# Patient Record
Sex: FEMALE | Race: OTHER | HISPANIC OR LATINO | Employment: FULL TIME | URBAN - METROPOLITAN AREA
[De-identification: names, ages, dates, MRNs, and addresses within clinical notes are randomized per-mention and may not be internally consistent; named-entity substitution may affect disease eponyms.]

---

## 2023-06-25 ENCOUNTER — OFFICE VISIT (OUTPATIENT)
Dept: URGENT CARE | Facility: PHYSICIAN GROUP | Age: 28
End: 2023-06-25
Payer: COMMERCIAL

## 2023-06-25 ENCOUNTER — HOSPITAL ENCOUNTER (OUTPATIENT)
Facility: MEDICAL CENTER | Age: 28
End: 2023-06-25
Attending: PHYSICIAN ASSISTANT
Payer: COMMERCIAL

## 2023-06-25 VITALS
SYSTOLIC BLOOD PRESSURE: 138 MMHG | WEIGHT: 293 LBS | TEMPERATURE: 97.3 F | OXYGEN SATURATION: 99 % | HEART RATE: 109 BPM | DIASTOLIC BLOOD PRESSURE: 64 MMHG | HEIGHT: 62 IN | BODY MASS INDEX: 53.92 KG/M2 | RESPIRATION RATE: 20 BRPM

## 2023-06-25 DIAGNOSIS — Z32.01 PREGNANCY CONFIRMED BY POSITIVE URINE TEST: ICD-10-CM

## 2023-06-25 LAB
APPEARANCE UR: CLEAR
BILIRUB UR STRIP-MCNC: NEGATIVE MG/DL
CANDIDA DNA VAG QL PROBE+SIG AMP: POSITIVE
COLOR UR AUTO: YELLOW
G VAGINALIS DNA VAG QL PROBE+SIG AMP: POSITIVE
GLUCOSE UR STRIP.AUTO-MCNC: NEGATIVE MG/DL
KETONES UR STRIP.AUTO-MCNC: NEGATIVE MG/DL
LEUKOCYTE ESTERASE UR QL STRIP.AUTO: NORMAL
NITRITE UR QL STRIP.AUTO: NEGATIVE
PH UR STRIP.AUTO: 7 [PH] (ref 5–8)
POCT INT CON NEG: NEGATIVE
POCT INT CON POS: POSITIVE
POCT URINE PREGNANCY TEST: POSITIVE
PROT UR QL STRIP: NEGATIVE MG/DL
RBC UR QL AUTO: NORMAL
SP GR UR STRIP.AUTO: 1.01
T VAGINALIS DNA VAG QL PROBE+SIG AMP: NEGATIVE
UROBILINOGEN UR STRIP-MCNC: 0.2 MG/DL

## 2023-06-25 PROCEDURE — 3075F SYST BP GE 130 - 139MM HG: CPT | Performed by: PHYSICIAN ASSISTANT

## 2023-06-25 PROCEDURE — 87491 CHLMYD TRACH DNA AMP PROBE: CPT

## 2023-06-25 PROCEDURE — 81002 URINALYSIS NONAUTO W/O SCOPE: CPT | Performed by: PHYSICIAN ASSISTANT

## 2023-06-25 PROCEDURE — 99203 OFFICE O/P NEW LOW 30 MIN: CPT | Performed by: PHYSICIAN ASSISTANT

## 2023-06-25 PROCEDURE — 87510 GARDNER VAG DNA DIR PROBE: CPT

## 2023-06-25 PROCEDURE — 3078F DIAST BP <80 MM HG: CPT | Performed by: PHYSICIAN ASSISTANT

## 2023-06-25 PROCEDURE — 87086 URINE CULTURE/COLONY COUNT: CPT

## 2023-06-25 PROCEDURE — 87480 CANDIDA DNA DIR PROBE: CPT

## 2023-06-25 PROCEDURE — 87591 N.GONORRHOEAE DNA AMP PROB: CPT

## 2023-06-25 PROCEDURE — 87660 TRICHOMONAS VAGIN DIR PROBE: CPT

## 2023-06-25 PROCEDURE — 81025 URINE PREGNANCY TEST: CPT | Performed by: PHYSICIAN ASSISTANT

## 2023-06-25 ASSESSMENT — ENCOUNTER SYMPTOMS
NAUSEA: 0
EYE PAIN: 0
DIARRHEA: 0
FEVER: 0
CONSTIPATION: 0
VOMITING: 0
SHORTNESS OF BREATH: 0
MYALGIAS: 0
COUGH: 0
CHILLS: 0
HEADACHES: 0
ABDOMINAL PAIN: 0
SORE THROAT: 0

## 2023-06-25 NOTE — PROGRESS NOTES
"Subjective:   Shelby Calderon is a 28 y.o. female who presents for UTI (Painful urination,possible vaginal infection,x1 day/Positive pregnancy test yesterday)      FIRST DAY OF LAST MENSTRUAL PERIOD around 3 months ago, has had some spotting last 2 months but not normal cycles.  Took a pregnancy test yesterday and was positive.  She is mostly noted some suprapubic pressure as well as pulling of the ligaments on bilateral hips when standing or coughing.  She has not noted any dysuria, frequency or urgency.  She denies any vaginal discharge.  She has not had any recent spotting or bleeding.        Review of Systems   Constitutional:  Negative for chills and fever.   HENT:  Negative for congestion, ear pain and sore throat.    Eyes:  Negative for pain.   Respiratory:  Negative for cough and shortness of breath.    Cardiovascular:  Negative for chest pain.   Gastrointestinal:  Negative for abdominal pain, constipation, diarrhea, nausea and vomiting.   Genitourinary:  Negative for dysuria.   Musculoskeletal:  Negative for myalgias.   Skin:  Negative for rash.   Neurological:  Negative for headaches.       Medications, Allergies, and current problem list reviewed today in Epic.     Objective:     /64 (BP Location: Right arm, Patient Position: Sitting, BP Cuff Size: Large adult)   Pulse (!) 109   Temp 36.3 °C (97.3 °F) (Temporal)   Resp 20   Ht 1.575 m (5' 2\")   Wt (!) 143 kg (316 lb)   SpO2 99%     Physical Exam  Vitals reviewed.   Constitutional:       Appearance: Normal appearance. She is obese.   HENT:      Head: Normocephalic and atraumatic.      Right Ear: External ear normal.      Left Ear: External ear normal.      Nose: Nose normal.      Mouth/Throat:      Mouth: Mucous membranes are moist.   Eyes:      Conjunctiva/sclera: Conjunctivae normal.   Cardiovascular:      Rate and Rhythm: Normal rate.   Pulmonary:      Effort: Pulmonary effort is normal.   Abdominal:      Tenderness: There is no abdominal " tenderness. There is no right CVA tenderness or left CVA tenderness.   Skin:     General: Skin is warm and dry.      Capillary Refill: Capillary refill takes less than 2 seconds.   Neurological:      Mental Status: She is alert and oriented to person, place, and time.         Assessment/Plan:     Diagnosis and associated orders:     1. Pregnancy confirmed by positive urine test  Chlamydia/GC, PCR (Genital/Anal swab)    POCT PREGNANCY    POCT Urinalysis    URINE CULTURE(NEW)    VAGINAL PATHOGENS DNA PANEL    Referral to OB/Gyn         Comments/MDM:     Urinalysis equivocal, pregnancy test in clinic positive.  Given her vague symptoms this could be a normal pregnancy symptoms versus evidence of a pathology.  Recommend we hold off on treatment until test results are available and clearly identify if there is any problem.  We will place a referral to OB/GYN, encouraged her to follow-up as if her LMP was around 3 months ago she likely requires some prenatal care.  Encouraged abstinence from controlled substances.  Reviewed pregnancy safe medications         Differential diagnosis, natural history, supportive care, and indications for immediate follow-up discussed.    Advised the patient to follow-up with the primary care physician for recheck, reevaluation, and consideration of further management.    Please note that this dictation was created using voice recognition software. I have made a reasonable attempt to correct obvious errors, but I expect that there are errors of grammar and possibly content that I did not discover before finalizing the note.    This note was electronically signed by Crescencio Velasquez PA-C

## 2023-06-26 LAB
C TRACH DNA GENITAL QL NAA+PROBE: NEGATIVE
N GONORRHOEA DNA GENITAL QL NAA+PROBE: NEGATIVE
SPECIMEN SOURCE: NORMAL

## 2023-06-27 DIAGNOSIS — B96.89 BV (BACTERIAL VAGINOSIS): ICD-10-CM

## 2023-06-27 DIAGNOSIS — N76.0 BV (BACTERIAL VAGINOSIS): ICD-10-CM

## 2023-06-27 DIAGNOSIS — B37.31 YEAST INFECTION OF THE VAGINA: ICD-10-CM

## 2023-06-27 LAB
BACTERIA UR CULT: NORMAL
SIGNIFICANT IND 70042: NORMAL
SITE SITE: NORMAL
SOURCE SOURCE: NORMAL

## 2023-06-27 RX ORDER — CLOTRIMAZOLE 1 %
1 CREAM WITH APPLICATOR VAGINAL DAILY
Qty: 7 EACH | Refills: 0 | Status: SHIPPED | OUTPATIENT
Start: 2023-06-27 | End: 2023-07-04

## 2023-06-27 RX ORDER — METRONIDAZOLE 500 MG/1
500 TABLET ORAL 2 TIMES DAILY
Qty: 14 TABLET | Refills: 0 | Status: SHIPPED | OUTPATIENT
Start: 2023-06-27 | End: 2023-07-04

## 2023-07-20 ENCOUNTER — HOSPITAL ENCOUNTER (OUTPATIENT)
Facility: MEDICAL CENTER | Age: 28
End: 2023-07-20
Attending: OBSTETRICS & GYNECOLOGY
Payer: COMMERCIAL

## 2023-07-20 LAB
ABO GROUP BLD: NORMAL
AMORPH CRY #/AREA URNS HPF: PRESENT /HPF
APPEARANCE UR: CLEAR
BACTERIA #/AREA URNS HPF: NEGATIVE /HPF
BASOPHILS # BLD AUTO: 0.3 % (ref 0–1.8)
BASOPHILS # BLD: 0.03 K/UL (ref 0–0.12)
BILIRUB UR QL STRIP.AUTO: NEGATIVE
BLD GP AB SCN SERPL QL: NORMAL
COLOR UR: YELLOW
EOSINOPHIL # BLD AUTO: 0.03 K/UL (ref 0–0.51)
EOSINOPHIL NFR BLD: 0.3 % (ref 0–6.9)
EPI CELLS #/AREA URNS HPF: NORMAL /HPF
ERYTHROCYTE [DISTWIDTH] IN BLOOD BY AUTOMATED COUNT: 47.2 FL (ref 35.9–50)
GLUCOSE UR STRIP.AUTO-MCNC: NEGATIVE MG/DL
HBV SURFACE AG SER QL: NORMAL
HCT VFR BLD AUTO: 39.6 % (ref 37–47)
HCV AB SER QL: NORMAL
HGB BLD-MCNC: 13 G/DL (ref 12–16)
HIV 1+2 AB+HIV1 P24 AG SERPL QL IA: NORMAL
HYALINE CASTS #/AREA URNS LPF: NORMAL /LPF
IMM GRANULOCYTES # BLD AUTO: 0.05 K/UL (ref 0–0.11)
IMM GRANULOCYTES NFR BLD AUTO: 0.4 % (ref 0–0.9)
KETONES UR STRIP.AUTO-MCNC: ABNORMAL MG/DL
LEUKOCYTE ESTERASE UR QL STRIP.AUTO: ABNORMAL
LYMPHOCYTES # BLD AUTO: 2.58 K/UL (ref 1–4.8)
LYMPHOCYTES NFR BLD: 23.1 % (ref 22–41)
MCH RBC QN AUTO: 28.4 PG (ref 27–33)
MCHC RBC AUTO-ENTMCNC: 32.8 G/DL (ref 32.2–35.5)
MCV RBC AUTO: 86.5 FL (ref 81.4–97.8)
MICRO URNS: ABNORMAL
MONOCYTES # BLD AUTO: 0.67 K/UL (ref 0–0.85)
MONOCYTES NFR BLD AUTO: 6 % (ref 0–13.4)
NEUTROPHILS # BLD AUTO: 7.81 K/UL (ref 1.82–7.42)
NEUTROPHILS NFR BLD: 69.9 % (ref 44–72)
NITRITE UR QL STRIP.AUTO: NEGATIVE
NRBC # BLD AUTO: 0 K/UL
NRBC BLD-RTO: 0 /100 WBC (ref 0–0.2)
PH UR STRIP.AUTO: 6.5 [PH] (ref 5–8)
PLATELET # BLD AUTO: 360 K/UL (ref 164–446)
PMV BLD AUTO: 10.5 FL (ref 9–12.9)
PROT UR QL STRIP: NEGATIVE MG/DL
RBC # BLD AUTO: 4.58 M/UL (ref 4.2–5.4)
RBC # URNS HPF: NORMAL /HPF
RBC UR QL AUTO: NEGATIVE
RH BLD: NORMAL
RUBV AB SER QL: 246 IU/ML
SP GR UR STRIP.AUTO: 1.03
T PALLIDUM AB SER QL IA: NORMAL
UROBILINOGEN UR STRIP.AUTO-MCNC: 1 MG/DL
WBC # BLD AUTO: 11.2 K/UL (ref 4.8–10.8)
WBC #/AREA URNS HPF: NORMAL /HPF

## 2023-07-20 PROCEDURE — 86850 RBC ANTIBODY SCREEN: CPT

## 2023-07-20 PROCEDURE — 87491 CHLMYD TRACH DNA AMP PROBE: CPT

## 2023-07-20 PROCEDURE — 87389 HIV-1 AG W/HIV-1&-2 AB AG IA: CPT

## 2023-07-20 PROCEDURE — 87086 URINE CULTURE/COLONY COUNT: CPT

## 2023-07-20 PROCEDURE — 85025 COMPLETE CBC W/AUTO DIFF WBC: CPT

## 2023-07-20 PROCEDURE — 86900 BLOOD TYPING SEROLOGIC ABO: CPT

## 2023-07-20 PROCEDURE — 86803 HEPATITIS C AB TEST: CPT

## 2023-07-20 PROCEDURE — 86901 BLOOD TYPING SEROLOGIC RH(D): CPT

## 2023-07-20 PROCEDURE — 87340 HEPATITIS B SURFACE AG IA: CPT

## 2023-07-20 PROCEDURE — 86762 RUBELLA ANTIBODY: CPT

## 2023-07-20 PROCEDURE — 81001 URINALYSIS AUTO W/SCOPE: CPT

## 2023-07-20 PROCEDURE — 87591 N.GONORRHOEAE DNA AMP PROB: CPT

## 2023-07-20 PROCEDURE — 86780 TREPONEMA PALLIDUM: CPT

## 2023-07-21 LAB
C TRACH DNA SPEC QL NAA+PROBE: NEGATIVE
N GONORRHOEA DNA SPEC QL NAA+PROBE: NEGATIVE
SPECIMEN SOURCE: NORMAL

## 2023-07-22 LAB
BACTERIA UR CULT: NORMAL
SIGNIFICANT IND 70042: NORMAL
SITE SITE: NORMAL
SOURCE SOURCE: NORMAL

## 2023-08-16 ENCOUNTER — OFFICE VISIT (OUTPATIENT)
Dept: OBGYN | Facility: CLINIC | Age: 28
End: 2023-08-16
Payer: COMMERCIAL

## 2023-08-16 VITALS — DIASTOLIC BLOOD PRESSURE: 76 MMHG | WEIGHT: 293 LBS | SYSTOLIC BLOOD PRESSURE: 128 MMHG | BODY MASS INDEX: 57.16 KG/M2

## 2023-08-16 DIAGNOSIS — O35.EXX0 FETAL RENAL ANOMALY, SINGLE GESTATION: ICD-10-CM

## 2023-08-16 DIAGNOSIS — O43.192 MARGINAL INSERTION OF UMBILICAL CORD AFFECTING MANAGEMENT OF MOTHER IN SECOND TRIMESTER: ICD-10-CM

## 2023-08-16 DIAGNOSIS — E66.01 MORBID OBESITY (HCC): ICD-10-CM

## 2023-08-16 DIAGNOSIS — Z3A.19 19 WEEKS GESTATION OF PREGNANCY: ICD-10-CM

## 2023-08-16 PROCEDURE — 3078F DIAST BP <80 MM HG: CPT | Performed by: OBSTETRICS & GYNECOLOGY

## 2023-08-16 PROCEDURE — 99202 OFFICE O/P NEW SF 15 MIN: CPT | Performed by: OBSTETRICS & GYNECOLOGY

## 2023-08-16 PROCEDURE — 3074F SYST BP LT 130 MM HG: CPT | Performed by: OBSTETRICS & GYNECOLOGY

## 2023-08-16 PROCEDURE — 76817 TRANSVAGINAL US OBSTETRIC: CPT | Performed by: OBSTETRICS & GYNECOLOGY

## 2023-08-16 PROCEDURE — 76811 OB US DETAILED SNGL FETUS: CPT | Performed by: OBSTETRICS & GYNECOLOGY

## 2023-08-17 NOTE — PROGRESS NOTES
"This is a 28 yrs year old  Ab0 referred for routine ultrasound but with incidental findings of unilateral pyelectasis and marginal cord insertion.   She had NIPT which was low risk for common aneuploidies, monosomy X and triploidy.   Consistent with a male.  Image quality was poor.      DISCUSSION:   MARGINAL CORD INSERTION  A marginal cord insertion was noted.   This is not uncommon but has an association with increase risk of adverse fetal outcomes with IUGR being the most common.  The role of serial ultrasounds to monitor the fetal growth was discussed.    PYELECTASIS:  She was informed of the finding, pyelectasis which is seen in 3% of ultrasounds and affects male more commonly the females 4:1.  The most common etiology is reflux.  In over 90% of cases, this will resolve by the  period.  We discussed the role of serial ultrasound to monitor this condition.       Pyelectasis is a \"soft sign\" for T21 which means pyelectasis can be seen in fetuses who are normal but also in those with T21. It is not diagnostic for T21.  Since her cfDNA was low risk and the NPV for cfDNA is extremely high, the likelihood of T21 is extremely low.  She was satisfied with this explanation.     New patient with expanded problem focused evaluation with straight forward medical decision making.  "

## 2023-09-13 ENCOUNTER — OFFICE VISIT (OUTPATIENT)
Dept: OBGYN | Facility: CLINIC | Age: 28
End: 2023-09-13
Payer: COMMERCIAL

## 2023-09-13 VITALS — DIASTOLIC BLOOD PRESSURE: 76 MMHG | BODY MASS INDEX: 56.66 KG/M2 | WEIGHT: 293 LBS | SYSTOLIC BLOOD PRESSURE: 119 MMHG

## 2023-09-13 DIAGNOSIS — Z3A.23 23 WEEKS GESTATION OF PREGNANCY: ICD-10-CM

## 2023-09-13 DIAGNOSIS — E66.01 MORBID OBESITY (HCC): ICD-10-CM

## 2023-09-13 PROCEDURE — 3074F SYST BP LT 130 MM HG: CPT | Performed by: OBSTETRICS & GYNECOLOGY

## 2023-09-13 PROCEDURE — 3078F DIAST BP <80 MM HG: CPT | Performed by: OBSTETRICS & GYNECOLOGY

## 2023-09-13 PROCEDURE — 76816 OB US FOLLOW-UP PER FETUS: CPT | Performed by: OBSTETRICS & GYNECOLOGY

## 2023-10-19 ENCOUNTER — ANCILLARY PROCEDURE (OUTPATIENT)
Dept: MATERNAL FETAL MEDICINE | Facility: MEDICAL CENTER | Age: 28
End: 2023-10-19
Payer: COMMERCIAL

## 2023-10-19 VITALS — BODY MASS INDEX: 57.82 KG/M2 | WEIGHT: 293 LBS

## 2023-10-19 DIAGNOSIS — E66.01 MORBID OBESITY (HCC): ICD-10-CM

## 2023-10-19 PROCEDURE — 76816 OB US FOLLOW-UP PER FETUS: CPT | Performed by: OBSTETRICS & GYNECOLOGY

## 2023-12-09 LAB — GP B STREP DNA SPEC QL NAA+PROBE: NORMAL

## 2024-01-03 ENCOUNTER — HOSPITAL ENCOUNTER (INPATIENT)
Facility: MEDICAL CENTER | Age: 29
LOS: 4 days | End: 2024-01-07
Attending: OBSTETRICS & GYNECOLOGY | Admitting: OBSTETRICS & GYNECOLOGY
Payer: COMMERCIAL

## 2024-01-03 ENCOUNTER — APPOINTMENT (OUTPATIENT)
Dept: RADIOLOGY | Facility: MEDICAL CENTER | Age: 29
End: 2024-01-03
Attending: OBSTETRICS & GYNECOLOGY
Payer: COMMERCIAL

## 2024-01-03 DIAGNOSIS — G89.18 POSTOPERATIVE PAIN: ICD-10-CM

## 2024-01-03 LAB
APPEARANCE UR: CLEAR
APPEARANCE UR: CLEAR
BILIRUB UR QL STRIP.AUTO: NEGATIVE
COLOR UR AUTO: ABNORMAL
COLOR UR: YELLOW
GLUCOSE UR QL STRIP.AUTO: NEGATIVE MG/DL
GLUCOSE UR STRIP.AUTO-MCNC: NEGATIVE MG/DL
KETONES UR QL STRIP.AUTO: NEGATIVE MG/DL
KETONES UR STRIP.AUTO-MCNC: NEGATIVE MG/DL
LEUKOCYTE ESTERASE UR QL STRIP.AUTO: NEGATIVE
LEUKOCYTE ESTERASE UR QL STRIP.AUTO: NEGATIVE
MICRO URNS: NORMAL
NITRITE UR QL STRIP.AUTO: NEGATIVE
NITRITE UR QL STRIP.AUTO: NEGATIVE
PH UR STRIP.AUTO: 6 [PH] (ref 5–8)
PH UR STRIP.AUTO: 6 [PH] (ref 5–8)
PROT UR QL STRIP: 30 MG/DL
PROT UR QL STRIP: NEGATIVE MG/DL
RBC UR QL AUTO: NEGATIVE
RBC UR QL AUTO: NEGATIVE
SP GR UR STRIP.AUTO: 1.03
SP GR UR STRIP.AUTO: >=1.03 (ref 1–1.03)
UROBILINOGEN UR STRIP.AUTO-MCNC: 0.2 MG/DL

## 2024-01-03 PROCEDURE — 84156 ASSAY OF PROTEIN URINE: CPT

## 2024-01-03 PROCEDURE — 89060 EXAM SYNOVIAL FLUID CRYSTALS: CPT

## 2024-01-03 PROCEDURE — 770002 HCHG ROOM/CARE - OB PRIVATE (112)

## 2024-01-03 PROCEDURE — 302449 STATCHG TRIAGE ONLY (STATISTIC)

## 2024-01-03 PROCEDURE — 81002 URINALYSIS NONAUTO W/O SCOPE: CPT

## 2024-01-03 PROCEDURE — 81003 URINALYSIS AUTO W/O SCOPE: CPT

## 2024-01-03 PROCEDURE — A9270 NON-COVERED ITEM OR SERVICE: HCPCS | Performed by: OBSTETRICS & GYNECOLOGY

## 2024-01-03 PROCEDURE — 700102 HCHG RX REV CODE 250 W/ 637 OVERRIDE(OP): Performed by: OBSTETRICS & GYNECOLOGY

## 2024-01-03 PROCEDURE — 82570 ASSAY OF URINE CREATININE: CPT

## 2024-01-03 RX ORDER — NIFEDIPINE 10 MG/1
10 CAPSULE ORAL
Status: DISCONTINUED | OUTPATIENT
Start: 2024-01-03 | End: 2024-01-05 | Stop reason: HOSPADM

## 2024-01-03 RX ORDER — LABETALOL HYDROCHLORIDE 5 MG/ML
20-80 INJECTION, SOLUTION INTRAVENOUS
Status: DISCONTINUED | OUTPATIENT
Start: 2024-01-03 | End: 2024-01-05 | Stop reason: HOSPADM

## 2024-01-03 RX ORDER — HYDRALAZINE HYDROCHLORIDE 20 MG/ML
5-10 INJECTION INTRAMUSCULAR; INTRAVENOUS
Status: DISCONTINUED | OUTPATIENT
Start: 2024-01-03 | End: 2024-01-05 | Stop reason: HOSPADM

## 2024-01-03 RX ORDER — NIFEDIPINE 10 MG/1
10 CAPSULE ORAL ONCE
Status: COMPLETED | OUTPATIENT
Start: 2024-01-04 | End: 2024-01-03

## 2024-01-03 RX ADMIN — NIFEDIPINE 10 MG: 10 CAPSULE ORAL at 23:43

## 2024-01-04 ENCOUNTER — ANESTHESIA EVENT (OUTPATIENT)
Dept: OBGYN | Facility: MEDICAL CENTER | Age: 29
End: 2024-01-04
Payer: COMMERCIAL

## 2024-01-04 ENCOUNTER — ANESTHESIA (OUTPATIENT)
Dept: OBGYN | Facility: MEDICAL CENTER | Age: 29
End: 2024-01-04
Payer: COMMERCIAL

## 2024-01-04 LAB
ALBUMIN SERPL BCP-MCNC: 3.1 G/DL (ref 3.2–4.9)
ALBUMIN SERPL BCP-MCNC: 3.4 G/DL (ref 3.2–4.9)
ALBUMIN/GLOB SERPL: 0.9 G/DL
ALBUMIN/GLOB SERPL: 1 G/DL
ALP SERPL-CCNC: 142 U/L (ref 30–99)
ALP SERPL-CCNC: 147 U/L (ref 30–99)
ALT SERPL-CCNC: 26 U/L (ref 2–50)
ALT SERPL-CCNC: 36 U/L (ref 2–50)
ANION GAP SERPL CALC-SCNC: 13 MMOL/L (ref 7–16)
ANION GAP SERPL CALC-SCNC: 13 MMOL/L (ref 7–16)
AST SERPL-CCNC: 23 U/L (ref 12–45)
AST SERPL-CCNC: 35 U/L (ref 12–45)
BASOPHILS # BLD AUTO: 0.1 % (ref 0–1.8)
BASOPHILS # BLD AUTO: 0.3 % (ref 0–1.8)
BASOPHILS # BLD: 0.02 K/UL (ref 0–0.12)
BASOPHILS # BLD: 0.03 K/UL (ref 0–0.12)
BILIRUB SERPL-MCNC: 0.2 MG/DL (ref 0.1–1.5)
BILIRUB SERPL-MCNC: 0.4 MG/DL (ref 0.1–1.5)
BUN SERPL-MCNC: 13 MG/DL (ref 8–22)
BUN SERPL-MCNC: 8 MG/DL (ref 8–22)
CALCIUM ALBUM COR SERPL-MCNC: 9.4 MG/DL (ref 8.5–10.5)
CALCIUM ALBUM COR SERPL-MCNC: 9.7 MG/DL (ref 8.5–10.5)
CALCIUM SERPL-MCNC: 8.7 MG/DL (ref 8.5–10.5)
CALCIUM SERPL-MCNC: 9.2 MG/DL (ref 8.5–10.5)
CHLORIDE SERPL-SCNC: 102 MMOL/L (ref 96–112)
CHLORIDE SERPL-SCNC: 105 MMOL/L (ref 96–112)
CO2 SERPL-SCNC: 20 MMOL/L (ref 20–33)
CO2 SERPL-SCNC: 20 MMOL/L (ref 20–33)
CREAT SERPL-MCNC: 0.4 MG/DL (ref 0.5–1.4)
CREAT SERPL-MCNC: 0.45 MG/DL (ref 0.5–1.4)
CREAT UR-MCNC: 110.17 MG/DL
CREAT UR-MCNC: 163.03 MG/DL
CRYSTALS AMN MICRO: NORMAL
EOSINOPHIL # BLD AUTO: 0.01 K/UL (ref 0–0.51)
EOSINOPHIL # BLD AUTO: 0.03 K/UL (ref 0–0.51)
EOSINOPHIL NFR BLD: 0.1 % (ref 0–6.9)
EOSINOPHIL NFR BLD: 0.3 % (ref 0–6.9)
ERYTHROCYTE [DISTWIDTH] IN BLOOD BY AUTOMATED COUNT: 46 FL (ref 35.9–50)
ERYTHROCYTE [DISTWIDTH] IN BLOOD BY AUTOMATED COUNT: 46.3 FL (ref 35.9–50)
GFR SERPLBLD CREATININE-BSD FMLA CKD-EPI: 132 ML/MIN/1.73 M 2
GFR SERPLBLD CREATININE-BSD FMLA CKD-EPI: 136 ML/MIN/1.73 M 2
GLOBULIN SER CALC-MCNC: 3.4 G/DL (ref 1.9–3.5)
GLOBULIN SER CALC-MCNC: 3.5 G/DL (ref 1.9–3.5)
GLUCOSE SERPL-MCNC: 107 MG/DL (ref 65–99)
GLUCOSE SERPL-MCNC: 93 MG/DL (ref 65–99)
HCT VFR BLD AUTO: 34.3 % (ref 37–47)
HCT VFR BLD AUTO: 35.7 % (ref 37–47)
HGB BLD-MCNC: 11.4 G/DL (ref 12–16)
HGB BLD-MCNC: 11.5 G/DL (ref 12–16)
HOLDING TUBE BB 8507: NORMAL
IMM GRANULOCYTES # BLD AUTO: 0.09 K/UL (ref 0–0.11)
IMM GRANULOCYTES # BLD AUTO: 0.12 K/UL (ref 0–0.11)
IMM GRANULOCYTES NFR BLD AUTO: 0.8 % (ref 0–0.9)
IMM GRANULOCYTES NFR BLD AUTO: 0.8 % (ref 0–0.9)
LYMPHOCYTES # BLD AUTO: 1.26 K/UL (ref 1–4.8)
LYMPHOCYTES # BLD AUTO: 2.6 K/UL (ref 1–4.8)
LYMPHOCYTES NFR BLD: 21.8 % (ref 22–41)
LYMPHOCYTES NFR BLD: 8.8 % (ref 22–41)
MCH RBC QN AUTO: 26.3 PG (ref 27–33)
MCH RBC QN AUTO: 27 PG (ref 27–33)
MCHC RBC AUTO-ENTMCNC: 32.2 G/DL (ref 32.2–35.5)
MCHC RBC AUTO-ENTMCNC: 33.2 G/DL (ref 32.2–35.5)
MCV RBC AUTO: 81.1 FL (ref 81.4–97.8)
MCV RBC AUTO: 81.5 FL (ref 81.4–97.8)
MONOCYTES # BLD AUTO: 0.6 K/UL (ref 0–0.85)
MONOCYTES # BLD AUTO: 0.68 K/UL (ref 0–0.85)
MONOCYTES NFR BLD AUTO: 4.2 % (ref 0–13.4)
MONOCYTES NFR BLD AUTO: 5.7 % (ref 0–13.4)
NEUTROPHILS # BLD AUTO: 12.24 K/UL (ref 1.82–7.42)
NEUTROPHILS # BLD AUTO: 8.49 K/UL (ref 1.82–7.42)
NEUTROPHILS NFR BLD: 71.1 % (ref 44–72)
NEUTROPHILS NFR BLD: 86 % (ref 44–72)
NRBC # BLD AUTO: 0 K/UL
NRBC # BLD AUTO: 0 K/UL
NRBC BLD-RTO: 0 /100 WBC (ref 0–0.2)
NRBC BLD-RTO: 0 /100 WBC (ref 0–0.2)
PLATELET # BLD AUTO: 385 K/UL (ref 164–446)
PLATELET # BLD AUTO: 405 K/UL (ref 164–446)
PMV BLD AUTO: 10 FL (ref 9–12.9)
PMV BLD AUTO: 10.2 FL (ref 9–12.9)
POTASSIUM SERPL-SCNC: 3.5 MMOL/L (ref 3.6–5.5)
POTASSIUM SERPL-SCNC: 3.9 MMOL/L (ref 3.6–5.5)
PROT SERPL-MCNC: 6.6 G/DL (ref 6–8.2)
PROT SERPL-MCNC: 6.8 G/DL (ref 6–8.2)
PROT UR-MCNC: 25 MG/DL (ref 0–15)
PROT UR-MCNC: 58 MG/DL (ref 0–15)
PROT/CREAT UR: 227 MG/G (ref 10–107)
PROT/CREAT UR: 356 MG/G (ref 10–107)
RBC # BLD AUTO: 4.23 M/UL (ref 4.2–5.4)
RBC # BLD AUTO: 4.38 M/UL (ref 4.2–5.4)
SODIUM SERPL-SCNC: 135 MMOL/L (ref 135–145)
SODIUM SERPL-SCNC: 138 MMOL/L (ref 135–145)
T PALLIDUM AB SER QL IA: NORMAL
URATE SERPL-MCNC: 4.1 MG/DL (ref 1.9–8.2)
WBC # BLD AUTO: 11.9 K/UL (ref 4.8–10.8)
WBC # BLD AUTO: 14.3 K/UL (ref 4.8–10.8)

## 2024-01-04 PROCEDURE — 80053 COMPREHEN METABOLIC PANEL: CPT

## 2024-01-04 PROCEDURE — 700105 HCHG RX REV CODE 258: Performed by: ANESTHESIOLOGY

## 2024-01-04 PROCEDURE — 770002 HCHG ROOM/CARE - OB PRIVATE (112)

## 2024-01-04 PROCEDURE — 86780 TREPONEMA PALLIDUM: CPT

## 2024-01-04 PROCEDURE — 36415 COLL VENOUS BLD VENIPUNCTURE: CPT

## 2024-01-04 PROCEDURE — 700111 HCHG RX REV CODE 636 W/ 250 OVERRIDE (IP): Mod: JZ | Performed by: ANESTHESIOLOGY

## 2024-01-04 PROCEDURE — 700105 HCHG RX REV CODE 258: Performed by: OBSTETRICS & GYNECOLOGY

## 2024-01-04 PROCEDURE — 84550 ASSAY OF BLOOD/URIC ACID: CPT

## 2024-01-04 PROCEDURE — A9270 NON-COVERED ITEM OR SERVICE: HCPCS | Performed by: OBSTETRICS & GYNECOLOGY

## 2024-01-04 PROCEDURE — 303615 HCHG EPIDURAL/SPINAL ANESTHESIA FOR LABOR

## 2024-01-04 PROCEDURE — 700111 HCHG RX REV CODE 636 W/ 250 OVERRIDE (IP)

## 2024-01-04 PROCEDURE — 700111 HCHG RX REV CODE 636 W/ 250 OVERRIDE (IP): Mod: JZ | Performed by: OBSTETRICS & GYNECOLOGY

## 2024-01-04 PROCEDURE — 700101 HCHG RX REV CODE 250: Performed by: OBSTETRICS & GYNECOLOGY

## 2024-01-04 PROCEDURE — 700111 HCHG RX REV CODE 636 W/ 250 OVERRIDE (IP): Performed by: ANESTHESIOLOGY

## 2024-01-04 PROCEDURE — 84156 ASSAY OF PROTEIN URINE: CPT

## 2024-01-04 PROCEDURE — 85025 COMPLETE CBC W/AUTO DIFF WBC: CPT

## 2024-01-04 PROCEDURE — 500726 HCHG BAKRI TAPENADE BALLOON

## 2024-01-04 PROCEDURE — 82570 ASSAY OF URINE CREATININE: CPT

## 2024-01-04 PROCEDURE — 76815 OB US LIMITED FETUS(S): CPT

## 2024-01-04 PROCEDURE — 700102 HCHG RX REV CODE 250 W/ 637 OVERRIDE(OP): Performed by: OBSTETRICS & GYNECOLOGY

## 2024-01-04 RX ORDER — ONDANSETRON 2 MG/ML
4 INJECTION INTRAMUSCULAR; INTRAVENOUS EVERY 6 HOURS PRN
Status: DISCONTINUED | OUTPATIENT
Start: 2024-01-04 | End: 2024-01-05 | Stop reason: HOSPADM

## 2024-01-04 RX ORDER — SODIUM CHLORIDE, SODIUM LACTATE, POTASSIUM CHLORIDE, AND CALCIUM CHLORIDE .6; .31; .03; .02 G/100ML; G/100ML; G/100ML; G/100ML
250 INJECTION, SOLUTION INTRAVENOUS PRN
Status: DISCONTINUED | OUTPATIENT
Start: 2024-01-04 | End: 2024-01-05 | Stop reason: HOSPADM

## 2024-01-04 RX ORDER — TERBUTALINE SULFATE 1 MG/ML
0.25 INJECTION, SOLUTION SUBCUTANEOUS
Status: DISCONTINUED | OUTPATIENT
Start: 2024-01-04 | End: 2024-01-05 | Stop reason: HOSPADM

## 2024-01-04 RX ORDER — SODIUM CHLORIDE, SODIUM LACTATE, POTASSIUM CHLORIDE, AND CALCIUM CHLORIDE .6; .31; .03; .02 G/100ML; G/100ML; G/100ML; G/100ML
1000 INJECTION, SOLUTION INTRAVENOUS
Status: COMPLETED | OUTPATIENT
Start: 2024-01-04 | End: 2024-01-04

## 2024-01-04 RX ORDER — METOCLOPRAMIDE HYDROCHLORIDE 5 MG/ML
10 INJECTION INTRAMUSCULAR; INTRAVENOUS ONCE
Status: COMPLETED | OUTPATIENT
Start: 2024-01-05 | End: 2024-01-04

## 2024-01-04 RX ORDER — ROPIVACAINE HYDROCHLORIDE 2 MG/ML
INJECTION, SOLUTION EPIDURAL; INFILTRATION; PERINEURAL CONTINUOUS
Status: DISCONTINUED | OUTPATIENT
Start: 2024-01-04 | End: 2024-01-05 | Stop reason: HOSPADM

## 2024-01-04 RX ORDER — BUPIVACAINE HYDROCHLORIDE 2.5 MG/ML
INJECTION, SOLUTION EPIDURAL; INFILTRATION; INTRACAUDAL PRN
Status: DISCONTINUED | OUTPATIENT
Start: 2024-01-04 | End: 2024-01-05 | Stop reason: SURG

## 2024-01-04 RX ORDER — MAGNESIUM SULFATE HEPTAHYDRATE 40 MG/ML
1 INJECTION, SOLUTION INTRAVENOUS CONTINUOUS
Status: DISCONTINUED | OUTPATIENT
Start: 2024-01-04 | End: 2024-01-05 | Stop reason: ALTCHOICE

## 2024-01-04 RX ORDER — ACETAMINOPHEN 500 MG
1000 TABLET ORAL
Status: COMPLETED | OUTPATIENT
Start: 2024-01-04 | End: 2024-01-05

## 2024-01-04 RX ORDER — OXYTOCIN 10 [USP'U]/ML
10 INJECTION, SOLUTION INTRAMUSCULAR; INTRAVENOUS
Status: DISCONTINUED | OUTPATIENT
Start: 2024-01-04 | End: 2024-01-05 | Stop reason: HOSPADM

## 2024-01-04 RX ORDER — MAGNESIUM SULFATE HEPTAHYDRATE 40 MG/ML
INJECTION, SOLUTION INTRAVENOUS
Status: COMPLETED
Start: 2024-01-04 | End: 2024-01-05

## 2024-01-04 RX ORDER — EPHEDRINE SULFATE 50 MG/ML
5 INJECTION, SOLUTION INTRAVENOUS
Status: DISCONTINUED | OUTPATIENT
Start: 2024-01-04 | End: 2024-01-05 | Stop reason: HOSPADM

## 2024-01-04 RX ORDER — BUPIVACAINE HYDROCHLORIDE 2.5 MG/ML
INJECTION, SOLUTION EPIDURAL; INFILTRATION; INTRACAUDAL
Status: COMPLETED
Start: 2024-01-04 | End: 2024-01-04

## 2024-01-04 RX ORDER — SODIUM CHLORIDE, SODIUM LACTATE, POTASSIUM CHLORIDE, CALCIUM CHLORIDE 600; 310; 30; 20 MG/100ML; MG/100ML; MG/100ML; MG/100ML
INJECTION, SOLUTION INTRAVENOUS CONTINUOUS
Status: DISCONTINUED | OUTPATIENT
Start: 2024-01-04 | End: 2024-01-04

## 2024-01-04 RX ORDER — CITRIC ACID/SODIUM CITRATE 334-500MG
30 SOLUTION, ORAL ORAL ONCE
Status: COMPLETED | OUTPATIENT
Start: 2024-01-05 | End: 2024-01-04

## 2024-01-04 RX ORDER — SODIUM CHLORIDE, SODIUM GLUCONATE, SODIUM ACETATE, POTASSIUM CHLORIDE AND MAGNESIUM CHLORIDE 526; 502; 368; 37; 30 MG/100ML; MG/100ML; MG/100ML; MG/100ML; MG/100ML
1000 INJECTION, SOLUTION INTRAVENOUS ONCE
Status: COMPLETED | OUTPATIENT
Start: 2024-01-05 | End: 2024-01-05

## 2024-01-04 RX ORDER — IBUPROFEN 800 MG/1
800 TABLET ORAL
Status: DISCONTINUED | OUTPATIENT
Start: 2024-01-04 | End: 2024-01-05 | Stop reason: HOSPADM

## 2024-01-04 RX ORDER — LIDOCAINE HYDROCHLORIDE 10 MG/ML
20 INJECTION, SOLUTION INFILTRATION; PERINEURAL
Status: DISCONTINUED | OUTPATIENT
Start: 2024-01-04 | End: 2024-01-05 | Stop reason: HOSPADM

## 2024-01-04 RX ORDER — AZITHROMYCIN 500 MG/5ML
500 INJECTION, POWDER, LYOPHILIZED, FOR SOLUTION INTRAVENOUS ONCE
Status: COMPLETED | OUTPATIENT
Start: 2024-01-05 | End: 2024-01-05

## 2024-01-04 RX ORDER — MAGNESIUM SULFATE HEPTAHYDRATE 40 MG/ML
INJECTION, SOLUTION INTRAVENOUS
Status: COMPLETED
Start: 2024-01-04 | End: 2024-01-04

## 2024-01-04 RX ORDER — DEXTROSE, SODIUM CHLORIDE, SODIUM LACTATE, POTASSIUM CHLORIDE, AND CALCIUM CHLORIDE 5; .6; .31; .03; .02 G/100ML; G/100ML; G/100ML; G/100ML; G/100ML
INJECTION, SOLUTION INTRAVENOUS CONTINUOUS
Status: DISCONTINUED | OUTPATIENT
Start: 2024-01-04 | End: 2024-01-05

## 2024-01-04 RX ORDER — ASPIRIN 81 MG/1
81 TABLET, CHEWABLE ORAL DAILY
Status: ON HOLD | COMMUNITY
End: 2024-01-07

## 2024-01-04 RX ORDER — MAGNESIUM SULFATE HEPTAHYDRATE 40 MG/ML
4 INJECTION, SOLUTION INTRAVENOUS ONCE
Status: COMPLETED | OUTPATIENT
Start: 2024-01-04 | End: 2024-01-04

## 2024-01-04 RX ORDER — CALCIUM GLUCONATE 94 MG/ML
1 INJECTION, SOLUTION INTRAVENOUS
Status: DISCONTINUED | OUTPATIENT
Start: 2024-01-04 | End: 2024-01-05 | Stop reason: HOSPADM

## 2024-01-04 RX ORDER — ONDANSETRON 4 MG/1
4 TABLET, ORALLY DISINTEGRATING ORAL EVERY 6 HOURS PRN
Status: DISCONTINUED | OUTPATIENT
Start: 2024-01-04 | End: 2024-01-05 | Stop reason: HOSPADM

## 2024-01-04 RX ORDER — SODIUM CHLORIDE, SODIUM LACTATE, POTASSIUM CHLORIDE, CALCIUM CHLORIDE 600; 310; 30; 20 MG/100ML; MG/100ML; MG/100ML; MG/100ML
INJECTION, SOLUTION INTRAVENOUS CONTINUOUS
Status: DISCONTINUED | OUTPATIENT
Start: 2024-01-04 | End: 2024-01-05 | Stop reason: HOSPADM

## 2024-01-04 RX ADMIN — LABETALOL HYDROCHLORIDE 20 MG: 5 INJECTION, SOLUTION INTRAVENOUS at 21:10

## 2024-01-04 RX ADMIN — SODIUM CHLORIDE, POTASSIUM CHLORIDE, SODIUM LACTATE AND CALCIUM CHLORIDE: 600; 310; 30; 20 INJECTION, SOLUTION INTRAVENOUS at 18:33

## 2024-01-04 RX ADMIN — SODIUM CHLORIDE, POTASSIUM CHLORIDE, SODIUM LACTATE AND CALCIUM CHLORIDE 1000 ML: 600; 310; 30; 20 INJECTION, SOLUTION INTRAVENOUS at 16:47

## 2024-01-04 RX ADMIN — HYDRALAZINE HYDROCHLORIDE 5 MG: 20 INJECTION, SOLUTION INTRAMUSCULAR; INTRAVENOUS at 00:48

## 2024-01-04 RX ADMIN — OXYTOCIN 2 MILLI-UNITS/MIN: 10 INJECTION, SOLUTION INTRAMUSCULAR; INTRAVENOUS at 02:21

## 2024-01-04 RX ADMIN — SODIUM CHLORIDE, POTASSIUM CHLORIDE, SODIUM LACTATE AND CALCIUM CHLORIDE: 600; 310; 30; 20 INJECTION, SOLUTION INTRAVENOUS at 02:19

## 2024-01-04 RX ADMIN — SODIUM CHLORIDE, SODIUM LACTATE, POTASSIUM CHLORIDE, CALCIUM CHLORIDE AND DEXTROSE MONOHYDRATE: 5; 600; 310; 30; 20 INJECTION, SOLUTION INTRAVENOUS at 21:13

## 2024-01-04 RX ADMIN — ONDANSETRON 4 MG: 2 INJECTION INTRAMUSCULAR; INTRAVENOUS at 19:50

## 2024-01-04 RX ADMIN — OXYTOCIN 2 MILLI-UNITS/MIN: 10 INJECTION, SOLUTION INTRAMUSCULAR; INTRAVENOUS at 18:38

## 2024-01-04 RX ADMIN — ROPIVACAINE HYDROCHLORIDE: 2 INJECTION, SOLUTION EPIDURAL; INFILTRATION at 17:14

## 2024-01-04 RX ADMIN — HYDRALAZINE HYDROCHLORIDE 5 MG: 20 INJECTION, SOLUTION INTRAMUSCULAR; INTRAVENOUS at 18:46

## 2024-01-04 RX ADMIN — METOCLOPRAMIDE 10 MG: 5 INJECTION, SOLUTION INTRAMUSCULAR; INTRAVENOUS at 23:58

## 2024-01-04 RX ADMIN — FENTANYL CITRATE 50 MCG: 50 INJECTION, SOLUTION INTRAMUSCULAR; INTRAVENOUS at 17:08

## 2024-01-04 RX ADMIN — NIFEDIPINE 10 MG: 10 CAPSULE ORAL at 19:56

## 2024-01-04 RX ADMIN — AZITHROMYCIN FOR INJECTION INJECTION, POWDER, LYOPHILIZED, FOR SOLUTION 500 MG: 500 INJECTION INTRAVENOUS at 23:59

## 2024-01-04 RX ADMIN — SODIUM CITRATE AND CITRIC ACID MONOHYDRATE 30 ML: 334; 500 SOLUTION ORAL at 23:58

## 2024-01-04 RX ADMIN — MAGNESIUM SULFATE IN WATER 40 G: 40 INJECTION, SOLUTION INTRAVENOUS at 22:10

## 2024-01-04 RX ADMIN — SODIUM CHLORIDE, POTASSIUM CHLORIDE, SODIUM LACTATE AND CALCIUM CHLORIDE: 600; 310; 30; 20 INJECTION, SOLUTION INTRAVENOUS at 10:25

## 2024-01-04 RX ADMIN — FAMOTIDINE 20 MG: 10 INJECTION, SOLUTION INTRAVENOUS at 23:58

## 2024-01-04 RX ADMIN — FENTANYL CITRATE 50 MCG: 50 INJECTION, SOLUTION INTRAMUSCULAR; INTRAVENOUS at 17:03

## 2024-01-04 RX ADMIN — FENTANYL CITRATE 100 MCG: 50 INJECTION, SOLUTION INTRAMUSCULAR; INTRAVENOUS at 08:25

## 2024-01-04 RX ADMIN — MAGNESIUM SULFATE IN WATER 1 G/HR: 40 INJECTION, SOLUTION INTRAVENOUS at 22:12

## 2024-01-04 RX ADMIN — BUPIVACAINE HYDROCHLORIDE 3 ML: 2.5 INJECTION, SOLUTION EPIDURAL; INFILTRATION; INTRACAUDAL at 17:03

## 2024-01-04 RX ADMIN — HYDRALAZINE HYDROCHLORIDE 5 MG: 20 INJECTION, SOLUTION INTRAMUSCULAR; INTRAVENOUS at 19:16

## 2024-01-04 RX ADMIN — MAGNESIUM SULFATE HEPTAHYDRATE 4 G: 4 INJECTION, SOLUTION INTRAVENOUS at 21:46

## 2024-01-04 RX ADMIN — MAGNESIUM SULFATE HEPTAHYDRATE 4 G: 40 INJECTION, SOLUTION INTRAVENOUS at 21:46

## 2024-01-04 RX ADMIN — SODIUM CHLORIDE, SODIUM GLUCONATE, SODIUM ACETATE, POTASSIUM CHLORIDE AND MAGNESIUM CHLORIDE 1000 ML: 526; 502; 368; 37; 30 INJECTION, SOLUTION INTRAVENOUS at 23:59

## 2024-01-04 RX ADMIN — BUPIVACAINE HYDROCHLORIDE 3 ML: 2.5 INJECTION, SOLUTION EPIDURAL; INFILTRATION; INTRACAUDAL at 17:08

## 2024-01-04 ASSESSMENT — LIFESTYLE VARIABLES
HAVE PEOPLE ANNOYED YOU BY CRITICIZING YOUR DRINKING: NO
ALCOHOL_USE: NO
HOW MANY TIMES IN THE PAST YEAR HAVE YOU HAD 5 OR MORE DRINKS IN A DAY: 0
EVER FELT BAD OR GUILTY ABOUT YOUR DRINKING: NO
EVER_SMOKED: NEVER
CONSUMPTION TOTAL: NEGATIVE
TOTAL SCORE: 0
EVER HAD A DRINK FIRST THING IN THE MORNING TO STEADY YOUR NERVES TO GET RID OF A HANGOVER: NO
TOTAL SCORE: 0
DOES PATIENT WANT TO STOP DRINKING: NO
AVERAGE NUMBER OF DAYS PER WEEK YOU HAVE A DRINK CONTAINING ALCOHOL: 0
HAVE YOU EVER FELT YOU SHOULD CUT DOWN ON YOUR DRINKING: NO
ON A TYPICAL DAY WHEN YOU DRINK ALCOHOL HOW MANY DRINKS DO YOU HAVE: 0
TOTAL SCORE: 0

## 2024-01-04 ASSESSMENT — COPD QUESTIONNAIRES
HAVE YOU SMOKED AT LEAST 100 CIGARETTES IN YOUR ENTIRE LIFE: NO/DON'T KNOW
COPD SCREENING SCORE: 0
DURING THE PAST 4 WEEKS HOW MUCH DID YOU FEEL SHORT OF BREATH: NONE/LITTLE OF THE TIME
IN THE PAST 12 MONTHS DO YOU DO LESS THAN YOU USED TO BECAUSE OF YOUR BREATHING PROBLEMS: DISAGREE/UNSURE
DO YOU EVER COUGH UP ANY MUCUS OR PHLEGM?: NO/ONLY WITH OCCASIONAL COLDS OR INFECTIONS

## 2024-01-04 ASSESSMENT — PAIN DESCRIPTION - PAIN TYPE
TYPE: ACUTE PAIN

## 2024-01-04 NOTE — CARE PLAN
The patient is Watcher - Medium risk of patient condition declining or worsening    Shift Goals  Clinical Goals: Patient safety  Patient Goals: Healthy mom, healthy baby, pain control  Family Goals: Support    Progress made toward(s) clinical / shift goals:    Problem: Risk for Infection and Impaired Wound Healing  Goal: Patient will remain free from infection  Description: Target End Date:  1 to 3 days    1.  Utilize Standard Precautions at all times to reduce the risk of transmission of microorganisms from both recognized and unrecognized sources of infection  2.  Infection prevention handouts provided (general/device/diagnosis specific) and documented in Patient Education  3.  Limit vaginal exams as necessary  4.  Use aseptic technique during vaginal exams  5.  Administer antibiotic therapy per provider order  6.  Assess for removal of lines and drains  7.  Line/drain care per policy and/or provider orders  Outcome: Progressing  Note: Patient afebrile with no s/s of infection       Problem: Risk for Injury  Goal: Patient and fetus will be free of preventable injury/complications  Description: Target End Date:  Prior to discharge or change in level of care    1.  Monitor uterine activity and if applicable progression of labor  2.  Monitor fetal well being  3.  Assure resuscitative equipment is available and within reach  Outcome: Progressing  Note: EFM and TOCO applied per orders. RN at bedside hand holding EFM throughout shift.        Patient is not progressing towards the following goals:

## 2024-01-04 NOTE — PROGRESS NOTES
0700 Report rc'd from Alecia FAIR RN. Plan of care discussed and questions addressed. Pt. Resting comfortably with no complaints of pain at this time. FOB Linel at the bedside and supportive. Call light within reach. Care assumed at this time.     0705 Dr. Meehan at bedside SVE 1/50/-4. POC discussed with patient. Patient consents to balloon placement at this time.     0715 Cooks balloon placed at this time. 80U/60V.     [id number] RN at bedside hand holding EFM. Baseline 145, audible accelerations and audible fetal movement. No audible decelerations.     1035 Patient called RN to bedside. Patient states obstetrical balloon fell out while using restroom. RN verified obstetrical balloon no longer in place.   SVE by RN 4-5/60/-3    [id number] RN at bedside hand holding EFM.     1059 RN updated Dr. Meehan via phone on patient status.     [id number] RN continually at bedside adjusting EFM.     1236 ADY Grullon at bedside to adjust EFM.     1256 RN unable to adequately trace FHR and contractions via TOCO. Pitocin stopped at this time.   1310 Dr. Meehan notified by RN of patient status and pitocin being turned off. Orders received to perform SVE and report back to MD.     1330 SVE by RN. 6/60/-3. MD notified by RN of patient exam.     1655 Dr Yu at bedside for epidural placement. Consents signed, timeout performed. Negative test dose at 1705.     [id number] RN at bedside holding EFM     1825 Dr. Meehan at bedside for AROM    1830 AROM/ No fluid at this time. SVE 7/70/-3. IUPC and FSE placed by provider.     1850 Patient reports feeling loss of fluid. Fluid assessed by MD and RN- clear fluid noted.     1900 Report given to ADY Palacios.

## 2024-01-04 NOTE — H&P
"History and Physical      Shelby Calderon is a 28 y.o. female  at 39w6d who presents for questionable LOF yesterday but she did not have SROM. She however had elevated BP and needed Nifedipine to decrease BP. Admitted for IOL with PIH/preeclampsia.    ROS: A comprehensive review of systems was negative.      Social History     Tobacco Use    Smoking status: Never    Smokeless tobacco: Never   Vaping Use    Vaping Use: Never used   Substance Use Topics    Alcohol use: Not Currently    Drug use: Never     Allergies: Patient has no known allergies.    Prenatal care with Meehan starting at 15 weeks with following problems:  Increased BMI    Objective:      BP (!) 153/72   Pulse 83   Temp 36.8 °C (98.3 °F) (Temporal)   Resp 20   Ht 1.626 m (5' 4\")   Wt (!) 150 kg (330 lb)   SpO2 96%     General:   no acute distress, alert, cooperative, morbidly obese   Skin:   normal   HEENT:  Neck supple with midline trachea   Lungs:   CTA bilateral   Heart:   regular rate and rhythm   Abdomen:   gravid, NT   EFW:  7 lbs 8 oz   Pelvis:  adequate with gynecoid pelvis   FHT:  Reactive NST   Uterine Size:    Presentations: Cephalic   Cervix:     Dilation: 1cm    Effacement: 50%    Station:  -3    Consistency: Soft    Position: Middle     Lab Review  Recent Results (from the past 24 hour(s))   Ferning if suspected rupture of membranes (ROM)    Collection Time: 24 11:00 PM   Result Value Ref Range    Fern Test On Amniotic Fluid see below Not present   PROTEIN/CREAT RATIO URINE    Collection Time: 24 11:00 PM   Result Value Ref Range    Total Protein, Urine 25.0 (H) 0.0 - 15.0 mg/dL    Creatinine, Random Urine 110.17 mg/dL    Protein Creatinine Ratio 227 (H) 10 - 107 mg/g   URINALYSIS    Collection Time: 24 11:15 PM    Specimen: Urine, Clean Catch   Result Value Ref Range    Color Yellow     Character Clear     Specific Gravity 1.027 <1.035    Ph 6.0 5.0 - 8.0    Glucose Negative Negative mg/dL    Ketones Negative " Negative mg/dL    Protein Negative Negative mg/dL    Bilirubin Negative Negative    Urobilinogen, Urine 0.2 Negative    Nitrite Negative Negative    Leukocyte Esterase Negative Negative    Occult Blood Negative Negative    Micro Urine Req see below    POCT urinalysis device results    Collection Time: 01/03/24 11:16 PM   Result Value Ref Range    POC Color Su     POC Appearance Clear     POC Glucose Negative Negative mg/dL    POC Ketones Negative Negative mg/dL    POC Specific Gravity >=1.030 (A) 1.005 - 1.030    POC Blood Negative Negative    POC Urine PH 6.0 5.0 - 8.0    POC Protein 30 (A) Negative mg/dL    POC Nitrites Negative Negative    POC Leukocyte Esterase Negative Negative   CBC WITH DIFFERENTIAL    Collection Time: 01/04/24 12:00 AM   Result Value Ref Range    WBC 11.9 (H) 4.8 - 10.8 K/uL    RBC 4.38 4.20 - 5.40 M/uL    Hemoglobin 11.5 (L) 12.0 - 16.0 g/dL    Hematocrit 35.7 (L) 37.0 - 47.0 %    MCV 81.5 81.4 - 97.8 fL    MCH 26.3 (L) 27.0 - 33.0 pg    MCHC 32.2 32.2 - 35.5 g/dL    RDW 46.3 35.9 - 50.0 fL    Platelet Count 405 164 - 446 K/uL    MPV 10.0 9.0 - 12.9 fL    Neutrophils-Polys 71.10 44.00 - 72.00 %    Lymphocytes 21.80 (L) 22.00 - 41.00 %    Monocytes 5.70 0.00 - 13.40 %    Eosinophils 0.30 0.00 - 6.90 %    Basophils 0.30 0.00 - 1.80 %    Immature Granulocytes 0.80 0.00 - 0.90 %    Nucleated RBC 0.00 0.00 - 0.20 /100 WBC    Neutrophils (Absolute) 8.49 (H) 1.82 - 7.42 K/uL    Lymphs (Absolute) 2.60 1.00 - 4.80 K/uL    Monos (Absolute) 0.68 0.00 - 0.85 K/uL    Eos (Absolute) 0.03 0.00 - 0.51 K/uL    Baso (Absolute) 0.03 0.00 - 0.12 K/uL    Immature Granulocytes (abs) 0.09 0.00 - 0.11 K/uL    NRBC (Absolute) 0.00 K/uL   Comp Metabolic Panel    Collection Time: 01/04/24 12:00 AM   Result Value Ref Range    Sodium 138 135 - 145 mmol/L    Potassium 3.9 3.6 - 5.5 mmol/L    Chloride 105 96 - 112 mmol/L    Co2 20 20 - 33 mmol/L    Anion Gap 13.0 7.0 - 16.0    Glucose 93 65 - 99 mg/dL    Bun 13 8 - 22  mg/dL    Creatinine 0.45 (L) 0.50 - 1.40 mg/dL    Calcium 9.2 8.5 - 10.5 mg/dL    Correct Calcium 9.7 8.5 - 10.5 mg/dL    AST(SGOT) 23 12 - 45 U/L    ALT(SGPT) 26 2 - 50 U/L    Alkaline Phosphatase 147 (H) 30 - 99 U/L    Total Bilirubin 0.2 0.1 - 1.5 mg/dL    Albumin 3.4 3.2 - 4.9 g/dL    Total Protein 6.8 6.0 - 8.2 g/dL    Globulin 3.4 1.9 - 3.5 g/dL    A-G Ratio 1.0 g/dL   URIC ACID    Collection Time: 01/04/24 12:00 AM   Result Value Ref Range    Uric Acid 4.1 1.9 - 8.2 mg/dL   T.PALLIDUM AB CRISTIANE (Syphilis)    Collection Time: 01/04/24 12:00 AM   Result Value Ref Range    Syphilis, Treponemal Qual Non-Reactive Non-Reactive   Hold Blood Bank Specimen (Not Tested)    Collection Time: 01/04/24 12:00 AM   Result Value Ref Range    Holding Tube - Bb DONE    ESTIMATED GFR    Collection Time: 01/04/24 12:00 AM   Result Value Ref Range    GFR (CKD-EPI) 132 >60 mL/min/1.73 m 2         Assessment:   Shelby Calderon at 39w6d  PIH/Preeclampsia  Labor status: Not in labor. and IOL   Plan:   Admit to L&D  GBS negative  Pitocin IOL  Cook's Balloon placed for cervical ripening

## 2024-01-04 NOTE — PROGRESS NOTES
0445- Bedside report received from Faye VIDAL, induction POC discussed, care assumed with pt in stable condition. Pt is resting comfortably in room, SO at side, call light within reach.     0700- Bedside report given to Lis VIDAL, induction POC discussed, care relinquished with pt in stable condition.

## 2024-01-04 NOTE — PROGRESS NOTES
28 y.o.  EDC  (39.6 wks), GBS neg per pt     Pt presents to L&D c/o SROM at 1100. Pt states she only felt a little bit of leaking at first, but then at 1900 she felt a bigger gush. Reports occasional cramping and +FM. Denies VB. SSE performed. No pooling or amniotic fluid noted, just small amount of white liquid discharge. SVE /-4, unable to feel presenting part.    2302: Dr. Meehan notified about pt's arrivalland current BP's, orders received to continue serial BP, get PIH workup and an US to confirm presentation.     2334: DR. Meehan notified of latest BP's, orders received for admission and for Nifedipine IR STAT.    2338: Dr. Meehan called for orders clarification.    0039: Dr Meehan notified of confirmed vertex presentation. Orders received for pitocin 2x2.    0100: Report to Faye GARSIA.

## 2024-01-04 NOTE — PROGRESS NOTES
0100: Report received from Gaby GARSIA. POC discussed. Care assumed.   0115: Pt transferred from LDA2 to 222 via wheelchair in stable condition with RN/FOB at side.   0140: Dr Meehan updated on 2 recent Bps - orders received from MD to cycle Bps q1hour.   0445: report given to Alecia VARGAS RN. POC discussed. Care relinquished.

## 2024-01-04 NOTE — CARE PLAN
The patient is Watcher - Medium risk of patient condition declining or worsening    Shift Goals  Problem: Knowledge Deficit - L&D  Goal: Patient and family/caregivers will demonstrate understanding of plan of care, disease process/condition, diagnostic tests and medications  1/4/2024 0508 by Faye Andujar R.N.  Outcome: Progressing  1/4/2024 0508 by MARV Wright.[email or web address].  Outcome: Progressing     Problem: Psychosocial - L&D  Goal: Patient's level of anxiety will decrease  1/4/2024 0508 by Faye Andujar R.N.  Outcome: Progressing  1/4/2024 0508 by Faye Andujar R.N.  Outcome: Progressing     Clinical Goals: cervical change  Patient Goals: healthy mom healthy baby  Family Goals: support

## 2024-01-05 LAB
ERYTHROCYTE [DISTWIDTH] IN BLOOD BY AUTOMATED COUNT: 47.3 FL (ref 35.9–50)
HCT VFR BLD AUTO: 27.5 % (ref 37–47)
HGB BLD-MCNC: 8.9 G/DL (ref 12–16)
MCH RBC QN AUTO: 26.6 PG (ref 27–33)
MCHC RBC AUTO-ENTMCNC: 32.4 G/DL (ref 32.2–35.5)
MCV RBC AUTO: 82.1 FL (ref 81.4–97.8)
PLATELET # BLD AUTO: 328 K/UL (ref 164–446)
PMV BLD AUTO: 10.3 FL (ref 9–12.9)
RBC # BLD AUTO: 3.35 M/UL (ref 4.2–5.4)
WBC # BLD AUTO: 14.6 K/UL (ref 4.8–10.8)

## 2024-01-05 PROCEDURE — 160041 HCHG SURGERY MINUTES - EA ADDL 1 MIN LEVEL 4: Performed by: OBSTETRICS & GYNECOLOGY

## 2024-01-05 PROCEDURE — 160035 HCHG PACU - 1ST 60 MINS PHASE I: Performed by: OBSTETRICS & GYNECOLOGY

## 2024-01-05 PROCEDURE — 700111 HCHG RX REV CODE 636 W/ 250 OVERRIDE (IP): Performed by: OBSTETRICS & GYNECOLOGY

## 2024-01-05 PROCEDURE — 90715 TDAP VACCINE 7 YRS/> IM: CPT | Performed by: OBSTETRICS & GYNECOLOGY

## 2024-01-05 PROCEDURE — A9270 NON-COVERED ITEM OR SERVICE: HCPCS | Performed by: ANESTHESIOLOGY

## 2024-01-05 PROCEDURE — 160002 HCHG RECOVERY MINUTES (STAT): Performed by: OBSTETRICS & GYNECOLOGY

## 2024-01-05 PROCEDURE — 90471 IMMUNIZATION ADMIN: CPT

## 2024-01-05 PROCEDURE — 700105 HCHG RX REV CODE 258: Performed by: OBSTETRICS & GYNECOLOGY

## 2024-01-05 PROCEDURE — 700102 HCHG RX REV CODE 250 W/ 637 OVERRIDE(OP): Performed by: ANESTHESIOLOGY

## 2024-01-05 PROCEDURE — 36415 COLL VENOUS BLD VENIPUNCTURE: CPT

## 2024-01-05 PROCEDURE — 160048 HCHG OR STATISTICAL LEVEL 1-5: Performed by: OBSTETRICS & GYNECOLOGY

## 2024-01-05 PROCEDURE — 770002 HCHG ROOM/CARE - OB PRIVATE (112)

## 2024-01-05 PROCEDURE — C1755 CATHETER, INTRASPINAL: HCPCS | Performed by: OBSTETRICS & GYNECOLOGY

## 2024-01-05 PROCEDURE — 160029 HCHG SURGERY MINUTES - 1ST 30 MINS LEVEL 4: Performed by: OBSTETRICS & GYNECOLOGY

## 2024-01-05 PROCEDURE — 700111 HCHG RX REV CODE 636 W/ 250 OVERRIDE (IP): Mod: JZ | Performed by: ANESTHESIOLOGY

## 2024-01-05 PROCEDURE — 700105 HCHG RX REV CODE 258: Performed by: ANESTHESIOLOGY

## 2024-01-05 PROCEDURE — 700102 HCHG RX REV CODE 250 W/ 637 OVERRIDE(OP): Performed by: OBSTETRICS & GYNECOLOGY

## 2024-01-05 PROCEDURE — 85027 COMPLETE CBC AUTOMATED: CPT

## 2024-01-05 PROCEDURE — A9270 NON-COVERED ITEM OR SERVICE: HCPCS | Performed by: OBSTETRICS & GYNECOLOGY

## 2024-01-05 PROCEDURE — 700101 HCHG RX REV CODE 250: Performed by: ANESTHESIOLOGY

## 2024-01-05 PROCEDURE — 160009 HCHG ANES TIME/MIN: Performed by: OBSTETRICS & GYNECOLOGY

## 2024-01-05 PROCEDURE — 3E033VJ INTRODUCTION OF OTHER HORMONE INTO PERIPHERAL VEIN, PERCUTANEOUS APPROACH: ICD-10-PCS | Performed by: OBSTETRICS & GYNECOLOGY

## 2024-01-05 RX ORDER — ACETAMINOPHEN 500 MG
1000 TABLET ORAL EVERY 6 HOURS PRN
Status: DISCONTINUED | OUTPATIENT
Start: 2024-01-09 | End: 2024-01-05

## 2024-01-05 RX ORDER — ACETAMINOPHEN 500 MG
1000 TABLET ORAL EVERY 6 HOURS
Status: DISCONTINUED | OUTPATIENT
Start: 2024-01-05 | End: 2024-01-07 | Stop reason: HOSPADM

## 2024-01-05 RX ORDER — KETOROLAC TROMETHAMINE 30 MG/ML
INJECTION, SOLUTION INTRAMUSCULAR; INTRAVENOUS PRN
Status: DISCONTINUED | OUTPATIENT
Start: 2024-01-05 | End: 2024-01-05 | Stop reason: SURG

## 2024-01-05 RX ORDER — OXYCODONE HCL 5 MG/5 ML
5 SOLUTION, ORAL ORAL
Status: COMPLETED | OUTPATIENT
Start: 2024-01-05 | End: 2024-01-05

## 2024-01-05 RX ORDER — IBUPROFEN 800 MG/1
800 TABLET ORAL EVERY 8 HOURS PRN
Status: DISCONTINUED | OUTPATIENT
Start: 2024-01-08 | End: 2024-01-07 | Stop reason: HOSPADM

## 2024-01-05 RX ORDER — IBUPROFEN 800 MG/1
800 TABLET ORAL EVERY 8 HOURS
Status: DISCONTINUED | OUTPATIENT
Start: 2024-01-06 | End: 2024-01-05

## 2024-01-05 RX ORDER — VITAMIN A ACETATE, BETA CAROTENE, ASCORBIC ACID, CHOLECALCIFEROL, .ALPHA.-TOCOPHEROL ACETATE, DL-, THIAMINE MONONITRATE, RIBOFLAVIN, NIACINAMIDE, PYRIDOXINE HYDROCHLORIDE, FOLIC ACID, CYANOCOBALAMIN, CALCIUM CARBONATE, FERROUS FUMARATE, ZINC OXIDE, CUPRIC OXIDE 3080; 12; 120; 400; 1; 1.84; 3; 20; 22; 920; 25; 200; 27; 10; 2 [IU]/1; UG/1; MG/1; [IU]/1; MG/1; MG/1; MG/1; MG/1; MG/1; [IU]/1; MG/1; MG/1; MG/1; MG/1; MG/1
1 TABLET, FILM COATED ORAL
Status: DISCONTINUED | OUTPATIENT
Start: 2024-01-05 | End: 2024-01-07 | Stop reason: HOSPADM

## 2024-01-05 RX ORDER — HALOPERIDOL 5 MG/ML
1 INJECTION INTRAMUSCULAR
Status: DISCONTINUED | OUTPATIENT
Start: 2024-01-05 | End: 2024-01-05 | Stop reason: HOSPADM

## 2024-01-05 RX ORDER — ONDANSETRON 4 MG/1
4 TABLET, ORALLY DISINTEGRATING ORAL EVERY 6 HOURS PRN
Status: DISCONTINUED | OUTPATIENT
Start: 2024-01-05 | End: 2024-01-07 | Stop reason: HOSPADM

## 2024-01-05 RX ORDER — DIPHENHYDRAMINE HYDROCHLORIDE 50 MG/ML
25 INJECTION INTRAMUSCULAR; INTRAVENOUS EVERY 6 HOURS PRN
Status: DISCONTINUED | OUTPATIENT
Start: 2024-01-05 | End: 2024-01-07 | Stop reason: HOSPADM

## 2024-01-05 RX ORDER — ONDANSETRON 2 MG/ML
4 INJECTION INTRAMUSCULAR; INTRAVENOUS
Status: DISCONTINUED | OUTPATIENT
Start: 2024-01-05 | End: 2024-01-05 | Stop reason: HOSPADM

## 2024-01-05 RX ORDER — OXYCODONE HYDROCHLORIDE 10 MG/1
10 TABLET ORAL EVERY 4 HOURS PRN
Status: DISCONTINUED | OUTPATIENT
Start: 2024-01-05 | End: 2024-01-07 | Stop reason: HOSPADM

## 2024-01-05 RX ORDER — HYDRALAZINE HYDROCHLORIDE 20 MG/ML
5 INJECTION INTRAMUSCULAR; INTRAVENOUS
Status: DISCONTINUED | OUTPATIENT
Start: 2024-01-05 | End: 2024-01-05 | Stop reason: HOSPADM

## 2024-01-05 RX ORDER — DIPHENHYDRAMINE HYDROCHLORIDE 50 MG/ML
INJECTION INTRAMUSCULAR; INTRAVENOUS PRN
Status: DISCONTINUED | OUTPATIENT
Start: 2024-01-05 | End: 2024-01-05 | Stop reason: SURG

## 2024-01-05 RX ORDER — OXYCODONE HYDROCHLORIDE 5 MG/1
5 TABLET ORAL EVERY 4 HOURS PRN
Status: DISCONTINUED | OUTPATIENT
Start: 2024-01-06 | End: 2024-01-05

## 2024-01-05 RX ORDER — BISACODYL 10 MG
10 SUPPOSITORY, RECTAL RECTAL PRN
Status: DISCONTINUED | OUTPATIENT
Start: 2024-01-05 | End: 2024-01-07 | Stop reason: HOSPADM

## 2024-01-05 RX ORDER — IBUPROFEN 800 MG/1
800 TABLET ORAL EVERY 8 HOURS PRN
Status: DISCONTINUED | OUTPATIENT
Start: 2024-01-09 | End: 2024-01-05

## 2024-01-05 RX ORDER — DIPHENHYDRAMINE HCL 25 MG
25 TABLET ORAL EVERY 6 HOURS PRN
Status: DISCONTINUED | OUTPATIENT
Start: 2024-01-06 | End: 2024-01-05

## 2024-01-05 RX ORDER — OXYCODONE HCL 5 MG/5 ML
10 SOLUTION, ORAL ORAL
Status: COMPLETED | OUTPATIENT
Start: 2024-01-05 | End: 2024-01-05

## 2024-01-05 RX ORDER — SIMETHICONE 125 MG
125 TABLET,CHEWABLE ORAL 4 TIMES DAILY PRN
Status: DISCONTINUED | OUTPATIENT
Start: 2024-01-05 | End: 2024-01-07 | Stop reason: HOSPADM

## 2024-01-05 RX ORDER — SCOLOPAMINE TRANSDERMAL SYSTEM 1 MG/1
PATCH, EXTENDED RELEASE TRANSDERMAL PRN
Status: DISCONTINUED | OUTPATIENT
Start: 2024-01-05 | End: 2024-01-05 | Stop reason: SURG

## 2024-01-05 RX ORDER — IBUPROFEN 800 MG/1
800 TABLET ORAL EVERY 8 HOURS
Status: DISCONTINUED | OUTPATIENT
Start: 2024-01-05 | End: 2024-01-07 | Stop reason: HOSPADM

## 2024-01-05 RX ORDER — ONDANSETRON 2 MG/ML
INJECTION INTRAMUSCULAR; INTRAVENOUS PRN
Status: DISCONTINUED | OUTPATIENT
Start: 2024-01-05 | End: 2024-01-05 | Stop reason: SURG

## 2024-01-05 RX ORDER — ONDANSETRON 4 MG/1
4 TABLET, ORALLY DISINTEGRATING ORAL EVERY 6 HOURS PRN
Status: DISCONTINUED | OUTPATIENT
Start: 2024-01-06 | End: 2024-01-05

## 2024-01-05 RX ORDER — DEXAMETHASONE SODIUM PHOSPHATE 4 MG/ML
INJECTION, SOLUTION INTRA-ARTICULAR; INTRALESIONAL; INTRAMUSCULAR; INTRAVENOUS; SOFT TISSUE PRN
Status: DISCONTINUED | OUTPATIENT
Start: 2024-01-05 | End: 2024-01-05 | Stop reason: SURG

## 2024-01-05 RX ORDER — CALCIUM GLUCONATE 94 MG/ML
1 INJECTION, SOLUTION INTRAVENOUS
Status: DISCONTINUED | OUTPATIENT
Start: 2024-01-05 | End: 2024-01-05

## 2024-01-05 RX ORDER — LABETALOL 100 MG/1
200 TABLET, FILM COATED ORAL TWICE DAILY
Status: DISCONTINUED | OUTPATIENT
Start: 2024-01-05 | End: 2024-01-07 | Stop reason: HOSPADM

## 2024-01-05 RX ORDER — OXYCODONE HYDROCHLORIDE 10 MG/1
10 TABLET ORAL EVERY 4 HOURS PRN
Status: DISCONTINUED | OUTPATIENT
Start: 2024-01-06 | End: 2024-01-05

## 2024-01-05 RX ORDER — OXYCODONE HYDROCHLORIDE 5 MG/1
5 TABLET ORAL EVERY 4 HOURS PRN
Status: DISCONTINUED | OUTPATIENT
Start: 2024-01-05 | End: 2024-01-07 | Stop reason: HOSPADM

## 2024-01-05 RX ORDER — SODIUM CHLORIDE, SODIUM LACTATE, POTASSIUM CHLORIDE, CALCIUM CHLORIDE 600; 310; 30; 20 MG/100ML; MG/100ML; MG/100ML; MG/100ML
INJECTION, SOLUTION INTRAVENOUS CONTINUOUS
Status: DISCONTINUED | OUTPATIENT
Start: 2024-01-05 | End: 2024-01-05 | Stop reason: HOSPADM

## 2024-01-05 RX ORDER — CALCIUM CARBONATE 500 MG/1
1000 TABLET, CHEWABLE ORAL EVERY 6 HOURS PRN
Status: DISCONTINUED | OUTPATIENT
Start: 2024-01-05 | End: 2024-01-07 | Stop reason: HOSPADM

## 2024-01-05 RX ORDER — ONDANSETRON 2 MG/ML
4 INJECTION INTRAMUSCULAR; INTRAVENOUS EVERY 6 HOURS PRN
Status: DISCONTINUED | OUTPATIENT
Start: 2024-01-05 | End: 2024-01-07 | Stop reason: HOSPADM

## 2024-01-05 RX ORDER — HYDROMORPHONE HYDROCHLORIDE 1 MG/ML
0.5 INJECTION, SOLUTION INTRAMUSCULAR; INTRAVENOUS; SUBCUTANEOUS
Status: DISCONTINUED | OUTPATIENT
Start: 2024-01-05 | End: 2024-01-05 | Stop reason: HOSPADM

## 2024-01-05 RX ORDER — DOCUSATE SODIUM 100 MG/1
100 CAPSULE, LIQUID FILLED ORAL 2 TIMES DAILY PRN
Status: DISCONTINUED | OUTPATIENT
Start: 2024-01-05 | End: 2024-01-07 | Stop reason: HOSPADM

## 2024-01-05 RX ORDER — ACETAMINOPHEN 500 MG
1000 TABLET ORAL EVERY 6 HOURS PRN
Status: DISCONTINUED | OUTPATIENT
Start: 2024-01-08 | End: 2024-01-07 | Stop reason: HOSPADM

## 2024-01-05 RX ORDER — DIPHENHYDRAMINE HCL 25 MG
25 TABLET ORAL EVERY 6 HOURS PRN
Status: DISCONTINUED | OUTPATIENT
Start: 2024-01-05 | End: 2024-01-07 | Stop reason: HOSPADM

## 2024-01-05 RX ORDER — HYDROMORPHONE HYDROCHLORIDE 1 MG/ML
0.2 INJECTION, SOLUTION INTRAMUSCULAR; INTRAVENOUS; SUBCUTANEOUS
Status: DISCONTINUED | OUTPATIENT
Start: 2024-01-05 | End: 2024-01-05 | Stop reason: HOSPADM

## 2024-01-05 RX ORDER — ONDANSETRON 2 MG/ML
4 INJECTION INTRAMUSCULAR; INTRAVENOUS EVERY 6 HOURS PRN
Status: DISCONTINUED | OUTPATIENT
Start: 2024-01-06 | End: 2024-01-05

## 2024-01-05 RX ORDER — ACETAMINOPHEN 500 MG
1000 TABLET ORAL EVERY 6 HOURS
Status: DISCONTINUED | OUTPATIENT
Start: 2024-01-06 | End: 2024-01-05

## 2024-01-05 RX ORDER — DIPHENHYDRAMINE HYDROCHLORIDE 50 MG/ML
12.5 INJECTION INTRAMUSCULAR; INTRAVENOUS
Status: DISCONTINUED | OUTPATIENT
Start: 2024-01-05 | End: 2024-01-05 | Stop reason: HOSPADM

## 2024-01-05 RX ORDER — DIPHENHYDRAMINE HYDROCHLORIDE 50 MG/ML
25 INJECTION INTRAMUSCULAR; INTRAVENOUS EVERY 6 HOURS PRN
Status: DISCONTINUED | OUTPATIENT
Start: 2024-01-06 | End: 2024-01-05

## 2024-01-05 RX ORDER — SODIUM CHLORIDE, SODIUM LACTATE, POTASSIUM CHLORIDE, CALCIUM CHLORIDE 600; 310; 30; 20 MG/100ML; MG/100ML; MG/100ML; MG/100ML
INJECTION, SOLUTION INTRAVENOUS PRN
Status: DISCONTINUED | OUTPATIENT
Start: 2024-01-05 | End: 2024-01-07 | Stop reason: HOSPADM

## 2024-01-05 RX ORDER — LIDOCAINE HCL/EPINEPHRINE/PF 2%-1:200K
VIAL (ML) INJECTION PRN
Status: DISCONTINUED | OUTPATIENT
Start: 2024-01-05 | End: 2024-01-05 | Stop reason: SURG

## 2024-01-05 RX ORDER — OXYTOCIN 10 [USP'U]/ML
INJECTION, SOLUTION INTRAMUSCULAR; INTRAVENOUS PRN
Status: DISCONTINUED | OUTPATIENT
Start: 2024-01-05 | End: 2024-01-05 | Stop reason: SURG

## 2024-01-05 RX ORDER — SODIUM CHLORIDE, SODIUM GLUCONATE, SODIUM ACETATE, POTASSIUM CHLORIDE AND MAGNESIUM CHLORIDE 526; 502; 368; 37; 30 MG/100ML; MG/100ML; MG/100ML; MG/100ML; MG/100ML
INJECTION, SOLUTION INTRAVENOUS
Status: DISCONTINUED | OUTPATIENT
Start: 2024-01-05 | End: 2024-01-05 | Stop reason: HOSPADM

## 2024-01-05 RX ORDER — MEPERIDINE HYDROCHLORIDE 25 MG/ML
12.5 INJECTION INTRAMUSCULAR; INTRAVENOUS; SUBCUTANEOUS
Status: DISCONTINUED | OUTPATIENT
Start: 2024-01-05 | End: 2024-01-05 | Stop reason: HOSPADM

## 2024-01-05 RX ORDER — HYDROMORPHONE HYDROCHLORIDE 1 MG/ML
0.4 INJECTION, SOLUTION INTRAMUSCULAR; INTRAVENOUS; SUBCUTANEOUS
Status: DISCONTINUED | OUTPATIENT
Start: 2024-01-05 | End: 2024-01-05 | Stop reason: HOSPADM

## 2024-01-05 RX ORDER — CEFAZOLIN SODIUM 1 G/3ML
INJECTION, POWDER, FOR SOLUTION INTRAMUSCULAR; INTRAVENOUS PRN
Status: DISCONTINUED | OUTPATIENT
Start: 2024-01-05 | End: 2024-01-05 | Stop reason: SURG

## 2024-01-05 RX ADMIN — SODIUM CHLORIDE, POTASSIUM CHLORIDE, SODIUM LACTATE AND CALCIUM CHLORIDE: 600; 310; 30; 20 INJECTION, SOLUTION INTRAVENOUS at 05:42

## 2024-01-05 RX ADMIN — OXYTOCIN 20 UNITS: 10 INJECTION, SOLUTION INTRAMUSCULAR; INTRAVENOUS at 00:39

## 2024-01-05 RX ADMIN — OXYCODONE HYDROCHLORIDE 10 MG: 10 TABLET ORAL at 22:01

## 2024-01-05 RX ADMIN — SODIUM BICARBONATE 2 ML: 84 INJECTION, SOLUTION INTRAVENOUS at 00:16

## 2024-01-05 RX ADMIN — IBUPROFEN 800 MG: 800 TABLET, FILM COATED ORAL at 07:08

## 2024-01-05 RX ADMIN — DEXAMETHASONE SODIUM PHOSPHATE 8 MG: 4 INJECTION INTRA-ARTICULAR; INTRALESIONAL; INTRAMUSCULAR; INTRAVENOUS; SOFT TISSUE at 00:41

## 2024-01-05 RX ADMIN — LABETALOL HYDROCHLORIDE 200 MG: 100 TABLET, FILM COATED ORAL at 18:02

## 2024-01-05 RX ADMIN — DIPHENHYDRAMINE HYDROCHLORIDE 25 MG: 50 INJECTION, SOLUTION INTRAMUSCULAR; INTRAVENOUS at 00:41

## 2024-01-05 RX ADMIN — OXYCODONE HYDROCHLORIDE 5 MG: 5 TABLET ORAL at 18:04

## 2024-01-05 RX ADMIN — KETOROLAC TROMETHAMINE 30 MG: 30 INJECTION, SOLUTION INTRAMUSCULAR; INTRAVENOUS at 00:50

## 2024-01-05 RX ADMIN — FENTANYL CITRATE 100 MCG: 50 INJECTION, SOLUTION INTRAMUSCULAR; INTRAVENOUS at 00:16

## 2024-01-05 RX ADMIN — ACETAMINOPHEN 1000 MG: 500 TABLET ORAL at 01:57

## 2024-01-05 RX ADMIN — SCOPOLAMINE 1 PATCH: 1.5 PATCH, EXTENDED RELEASE TRANSDERMAL at 00:41

## 2024-01-05 RX ADMIN — CEFAZOLIN 3 G: 1 INJECTION, POWDER, FOR SOLUTION INTRAMUSCULAR; INTRAVENOUS at 00:16

## 2024-01-05 RX ADMIN — IBUPROFEN 800 MG: 800 TABLET, FILM COATED ORAL at 21:56

## 2024-01-05 RX ADMIN — ACETAMINOPHEN 1000 MG: 500 TABLET ORAL at 07:08

## 2024-01-05 RX ADMIN — IBUPROFEN 800 MG: 800 TABLET, FILM COATED ORAL at 13:04

## 2024-01-05 RX ADMIN — OXYTOCIN 500 ML: 10 INJECTION, SOLUTION INTRAMUSCULAR; INTRAVENOUS at 01:03

## 2024-01-05 RX ADMIN — TETANUS TOXOID, REDUCED DIPHTHERIA TOXOID AND ACELLULAR PERTUSSIS VACCINE, ADSORBED 0.5 ML: 5; 2.5; 8; 8; 2.5 SUSPENSION INTRAMUSCULAR at 18:01

## 2024-01-05 RX ADMIN — ACETAMINOPHEN 1000 MG: 500 TABLET ORAL at 13:04

## 2024-01-05 RX ADMIN — PHENYLEPHRINE HYDROCHLORIDE 30 MCG/MIN: 10 INJECTION INTRAVENOUS at 00:19

## 2024-01-05 RX ADMIN — PRENATAL WITH FERROUS FUM AND FOLIC ACID 1 TABLET: 3080; 920; 120; 400; 22; 1.84; 3; 20; 10; 1; 12; 200; 27; 25; 2 TABLET ORAL at 07:50

## 2024-01-05 RX ADMIN — LIDOCAINE HYDROCHLORIDE,EPINEPHRINE BITARTRATE 17 ML: 20; .005 INJECTION, SOLUTION EPIDURAL; INFILTRATION; INTRACAUDAL; PERINEURAL at 00:16

## 2024-01-05 RX ADMIN — LABETALOL HYDROCHLORIDE 200 MG: 100 TABLET, FILM COATED ORAL at 02:56

## 2024-01-05 RX ADMIN — ONDANSETRON 4 MG: 2 INJECTION INTRAMUSCULAR; INTRAVENOUS at 00:41

## 2024-01-05 RX ADMIN — ACETAMINOPHEN 1000 MG: 500 TABLET ORAL at 18:02

## 2024-01-05 RX ADMIN — SODIUM CHLORIDE, SODIUM GLUCONATE, SODIUM ACETATE, POTASSIUM CHLORIDE AND MAGNESIUM CHLORIDE: 526; 502; 368; 37; 30 INJECTION, SOLUTION INTRAVENOUS at 00:16

## 2024-01-05 RX ADMIN — OXYCODONE HYDROCHLORIDE 10 MG: 5 SOLUTION ORAL at 01:57

## 2024-01-05 ASSESSMENT — PAIN DESCRIPTION - PAIN TYPE
TYPE: ACUTE PAIN;SURGICAL PAIN

## 2024-01-05 ASSESSMENT — EDINBURGH POSTNATAL DEPRESSION SCALE (EPDS)
I HAVE FELT SAD OR MISERABLE: NO, NOT AT ALL
I HAVE BLAMED MYSELF UNNECESSARILY WHEN THINGS WENT WRONG: NOT VERY OFTEN
I HAVE BEEN SO UNHAPPY THAT I HAVE HAD DIFFICULTY SLEEPING: YES, SOMETIMES
I HAVE FELT SCARED OR PANICKY FOR NO GOOD REASON: NO, NOT AT ALL
I HAVE BEEN ABLE TO LAUGH AND SEE THE FUNNY SIDE OF THINGS: AS MUCH AS I ALWAYS COULD
THE THOUGHT OF HARMING MYSELF HAS OCCURRED TO ME: NEVER
I HAVE BEEN SO UNHAPPY THAT I HAVE BEEN CRYING: NO, NEVER
I HAVE BEEN ANXIOUS OR WORRIED FOR NO GOOD REASON: NO, NOT AT ALL
THINGS HAVE BEEN GETTING ON TOP OF ME: NO, MOST OF THE TIME I HAVE COPED QUITE WELL
I HAVE LOOKED FORWARD WITH ENJOYMENT TO THINGS: AS MUCH AS I EVER DID

## 2024-01-05 ASSESSMENT — PAIN SCALES - GENERAL: PAIN_LEVEL: 2

## 2024-01-05 NOTE — PROGRESS NOTES
Pt with current issues:  No cervical change in 4 hours however inadequate contractions pattern for most of last 3 hours. MVU currently 130.  Elevated BP up to 170s systolic. Now on 3rd antihypertensive protocol to treat BP. Started with Hydralazine followed by Nifedipine and now with labetalol  Will start MgSo4 for seizure prevention secondary to very elevated systolic BP- Labs drawn again for preeclampsia    Discussed above issues with patient and  of concerns and they would like to continue with continuation of pitocin induction and start of MgSO4 for seizure prophylaxis. Will continue with antihypertensives as well.

## 2024-01-05 NOTE — ANESTHESIA PROCEDURE NOTES
Epidural Block    Date/Time: 1/4/2024 4:57 PM    Performed by: William Yu M.D.  Authorized by: William Yu M.D.    Patient Location:  OB  Start Time:  1/4/2024 4:57 PM  End Time:  1/4/2024 5:03 PM  Reason for Block: labor analgesia    patient identified, IV checked, site marked, risks and benefits discussed, surgical consent, monitors and equipment checked and pre-op evaluation    Patient Position:  Sitting  Prep: ChloraPrep, patient draped and sterile technique    Monitoring:  Blood pressure, continuous pulse oximetry and heart rate  Approach:  Midline  Location:  L2-L3  Injection Technique:  RALPH saline  Skin infiltration:  Lidocaine  Strength:  1%  Dose:  3ml  Needle Type:  Tuohy  Needle Gauge:  17 G  Needle Length:  3.5 in  Loss of resistance::  9  Catheter Size:  19 G  Catheter at Skin Depth:  18  Test Dose Result:  Negative   Success on 2nd pass at same level  No heme/CSF  Catheter threaded easily  Negative aspiration  No evidence of complications  Patient comfortable after loading dose

## 2024-01-05 NOTE — ANESTHESIA PREPROCEDURE EVALUATION
Date: 24  Procedure: Labor Epidural        maddox pregnancy at 39 weeks gestation.  BMI 56    Relevant Problems   No relevant active problems       Physical Exam    Airway   Mallampati: II  TM distance: >3 FB  Neck ROM: full       Cardiovascular - normal exam  Rhythm: regular  Rate: normal  (-) murmur     Dental - normal exam           Pulmonary - normal exam  Breath sounds clear to auscultation     Abdominal    Neurological - normal exam                   Anesthesia Plan    ASA 3   ASA physical status 3 criteria: morbid obesity - BMI greater than or equal to 40    Plan - epidural   Neuraxial block will be labor analgesia                  Pertinent diagnostic labs and testing reviewed    Informed Consent:    Anesthetic plan and risks discussed with patient.

## 2024-01-05 NOTE — ANESTHESIA TIME REPORT
Anesthesia Start and Stop Event Times       Date Time Event    1/4/2024 1622 Ready for Procedure     1657 Anesthesia Start    1/5/2024 0123 Anesthesia Stop          Responsible Staff  01/04/24 to 01/05/24      Name Role Begin End    William Yu M.D. Anesth 1657 0123          Overtime Reason:  no overtime (within assigned shift)    Comments:

## 2024-01-05 NOTE — ANESTHESIA POSTPROCEDURE EVALUATION
Patient: Shelby Calderon    Procedure Summary       Date: 24 Room / Location: [facility] OR 02 / SURGERY LABOR AND DELIVERY    Anesthesia Start:  Anesthesia Stop: 24    Procedure:  SECTION, PRIMARY (Bilateral: Abdomen) Diagnosis: (Delivered arrest of descent and dilation)    Surgeons: Araseli Meehan M.D. Responsible Provider: William Yu M.D.    Anesthesia Type: epidural ASA Status: 3            Final Anesthesia Type: epidural  Last vitals  BP   Blood Pressure: (!) 140/66    Temp   36.2 °C (97.1 °F)    Pulse   82   Resp   16    SpO2   92 %      Anesthesia Post Evaluation    Patient location during evaluation: PACU  Patient participation: complete - patient participated  Level of consciousness: awake and alert  Pain score: 2    Airway patency: patent  Anesthetic complications: no  Cardiovascular status: hemodynamically stable  Respiratory status: acceptable  Hydration status: euvolemic    PONV: none          No notable events documented.     Nurse Pain Score: 2 (NPRS)

## 2024-01-05 NOTE — PROGRESS NOTES
Pt doing well with minimal discomfort. Minimal to light vaginal bleeding. Will try to breast feed. Tolerating PO intake.    Vitals:    01/05/24 1000   BP: 113/48   Pulse: 68   Resp: 18   Temp: 36.8 °C (98.2 °F)   SpO2: 97%     Alert female in NAD  Abd obese, ND, soft, NT x 4  Incision C/D/I  Lochia light  Ext NT    A/P  POD 0.5 Of Primary C/S and preeclampsia  BP are now well controlled  Ambulate

## 2024-01-05 NOTE — CARE PLAN
The patient is Stable - Low risk of patient condition declining or worsening    Shift Goals  Clinical Goals: Pain control, lochia WDL  Patient Goals: Healthy mom and healthy baby  Family Goals: Support    Progress made toward(s) clinical / shift goals:  Pain well controlled     Patient is not progressing towards the following goals:

## 2024-01-05 NOTE — OP REPORT
DATE OF SERVICE:  2024     PREOPERATIVE DIAGNOSES:   1.  Term intrauterine pregnancy at 40 weeks gestation.  2.  Failure of descent.  3.  Preeclampsia.  4.  Morbid obesity.     POSTOPERATIVE DIAGNOSES:    1.  Term intrauterine pregnancy at 40 weeks gestation.  2.  Failure of descent.  3.  Preeclampsia.  4.  Morbid obesity.     PROCEDURE PERFORMED:  Primary low transverse  section.     SURGEON:  Araseli Meehan MD     ASSISTANT:  Mali Fierro MD     ANESTHESIA:  Epidural.     ANESTHESIOLOGIST:  William Yu MD     ESTIMATED BLOOD LOSS:  500 mL.     FINDINGS:  A viable male infant, Apgars of 8 and 9, weight of 7 pounds 15   ounces with normal uterus, tubes, and ovaries bilaterally.     DESCRIPTION OF PROCEDURE:  The patient was taken to the operating room where   epidural anesthesia was bolused.  The patient was then prepped and draped in   the usual sterile manner.  A formal time-out was called and IV antibiotics   were given with both azithromycin and Ancef.  A Pfannenstiel incision was made   with a knife down to the rectus fascia.  The rectus fascia was  from   the underlying rectus muscle first superiorly, then inferiorly.  The rectus   muscle was  sharply in the midline.  The peritoneum was entered   bluntly and incision was extended with care to avoid injury to underlying   bowel and bladder.  A 4 cm incision was made in the lower uterine segment   transversely and then the incision was extended bluntly.  The head was then   guided towards the hysterotomy incision and delivered.  The mouth and air   suctioned.  The remainder of infant delivered without complications.  After a   30-second delay, the cord was doubly clamped and cut and the infant was handed   off to awaiting neonatology team.  The placenta was then manually extracted.    Fragments of membranes were removed.  The hysterotomy incision was   approximated with 0 chromic in a running locking stitch x1.   Hemostasis was   noted.  The pericolic gutters were examined. Blood clots were removed.  The   muscle and peritoneum was approximated with 4 interrupted sutures of 0   chromic.  The fascia was approximated with #1 Vicryl.  The subcutaneous fat   was irrigated. Small bleeders were bovied.  The subcutaneous fat was   approximated with 4 interrupted sutures of #1 Vicryl.  The skin was   approximated with Insorb subcuticular staples followed by Dermabond glue and   Prineo mesh.  Tegaderm was used for dressing.  Sponge, needle, instrument, and   lap counts were correct x2.  The patient tolerated the procedure well and   went to recovery room in stable condition.        ______________________________  MD MATA Arenas/DEMARIO    DD:  01/05/2024 02:07  DT:  01/05/2024 03:31    Job#:  [id number]

## 2024-01-05 NOTE — CARE PLAN
Problem: Pain - Standard  Goal: Alleviation of pain or a reduction in pain to the patient’s comfort goal  Outcome: Progressing     Problem: Altered Physiologic Condition  Goal: Patient physiologically stable as evidenced by normal lochia, palpable uterine involution and vitals within normal limits  Outcome: Progressing     Problem: Infection - Postpartum  Goal: Postpartum patient will be free of signs and symptoms of infection  Outcome: Progressing     The patient is Watcher - Medium risk of patient condition declining or worsening    Shift Goals  Clinical Goals: Pain control, lochia WDL  Patient Goals: Rest, pain control  Family Goals: Support    Progress made toward(s) clinical / shift goals:  Pain well controlled with non-narcotic analgesia at this time. Lochia remains light without clots expressed, performing jethro care and pad changes as needed. No s/s infection noted on assessment, remains afebrile. Monitoring BP per protocol, WDL at this time.    Patient is not progressing towards the following goals: NA

## 2024-01-05 NOTE — PROGRESS NOTES
"Shelby Calderon at 39w6d admitted for IOL secondary to PIH      Subjective: negative for CTXS.  positive for feeling pain from CTXs. negative for LOF. negative for vaginal bleeding.   Pain is well controlled: yes. Desires epidural: yes.    BP (!) 165/75   Pulse 83   Temp 36.1 °C (97 °F) (Temporal)   Resp 20   Ht 1.626 m (5' 4\")   Wt (!) 150 kg (330 lb)   SpO2 98%     Physical exam  FHT reactive, with accelerations  SVE 7/70/-3  AROM yes with clear fluid  CTXs Q 10-12 mins    Meds:     Epidural : .yes  Magnesium sulfate: .no  Pitocin: .Just restarted at 2 mu after being off x 5 hours    Lab:   Recent Results (from the past 24 hour(s))   Ferning if suspected rupture of membranes (ROM)    Collection Time: 01/03/24 11:00 PM   Result Value Ref Range    Fern Test On Amniotic Fluid see below Not present   PROTEIN/CREAT RATIO URINE    Collection Time: 01/03/24 11:00 PM   Result Value Ref Range    Total Protein, Urine 25.0 (H) 0.0 - 15.0 mg/dL    Creatinine, Random Urine 110.17 mg/dL    Protein Creatinine Ratio 227 (H) 10 - 107 mg/g   URINALYSIS    Collection Time: 01/03/24 11:15 PM    Specimen: Urine, Clean Catch   Result Value Ref Range    Color Yellow     Character Clear     Specific Gravity 1.027 <1.035    Ph 6.0 5.0 - 8.0    Glucose Negative Negative mg/dL    Ketones Negative Negative mg/dL    Protein Negative Negative mg/dL    Bilirubin Negative Negative    Urobilinogen, Urine 0.2 Negative    Nitrite Negative Negative    Leukocyte Esterase Negative Negative    Occult Blood Negative Negative    Micro Urine Req see below    POCT urinalysis device results    Collection Time: 01/03/24 11:16 PM   Result Value Ref Range    POC Color Su     POC Appearance Clear     POC Glucose Negative Negative mg/dL    POC Ketones Negative Negative mg/dL    POC Specific Gravity >=1.030 (A) 1.005 - 1.030    POC Blood Negative Negative    POC Urine PH 6.0 5.0 - 8.0    POC Protein 30 (A) Negative mg/dL    POC Nitrites Negative Negative "    POC Leukocyte Esterase Negative Negative   CBC WITH DIFFERENTIAL    Collection Time: 01/04/24 12:00 AM   Result Value Ref Range    WBC 11.9 (H) 4.8 - 10.8 K/uL    RBC 4.38 4.20 - 5.40 M/uL    Hemoglobin 11.5 (L) 12.0 - 16.0 g/dL    Hematocrit 35.7 (L) 37.0 - 47.0 %    MCV 81.5 81.4 - 97.8 fL    MCH 26.3 (L) 27.0 - 33.0 pg    MCHC 32.2 32.2 - 35.5 g/dL    RDW 46.3 35.9 - 50.0 fL    Platelet Count 405 164 - 446 K/uL    MPV 10.0 9.0 - 12.9 fL    Neutrophils-Polys 71.10 44.00 - 72.00 %    Lymphocytes 21.80 (L) 22.00 - 41.00 %    Monocytes 5.70 0.00 - 13.40 %    Eosinophils 0.30 0.00 - 6.90 %    Basophils 0.30 0.00 - 1.80 %    Immature Granulocytes 0.80 0.00 - 0.90 %    Nucleated RBC 0.00 0.00 - 0.20 /100 WBC    Neutrophils (Absolute) 8.49 (H) 1.82 - 7.42 K/uL    Lymphs (Absolute) 2.60 1.00 - 4.80 K/uL    Monos (Absolute) 0.68 0.00 - 0.85 K/uL    Eos (Absolute) 0.03 0.00 - 0.51 K/uL    Baso (Absolute) 0.03 0.00 - 0.12 K/uL    Immature Granulocytes (abs) 0.09 0.00 - 0.11 K/uL    NRBC (Absolute) 0.00 K/uL   Comp Metabolic Panel    Collection Time: 01/04/24 12:00 AM   Result Value Ref Range    Sodium 138 135 - 145 mmol/L    Potassium 3.9 3.6 - 5.5 mmol/L    Chloride 105 96 - 112 mmol/L    Co2 20 20 - 33 mmol/L    Anion Gap 13.0 7.0 - 16.0    Glucose 93 65 - 99 mg/dL    Bun 13 8 - 22 mg/dL    Creatinine 0.45 (L) 0.50 - 1.40 mg/dL    Calcium 9.2 8.5 - 10.5 mg/dL    Correct Calcium 9.7 8.5 - 10.5 mg/dL    AST(SGOT) 23 12 - 45 U/L    ALT(SGPT) 26 2 - 50 U/L    Alkaline Phosphatase 147 (H) 30 - 99 U/L    Total Bilirubin 0.2 0.1 - 1.5 mg/dL    Albumin 3.4 3.2 - 4.9 g/dL    Total Protein 6.8 6.0 - 8.2 g/dL    Globulin 3.4 1.9 - 3.5 g/dL    A-G Ratio 1.0 g/dL   URIC ACID    Collection Time: 01/04/24 12:00 AM   Result Value Ref Range    Uric Acid 4.1 1.9 - 8.2 mg/dL   T.PALLIDUM AB CRISTIANE (Syphilis)    Collection Time: 01/04/24 12:00 AM   Result Value Ref Range    Syphilis, Treponemal Qual Non-Reactive Non-Reactive   [facility] Blood [facility]  Specimen (Not Tested)    Collection Time: 24 12:00 AM   Result Value Ref Range    Holding Tube - Bb DONE    ESTIMATED GFR    Collection Time: 24 12:00 AM   Result Value Ref Range    GFR (CKD-EPI) 132 >60 mL/min/1.73 m 2       A/P  Shelby Calderon is 28 y.o.  at 39w6d  Restart Pitocin  IUPC and FSE placed

## 2024-01-05 NOTE — PROGRESS NOTES
190 Report received from Lis GARSIA.  Patient denies headache, vision changes and RUQ pain.  [id number] Orders received from Dr. Meehan to give Nifedipine. Patient vomited medication. Medication re-administered by this RN. Patient tolerated well.    SVE, see flowsheets   Orderes received from Dr. Meehan for D5 LR.  2100 Orders received from Dr. Meehan.   [id number] Dr. Meehan at bedside. SVE, see flowsheets. Decision for primary  section. All Patient questions answered.  0010 Patient prepped for primary  section.  0038 Delivery of viable baby boy, Jason.   0039 Delivery of placenta.   0330 Fundus firm. Lochia scant. Patient assisted up to restroom with steady gait. Void x1. Pericare done. Gown changed.   0402 Patient transferred to postpartum in stable condition. Bedside report given to Deepika GARSIA. Bands confirmed.

## 2024-01-05 NOTE — LACTATION NOTE
This note was copied from a baby's chart.  Mom is a 27 y/o P1 who delivered baby boy weighing 7 # 15 oz at 40 wks. Mom reports breast changes during pregnancy. Mom denies any breast surgeries, diabetes, thyroid or fertility issues.  Mom reports baby fed in PACU and when she arrived to the PP floor.   LC reviewed demand feeds of 8 or more times in 24 hours and encouraged STS. LC taught feeding cues to parents and encouraged to call for assist as needed. LC reviewed avoiding pacifiers, bottles and pumps for 3-4 wks, unless medically indicated  Mom has a pump at home, has [employer] insurance and is not enrolled in [facility]

## 2024-01-05 NOTE — CARE PLAN
The patient is Watcher - Medium risk of patient condition declining or worsening    Shift Goals  Clinical Goals: Maintain BP's WDL  Patient Goals: Healthy mom and healthy baby  Family Goals: Support    Progress made toward(s) clinical / shift goals:    Problem: Knowledge Deficit - L&D  Goal: Patient and family/caregivers will demonstrate understanding of plan of care, disease process/condition, diagnostic tests and medications  Outcome: Progressing     Problem: Psychosocial - L&D  Goal: Patient's level of anxiety will decrease  Outcome: Progressing  Goal: Patient will be able to discuss coping skills during hospitalization  Outcome: Progressing     Problem: Pain  Goal: Patient's pain will be alleviated or reduced to the patient’s comfort goal  Outcome: Progressing

## 2024-01-05 NOTE — PROGRESS NOTES
Pt arrived to room 324 from L&D PACU via gurney transport. Transferred to bed using hovermat, pt tolerated well. Bedside report received from L&D RN Suzanne. Lochia light without clots expressed, fundus firm midline, postpartum bag pitocin infusing at 125ml/hr as per MAR. SCD's in place bilaterally, continuous pulse oximeter in place, IS at bedside and education given. Pt and SO oriented to room and unit, pain management options, jethro care and pad changes, updated on plan of care and safety precautions. Questions answered and pt verbalizes understanding. Siderails up x2, bed level down, call light within reach, no further questions or concerns at this time.

## 2024-01-05 NOTE — PROGRESS NOTES
Telephone order received from Dr. Meehan that all medications may be started now as pt did not have duramorph and does not have duramorph protocol orders.

## 2024-01-06 PROCEDURE — 700102 HCHG RX REV CODE 250 W/ 637 OVERRIDE(OP): Performed by: OBSTETRICS & GYNECOLOGY

## 2024-01-06 PROCEDURE — 770002 HCHG ROOM/CARE - OB PRIVATE (112)

## 2024-01-06 PROCEDURE — A9270 NON-COVERED ITEM OR SERVICE: HCPCS | Performed by: OBSTETRICS & GYNECOLOGY

## 2024-01-06 RX ORDER — FERROUS SULFATE 325(65) MG
325 TABLET ORAL 2 TIMES DAILY WITH MEALS
Status: DISCONTINUED | OUTPATIENT
Start: 2024-01-06 | End: 2024-01-07 | Stop reason: HOSPADM

## 2024-01-06 RX ADMIN — FERROUS SULFATE TAB 325 MG (65 MG ELEMENTAL FE) 325 MG: 325 (65 FE) TAB at 14:00

## 2024-01-06 RX ADMIN — DOCUSATE SODIUM 100 MG: 100 CAPSULE, LIQUID FILLED ORAL at 18:11

## 2024-01-06 RX ADMIN — ACETAMINOPHEN 1000 MG: 500 TABLET ORAL at 02:45

## 2024-01-06 RX ADMIN — DOCUSATE SODIUM 100 MG: 100 CAPSULE, LIQUID FILLED ORAL at 06:29

## 2024-01-06 RX ADMIN — OXYCODONE HYDROCHLORIDE 5 MG: 5 TABLET ORAL at 22:10

## 2024-01-06 RX ADMIN — IBUPROFEN 800 MG: 800 TABLET, FILM COATED ORAL at 14:00

## 2024-01-06 RX ADMIN — FERROUS SULFATE TAB 325 MG (65 MG ELEMENTAL FE) 325 MG: 325 (65 FE) TAB at 20:29

## 2024-01-06 RX ADMIN — ACETAMINOPHEN 1000 MG: 500 TABLET ORAL at 20:29

## 2024-01-06 RX ADMIN — IBUPROFEN 800 MG: 800 TABLET, FILM COATED ORAL at 22:11

## 2024-01-06 RX ADMIN — LABETALOL HYDROCHLORIDE 200 MG: 100 TABLET, FILM COATED ORAL at 06:29

## 2024-01-06 RX ADMIN — OXYCODONE HYDROCHLORIDE 10 MG: 10 TABLET ORAL at 06:48

## 2024-01-06 RX ADMIN — ACETAMINOPHEN 1000 MG: 500 TABLET ORAL at 14:00

## 2024-01-06 RX ADMIN — IBUPROFEN 800 MG: 800 TABLET, FILM COATED ORAL at 06:29

## 2024-01-06 RX ADMIN — ACETAMINOPHEN 1000 MG: 500 TABLET ORAL at 08:24

## 2024-01-06 RX ADMIN — PRENATAL WITH FERROUS FUM AND FOLIC ACID 1 TABLET: 3080; 920; 120; 400; 22; 1.84; 3; 20; 10; 1; 12; 200; 27; 25; 2 TABLET ORAL at 08:23

## 2024-01-06 RX ADMIN — LABETALOL HYDROCHLORIDE 200 MG: 100 TABLET, FILM COATED ORAL at 18:11

## 2024-01-06 ASSESSMENT — PAIN DESCRIPTION - PAIN TYPE
TYPE: ACUTE PAIN;SURGICAL PAIN

## 2024-01-06 NOTE — CARE PLAN
The patient is Stable - Low risk of patient condition declining or worsening    Shift Goals  Clinical Goals: To have adequate pain control; ambulate  Patient Goals: pain control  Family Goals: Support    Progress made toward(s) clinical / shift goals:      Problem: Pain - Standard  Goal: Alleviation of pain or a reduction in pain to the patient’s comfort goal  Outcome: Progressing  Note: Pain is well controlled at this time.       Problem: Altered Physiologic Condition  Goal: Patient physiologically stable as evidenced by normal lochia, palpable uterine involution and vitals within normal limits  Outcome: Progressing     Problem: Early Mobilization - Post Surgery  Goal: Early mobilization post surgery  Outcome: Progressing  Note: She was able to walk in the hallway. She did well.       Patient is not progressing towards the following goals:

## 2024-01-06 NOTE — PROGRESS NOTES
"Shelby Calderon PP day 1 POD 1    Subjective: Abdominal pain. mild, ambulating .yes, tolerating liquids .yes, tolerating regular diet .yes, flatus.yes, BM .no, Bleeding .minimal, voiding .yes,dizziness .no, breast feeding.yes, breast tenderness .no    /57   Pulse 80   Temp 36.1 °C (97 °F) (Temporal)   Resp 18   Ht 1.626 m (5' 4\")   Wt (!) 150 kg (330 lb)   SpO2 97%   Breast Exam: Tenderness .no, Engourgement .no, Mastitis .no  Abdomen soft, non-tender. BS normal. No masses,  No organomegaly, obese  Incision: dry and intact  Fundus Tenderness:no, Below umbilicus:Yes  Perineumperineum intact  ExtremitiesNormal    Meds:   No current facility-administered medications on file prior to encounter.     Current Outpatient Medications on File Prior to Encounter   Medication Sig Dispense Refill    Prenatal Multivit-Min-Fe-FA (PRE- PO) Take  by mouth.      aspirin (ASA) 81 MG Chew Tab chewable tablet Chew 81 mg every day.         Lab:   Recent Results (from the past 48 hour(s))   PROTEIN/CREAT RATIO URINE    Collection Time: 24  9:33 PM   Result Value Ref Range    Total Protein, Urine 58.0 (H) 0.0 - 15.0 mg/dL    Creatinine, Random Urine 163.03 mg/dL    Protein Creatinine Ratio 356 (H) 10 - 107 mg/g   CBC WITH DIFFERENTIAL    Collection Time: 24  9:34 PM   Result Value Ref Range    WBC 14.3 (H) 4.8 - 10.8 K/uL    RBC 4.23 4.20 - 5.40 M/uL    Hemoglobin 11.4 (L) 12.0 - 16.0 g/dL    Hematocrit 34.3 (L) 37.0 - 47.0 %    MCV 81.1 (L) 81.4 - 97.8 fL    MCH 27.0 27.0 - 33.0 pg    MCHC 33.2 32.2 - 35.5 g/dL    RDW 46.0 35.9 - 50.0 fL    Platelet Count 385 164 - 446 K/uL    MPV 10.2 9.0 - 12.9 fL    Neutrophils-Polys 86.00 (H) 44.00 - 72.00 %    Lymphocytes 8.80 (L) 22.00 - 41.00 %    Monocytes 4.20 0.00 - 13.40 %    Eosinophils 0.10 0.00 - 6.90 %    Basophils 0.10 0.00 - 1.80 %    Immature Granulocytes 0.80 0.00 - 0.90 %    Nucleated RBC 0.00 0.00 - 0.20 /100 WBC    Neutrophils (Absolute) 12.24 (H) 1.82 - " 7.42 K/uL    Lymphs (Absolute) 1.26 1.00 - 4.80 K/uL    Monos (Absolute) 0.60 0.00 - 0.85 K/uL    Eos (Absolute) 0.01 0.00 - 0.51 K/uL    Baso (Absolute) 0.02 0.00 - 0.12 K/uL    Immature Granulocytes (abs) 0.12 (H) 0.00 - 0.11 K/uL    NRBC (Absolute) 0.00 K/uL   Comp Metabolic Panel    Collection Time: 01/04/24  9:34 PM   Result Value Ref Range    Sodium 135 135 - 145 mmol/L    Potassium 3.5 (L) 3.6 - 5.5 mmol/L    Chloride 102 96 - 112 mmol/L    Co2 20 20 - 33 mmol/L    Anion Gap 13.0 7.0 - 16.0    Glucose 107 (H) 65 - 99 mg/dL    Bun 8 8 - 22 mg/dL    Creatinine 0.40 (L) 0.50 - 1.40 mg/dL    Calcium 8.7 8.5 - 10.5 mg/dL    Correct Calcium 9.4 8.5 - 10.5 mg/dL    AST(SGOT) 35 12 - 45 U/L    ALT(SGPT) 36 2 - 50 U/L    Alkaline Phosphatase 142 (H) 30 - 99 U/L    Total Bilirubin 0.4 0.1 - 1.5 mg/dL    Albumin 3.1 (L) 3.2 - 4.9 g/dL    Total Protein 6.6 6.0 - 8.2 g/dL    Globulin 3.5 1.9 - 3.5 g/dL    A-G Ratio 0.9 g/dL   ESTIMATED GFR    Collection Time: 01/04/24  9:34 PM   Result Value Ref Range    GFR (CKD-EPI) 136 >60 mL/min/1.73 m 2   CBC without differential- Once in AM regardless of delivery time    Collection Time: 01/05/24 10:17 AM   Result Value Ref Range    WBC 14.6 (H) 4.8 - 10.8 K/uL    RBC 3.35 (L) 4.20 - 5.40 M/uL    Hemoglobin 8.9 (L) 12.0 - 16.0 g/dL    Hematocrit 27.5 (L) 37.0 - 47.0 %    MCV 82.1 81.4 - 97.8 fL    MCH 26.6 (L) 27.0 - 33.0 pg    MCHC 32.4 32.2 - 35.5 g/dL    RDW 47.3 35.9 - 50.0 fL    Platelet Count 328 164 - 446 K/uL    MPV 10.3 9.0 - 12.9 fL       Assessment and Plan  normal postpartum course  No heavy bleeding or foul vaginal discharge     Continue Routine postpartum care  Start iron  Ambulate

## 2024-01-06 NOTE — CARE PLAN
Problem: Pain - Standard  Goal: Alleviation of pain or a reduction in pain to the patient’s comfort goal  Outcome: Progressing     Problem: Altered Physiologic Condition  Goal: Patient physiologically stable as evidenced by normal lochia, palpable uterine involution and vitals within normal limits  Outcome: Progressing     Problem: Infection - Postpartum  Goal: Postpartum patient will be free of signs and symptoms of infection  Outcome: Progressing     The patient is Watcher - Medium risk of patient condition declining or worsening    Shift Goals  Clinical Goals: Pain control, lochia WDL, no s/s infection  Patient Goals: Healthy mom and healthy baby  Family Goals: Support    Progress made toward(s) clinical / shift goals:  Pain well controlled with current medications as per MAR, pt is able to ambulate and care for self and infant with assistance. Lochia remains light without clots expressed, performing jethro care and pad changes independently. No s/s infection noted on assessment, remains afebrile.    Patient is not progressing towards the following goals: NA

## 2024-01-06 NOTE — PROGRESS NOTES
Bedside report received from dayshift RN. 12hr chart check complete, MAR and orders reviewed. Pt awake and alert with family at bedside for support, call light within reach.

## 2024-01-06 NOTE — PROGRESS NOTES
Report received from Deepika GARSIA this am. Assumed care. The patient is awake and alert. Discussed plan of care. Assessment done. Medicated for pain. Encouraged ambulation at least 3x a day. Call light is within reach. Encouraged to call for assistance. Will check at intervals.

## 2024-01-06 NOTE — LACTATION NOTE
This note was copied from a baby's chart.  Follow up lactation support : Mom reports feedings went well last night and baby did cluster feed. Mom denies any discomfort. Bedside RN was burping baby due to being gassy and fussy. LC noted feeding cues and offered assistance to the breast. Baby fed about 1.5 hrs ago.   Baby latched well in FB hold on left side and mother was sitting on the couch. Mom prefers this positioning and states it is more comfortable and   She feels like she has more control of baby  Mom has no concerns at this time and will be possibly discharging tomorrow.  LC encouraged STS and reviewed demand feeds..   13.38

## 2024-01-07 ENCOUNTER — PHARMACY VISIT (OUTPATIENT)
Dept: PHARMACY | Facility: MEDICAL CENTER | Age: 29
End: 2024-01-07
Payer: COMMERCIAL

## 2024-01-07 VITALS
HEIGHT: 64 IN | DIASTOLIC BLOOD PRESSURE: 81 MMHG | SYSTOLIC BLOOD PRESSURE: 141 MMHG | RESPIRATION RATE: 18 BRPM | HEART RATE: 85 BPM | OXYGEN SATURATION: 100 % | TEMPERATURE: 97.6 F | WEIGHT: 293 LBS | BODY MASS INDEX: 50.02 KG/M2

## 2024-01-07 PROCEDURE — RXMED WILLOW AMBULATORY MEDICATION CHARGE: Performed by: OBSTETRICS & GYNECOLOGY

## 2024-01-07 PROCEDURE — 700102 HCHG RX REV CODE 250 W/ 637 OVERRIDE(OP): Performed by: OBSTETRICS & GYNECOLOGY

## 2024-01-07 PROCEDURE — A9270 NON-COVERED ITEM OR SERVICE: HCPCS | Performed by: OBSTETRICS & GYNECOLOGY

## 2024-01-07 RX ORDER — OXYCODONE HYDROCHLORIDE AND ACETAMINOPHEN 5; 325 MG/1; MG/1
1 TABLET ORAL EVERY 4 HOURS PRN
Qty: 30 TABLET | Refills: 0 | Status: SHIPPED | OUTPATIENT
Start: 2024-01-07 | End: 2024-01-14

## 2024-01-07 RX ORDER — PSEUDOEPHEDRINE HCL 30 MG
100 TABLET ORAL 2 TIMES DAILY PRN
Qty: 60 CAPSULE | Refills: 1 | Status: SHIPPED | OUTPATIENT
Start: 2024-01-07

## 2024-01-07 RX ORDER — IBUPROFEN 800 MG/1
800 TABLET ORAL EVERY 8 HOURS
Qty: 30 TABLET | Refills: 1 | Status: SHIPPED | OUTPATIENT
Start: 2024-01-07

## 2024-01-07 RX ADMIN — PRENATAL WITH FERROUS FUM AND FOLIC ACID 1 TABLET: 3080; 920; 120; 400; 22; 1.84; 3; 20; 10; 1; 12; 200; 27; 25; 2 TABLET ORAL at 09:33

## 2024-01-07 RX ADMIN — FERROUS SULFATE TAB 325 MG (65 MG ELEMENTAL FE) 325 MG: 325 (65 FE) TAB at 09:33

## 2024-01-07 RX ADMIN — IBUPROFEN 800 MG: 800 TABLET, FILM COATED ORAL at 06:26

## 2024-01-07 RX ADMIN — ACETAMINOPHEN 1000 MG: 500 TABLET ORAL at 09:33

## 2024-01-07 RX ADMIN — ACETAMINOPHEN 1000 MG: 500 TABLET ORAL at 03:02

## 2024-01-07 RX ADMIN — LABETALOL HYDROCHLORIDE 200 MG: 100 TABLET, FILM COATED ORAL at 06:26

## 2024-01-07 ASSESSMENT — PAIN DESCRIPTION - PAIN TYPE: TYPE: ACUTE PAIN;SURGICAL PAIN

## 2024-01-07 NOTE — CARE PLAN
Problem: Pain - Standard  Goal: Alleviation of pain or a reduction in pain to the patient’s comfort goal  Outcome: Progressing     Problem: Infection - Postpartum  Goal: Postpartum patient will be free of signs and symptoms of infection  Outcome: Progressing   The patient is Stable - Low risk of patient condition declining or worsening    Shift Goals  Clinical Goals: VSS  Patient Goals: pain management  Family Goals: rest, bonding    Progress made toward(s) clinical / shift goals:  Pt vitals are within defined parameters. Pt is not exhibiting signs of infection at this time. Pt educated on pain scale and pain medication. Pain medication being administered as ordered.

## 2024-01-07 NOTE — PROGRESS NOTES
1855 Obtained report from day shift nurse Stefanie  2015 Pt assessment completed. No abnormal findings noted. All pt needs and questions addressed at this time.

## 2024-01-07 NOTE — PROGRESS NOTES
Assumed care. Assessment complete. VSS. Fundus firm, lochia light. Pt ambulating and voiding without difficulty. Will continue to monitor.

## 2024-01-07 NOTE — DISCHARGE INSTRUCTIONS

## 2024-01-07 NOTE — LACTATION NOTE
Shelby is a 28 year old  who delivered via C/S on  at 0038 for [facility]. Shelby's prenatal history includes PIH and Pre-eclampsia. Baby boy Jason was 40 weeks and his birth weight was 3600 grams/7 lb. 15 ounces. His current weight is 3330 grams/ 7 lbs. 5.5 ounces, (-7.5%).    Jason has had several urine and meconium diapers. Shelby's breasts are symmetrical with everted, intact nipples. We reviewed using her own colostrum/milk for nipple care, topping with nipple cream as needed for colostrum.    Parents state that Jason fed frequently during the night and has just finished a breastfeeding 10 minute session. Parents deny any questions or concerns at this time.     Resource list given and reviewed and barcode explained. Assistance offered at next feeding prn.

## 2024-01-07 NOTE — DISCHARGE SUMMARY
Discharge Summary:      Shelby Calderon    Admit Date:   1/3/2024  Discharge Date:  2024     Admitting diagnosis:  Indication for care in labor or delivery [O75.9]  Discharge Diagnosis: Status post  for failure to progress.  Pregnancy Complications: none  Tubal Ligation:  no     History:  History reviewed. No pertinent past medical history.  OB History    Para Term  AB Living   1 1 1     1   SAB IAB Ectopic Molar Multiple Live Births           0 1      # Outcome Date GA Lbr Johan/2nd Weight Sex Delivery Anes PTL Lv   1 Term 24 40w0d  3.6 kg (7 lb 15 oz) M CS-LTranv EPI N ALLYSON      Complications: Failure to Progress in Second Stage     Patient has no known allergies.    Patient Active Problem List    Diagnosis Date Noted    Indication for care in labor or delivery 2024     Hospital Course:   28 y.o. , now para 1, was admitted with the above mentioned diagnosis, underwent Induction of Labor,  for failure to progress. Patient postpartum course was unremarkable, with progressive advancement in diet , ambulation and toleration of oral analgesia. Patient without complaints today and desires discharge.      Vitals:    24 2210 24 0200 24 0600 24 0944   BP: (!) 147/62 138/60 137/55 136/55   Pulse: 80 64 72 80   Resp: 18 18 18 19   Temp: 36.7 °C (98 °F) 36.5 °C (97.7 °F) 36.4 °C (97.5 °F) 36.6 °C (97.9 °F)   TempSrc: Temporal Temporal Temporal Temporal   SpO2: 99% 97% 99% 99%   Weight:       Height:           Exam:  Breast Exam: negative  Abdomen: Abdomen soft, non-tender. BS normal. No masses,  No organomegaly  Fundus Non Tender: yes  Incision: dry and intact  Perineum: perineum intact  Extremity: extremities, peripheral pulses and reflexes normal     Labs:  Recent Labs     24  2134 24  1017   WBC 14.3* 14.6*   RBC 4.23 3.35*   HEMOGLOBIN 11.4* 8.9*   HEMATOCRIT 34.3* 27.5*   MCV 81.1* 82.1   MCH 27.0 26.6*   MCHC 33.2 32.4   RDW 46.0  47.3   PLATELETCT 385 328   MPV 10.2 10.3        Activity:   Discharge to home  Pelvic Rest x 6 weeks    Assessment:  normal postpartum course  Discharge Assessment: No heavy bleeding or foul vaginal discharge      Follow up: 2 week for incision check.      Discharge Meds:   Current Outpatient Medications   Medication Sig Dispense Refill    ibuprofen (MOTRIN) 800 MG Tab Take 1 Tablet by mouth every 8 hours. 30 Tablet 1    docusate sodium 100 MG Cap Take 100 mg by mouth 2 times a day as needed for Constipation. 60 Capsule 1    oxyCODONE-acetaminophen (PERCOCET) 5-325 MG Tab Take 1 Tablet by mouth every four hours as needed for Moderate Pain or Severe Pain for up to 7 days. 30 Tablet 0       Araseli Meehan M.D.

## 2024-06-29 ENCOUNTER — HOSPITAL ENCOUNTER (OUTPATIENT)
Dept: LAB | Facility: MEDICAL CENTER | Age: 29
End: 2024-06-29
Attending: OBSTETRICS & GYNECOLOGY
Payer: COMMERCIAL

## 2024-06-29 LAB
ABO GROUP BLD: NORMAL
BASOPHILS # BLD AUTO: 0.3 % (ref 0–1.8)
BASOPHILS # BLD: 0.03 K/UL (ref 0–0.12)
BLD GP AB SCN SERPL QL: NORMAL
EOSINOPHIL # BLD AUTO: 0.04 K/UL (ref 0–0.51)
EOSINOPHIL NFR BLD: 0.4 % (ref 0–6.9)
ERYTHROCYTE [DISTWIDTH] IN BLOOD BY AUTOMATED COUNT: 49.5 FL (ref 35.9–50)
EST. AVERAGE GLUCOSE BLD GHB EST-MCNC: 103 MG/DL
GLUCOSE 1H P 50 G GLC PO SERPL-MCNC: 109 MG/DL (ref 70–139)
HBA1C MFR BLD: 5.2 % (ref 4–5.6)
HBV SURFACE AG SER QL: NORMAL
HCT VFR BLD AUTO: 39.7 % (ref 37–47)
HCV AB SER QL: NORMAL
HGB BLD-MCNC: 12.4 G/DL (ref 12–16)
HIV 1+2 AB+HIV1 P24 AG SERPL QL IA: NORMAL
IMM GRANULOCYTES # BLD AUTO: 0.04 K/UL (ref 0–0.11)
IMM GRANULOCYTES NFR BLD AUTO: 0.4 % (ref 0–0.9)
LYMPHOCYTES # BLD AUTO: 2.02 K/UL (ref 1–4.8)
LYMPHOCYTES NFR BLD: 22.2 % (ref 22–41)
MCH RBC QN AUTO: 24.9 PG (ref 27–33)
MCHC RBC AUTO-ENTMCNC: 31.2 G/DL (ref 32.2–35.5)
MCV RBC AUTO: 79.9 FL (ref 81.4–97.8)
MONOCYTES # BLD AUTO: 0.49 K/UL (ref 0–0.85)
MONOCYTES NFR BLD AUTO: 5.4 % (ref 0–13.4)
NEUTROPHILS # BLD AUTO: 6.49 K/UL (ref 1.82–7.42)
NEUTROPHILS NFR BLD: 71.3 % (ref 44–72)
NRBC # BLD AUTO: 0 K/UL
NRBC BLD-RTO: 0 /100 WBC (ref 0–0.2)
PLATELET # BLD AUTO: 401 K/UL (ref 164–446)
PMV BLD AUTO: 10 FL (ref 9–12.9)
RBC # BLD AUTO: 4.97 M/UL (ref 4.2–5.4)
RH BLD: NORMAL
RUBV AB SER QL: 275 IU/ML
T PALLIDUM AB SER QL IA: NORMAL
TSH SERPL DL<=0.005 MIU/L-ACNC: 0.71 UIU/ML (ref 0.38–5.33)
WBC # BLD AUTO: 9.1 K/UL (ref 4.8–10.8)

## 2024-06-29 PROCEDURE — 85025 COMPLETE CBC W/AUTO DIFF WBC: CPT

## 2024-06-29 PROCEDURE — 86803 HEPATITIS C AB TEST: CPT

## 2024-06-29 PROCEDURE — 86762 RUBELLA ANTIBODY: CPT

## 2024-06-29 PROCEDURE — 86850 RBC ANTIBODY SCREEN: CPT

## 2024-06-29 PROCEDURE — 86900 BLOOD TYPING SEROLOGIC ABO: CPT

## 2024-06-29 PROCEDURE — 86780 TREPONEMA PALLIDUM: CPT

## 2024-06-29 PROCEDURE — 83036 HEMOGLOBIN GLYCOSYLATED A1C: CPT

## 2024-06-29 PROCEDURE — 87340 HEPATITIS B SURFACE AG IA: CPT

## 2024-06-29 PROCEDURE — 84443 ASSAY THYROID STIM HORMONE: CPT

## 2024-06-29 PROCEDURE — 87389 HIV-1 AG W/HIV-1&-2 AB AG IA: CPT

## 2024-06-29 PROCEDURE — 86901 BLOOD TYPING SEROLOGIC RH(D): CPT

## 2024-06-29 PROCEDURE — 82950 GLUCOSE TEST: CPT

## 2024-06-29 PROCEDURE — 36415 COLL VENOUS BLD VENIPUNCTURE: CPT

## 2024-07-10 ENCOUNTER — ANCILLARY PROCEDURE (OUTPATIENT)
Dept: MATERNAL FETAL MEDICINE | Facility: MEDICAL CENTER | Age: 29
End: 2024-07-10
Attending: OBSTETRICS & GYNECOLOGY
Payer: COMMERCIAL

## 2024-07-10 VITALS
SYSTOLIC BLOOD PRESSURE: 158 MMHG | DIASTOLIC BLOOD PRESSURE: 91 MMHG | WEIGHT: 293 LBS | BODY MASS INDEX: 54.05 KG/M2 | HEART RATE: 80 BPM

## 2024-07-10 PROCEDURE — 76813 OB US NUCHAL MEAS 1 GEST: CPT | Performed by: OBSTETRICS & GYNECOLOGY

## 2024-07-10 PROCEDURE — 76801 OB US < 14 WKS SINGLE FETUS: CPT | Performed by: OBSTETRICS & GYNECOLOGY

## 2024-07-10 ASSESSMENT — FIBROSIS 4 INDEX: FIB4 SCORE: 0.45

## 2024-07-29 ENCOUNTER — HOSPITAL ENCOUNTER (OUTPATIENT)
Dept: LAB | Facility: MEDICAL CENTER | Age: 29
End: 2024-07-29
Attending: OBSTETRICS & GYNECOLOGY
Payer: COMMERCIAL

## 2024-08-03 ENCOUNTER — HOSPITAL ENCOUNTER (OUTPATIENT)
Dept: LAB | Facility: MEDICAL CENTER | Age: 29
End: 2024-08-03
Attending: OBSTETRICS & GYNECOLOGY
Payer: COMMERCIAL

## 2024-08-03 PROCEDURE — 81329 SMN1 GENE DOS/DELETION ALYS: CPT

## 2024-08-03 PROCEDURE — 36415 COLL VENOUS BLD VENIPUNCTURE: CPT

## 2024-08-03 PROCEDURE — 81220 CFTR GENE COM VARIANTS: CPT

## 2024-08-09 LAB
GEN STRUCT VAR COPY NUM: NORMAL
SMN1 GENE MUT ANL BLD/T: NORMAL
SMN1+SMN2 GENE MUT ANL BLD/T: NORMAL
SMN2 GENE MUT ANL BLD/T: NORMAL
SPECIMEN SOURCE: NORMAL
SYMPTOM: NORMAL

## 2024-08-11 LAB
CF EXPANDED VARIANT PANEL INTERP Q4864: NORMAL
CFTR ALLELE 1 BLD/T QL: NEGATIVE
CFTR ALLELE 1 BLD/T QL: NEGATIVE
CFTR MUT ANL BLD/T: NORMAL

## 2024-08-22 ENCOUNTER — TELEPHONE (OUTPATIENT)
Dept: MATERNAL FETAL MEDICINE | Facility: MEDICAL CENTER | Age: 29
End: 2024-08-22
Payer: COMMERCIAL

## 2024-08-22 NOTE — TELEPHONE ENCOUNTER
Reviewed patient's [id number] and carrier screening results with Dr. Jansen. All labs came back low risk - waiting to confirm with pt if she wants to know gender. LVM for pt to call back to relay low risk results

## 2024-09-04 ENCOUNTER — APPOINTMENT (OUTPATIENT)
Dept: MATERNAL FETAL MEDICINE | Facility: MEDICAL CENTER | Age: 29
End: 2024-09-04
Attending: OBSTETRICS & GYNECOLOGY
Payer: COMMERCIAL

## 2024-09-04 VITALS
BODY MASS INDEX: 55.08 KG/M2 | DIASTOLIC BLOOD PRESSURE: 57 MMHG | SYSTOLIC BLOOD PRESSURE: 101 MMHG | HEART RATE: 82 BPM | WEIGHT: 293 LBS

## 2024-09-04 DIAGNOSIS — E66.01 SEVERE OBESITY DUE TO EXCESS CALORIES AFFECTING PREGNANCY IN SECOND TRIMESTER (HCC): ICD-10-CM

## 2024-09-04 DIAGNOSIS — Z3A.20 20 WEEKS GESTATION OF PREGNANCY: ICD-10-CM

## 2024-09-04 DIAGNOSIS — O99.212 SEVERE OBESITY DUE TO EXCESS CALORIES AFFECTING PREGNANCY IN SECOND TRIMESTER (HCC): ICD-10-CM

## 2024-09-04 PROCEDURE — 76811 OB US DETAILED SNGL FETUS: CPT | Performed by: OBSTETRICS & GYNECOLOGY

## 2024-09-04 PROCEDURE — 76817 TRANSVAGINAL US OBSTETRIC: CPT | Performed by: OBSTETRICS & GYNECOLOGY

## 2024-09-04 ASSESSMENT — FIBROSIS 4 INDEX: FIB4 SCORE: 0.45

## 2024-09-10 ENCOUNTER — HOSPITAL ENCOUNTER (OUTPATIENT)
Dept: LAB | Facility: MEDICAL CENTER | Age: 29
End: 2024-09-10
Attending: OBSTETRICS & GYNECOLOGY
Payer: COMMERCIAL

## 2024-09-10 PROCEDURE — 82105 ALPHA-FETOPROTEIN SERUM: CPT

## 2024-09-10 PROCEDURE — 36415 COLL VENOUS BLD VENIPUNCTURE: CPT

## 2024-09-13 LAB
# FETUSES US: NORMAL
AFP MOM SERPL: 0.84
AFP SERPL-MCNC: 37 NG/ML
AGE - REPORTED: 32 YR
CURRENT SMOKER: NO
FAMILY MEMBER DISEASES HX: NO
GA METHOD: NORMAL
GA: NORMAL WK
IDDM PATIENT QL: NO
INTEGRATED SCN PATIENT-IMP: NORMAL
SPECIMEN DRAWN SERPL: NORMAL

## 2024-10-02 ENCOUNTER — ANCILLARY PROCEDURE (OUTPATIENT)
Dept: MATERNAL FETAL MEDICINE | Facility: MEDICAL CENTER | Age: 29
End: 2024-10-02
Payer: COMMERCIAL

## 2024-10-02 VITALS — DIASTOLIC BLOOD PRESSURE: 86 MMHG | BODY MASS INDEX: 55.55 KG/M2 | WEIGHT: 293 LBS | SYSTOLIC BLOOD PRESSURE: 124 MMHG

## 2024-10-02 DIAGNOSIS — Z3A.24 24 WEEKS GESTATION OF PREGNANCY: ICD-10-CM

## 2024-10-02 PROCEDURE — 76815 OB US LIMITED FETUS(S): CPT | Performed by: OBSTETRICS & GYNECOLOGY

## 2024-10-02 ASSESSMENT — FIBROSIS 4 INDEX: FIB4 SCORE: 0.45

## 2024-10-19 ENCOUNTER — HOSPITAL ENCOUNTER (OUTPATIENT)
Dept: LAB | Facility: MEDICAL CENTER | Age: 29
End: 2024-10-19
Attending: OBSTETRICS & GYNECOLOGY
Payer: COMMERCIAL

## 2024-10-19 LAB
BASOPHILS # BLD AUTO: 0.1 % (ref 0–1.8)
BASOPHILS # BLD: 0.01 K/UL (ref 0–0.12)
EOSINOPHIL # BLD AUTO: 0.02 K/UL (ref 0–0.51)
EOSINOPHIL NFR BLD: 0.2 % (ref 0–6.9)
ERYTHROCYTE [DISTWIDTH] IN BLOOD BY AUTOMATED COUNT: 48.4 FL (ref 35.9–50)
GLUCOSE 1H P 50 G GLC PO SERPL-MCNC: 121 MG/DL (ref 70–139)
HCT VFR BLD AUTO: 36.3 % (ref 37–47)
HGB BLD-MCNC: 11.9 G/DL (ref 12–16)
IMM GRANULOCYTES # BLD AUTO: 0.09 K/UL (ref 0–0.11)
IMM GRANULOCYTES NFR BLD AUTO: 0.8 % (ref 0–0.9)
LYMPHOCYTES # BLD AUTO: 2.04 K/UL (ref 1–4.8)
LYMPHOCYTES NFR BLD: 18.5 % (ref 22–41)
MCH RBC QN AUTO: 26.7 PG (ref 27–33)
MCHC RBC AUTO-ENTMCNC: 32.8 G/DL (ref 32.2–35.5)
MCV RBC AUTO: 81.6 FL (ref 81.4–97.8)
MONOCYTES # BLD AUTO: 0.5 K/UL (ref 0–0.85)
MONOCYTES NFR BLD AUTO: 4.5 % (ref 0–13.4)
NEUTROPHILS # BLD AUTO: 8.37 K/UL (ref 1.82–7.42)
NEUTROPHILS NFR BLD: 75.9 % (ref 44–72)
NRBC # BLD AUTO: 0 K/UL
NRBC BLD-RTO: 0 /100 WBC (ref 0–0.2)
PLATELET # BLD AUTO: 361 K/UL (ref 164–446)
PMV BLD AUTO: 10.4 FL (ref 9–12.9)
RBC # BLD AUTO: 4.45 M/UL (ref 4.2–5.4)
T PALLIDUM AB SER QL IA: NONREACTIVE
WBC # BLD AUTO: 11 K/UL (ref 4.8–10.8)

## 2024-10-19 PROCEDURE — 82950 GLUCOSE TEST: CPT

## 2024-10-19 PROCEDURE — 85025 COMPLETE CBC W/AUTO DIFF WBC: CPT

## 2024-10-19 PROCEDURE — 36415 COLL VENOUS BLD VENIPUNCTURE: CPT

## 2024-10-19 PROCEDURE — 86780 TREPONEMA PALLIDUM: CPT

## 2024-10-30 ENCOUNTER — ANCILLARY PROCEDURE (OUTPATIENT)
Dept: MATERNAL FETAL MEDICINE | Facility: MEDICAL CENTER | Age: 29
End: 2024-10-30
Attending: OBSTETRICS & GYNECOLOGY
Payer: COMMERCIAL

## 2024-10-30 VITALS — WEIGHT: 293 LBS | BODY MASS INDEX: 56.42 KG/M2 | SYSTOLIC BLOOD PRESSURE: 132 MMHG | DIASTOLIC BLOOD PRESSURE: 86 MMHG

## 2024-10-30 DIAGNOSIS — E66.01 MATERNAL MORBID OBESITY IN THIRD TRIMESTER, ANTEPARTUM (HCC): ICD-10-CM

## 2024-10-30 DIAGNOSIS — Z3A.28 28 WEEKS GESTATION OF PREGNANCY: ICD-10-CM

## 2024-10-30 DIAGNOSIS — O99.213 MATERNAL MORBID OBESITY IN THIRD TRIMESTER, ANTEPARTUM (HCC): ICD-10-CM

## 2024-10-30 ASSESSMENT — FIBROSIS 4 INDEX: FIB4 SCORE: 0.5

## 2024-12-05 ENCOUNTER — ANCILLARY PROCEDURE (OUTPATIENT)
Dept: MATERNAL FETAL MEDICINE | Facility: MEDICAL CENTER | Age: 29
End: 2024-12-05
Attending: OBSTETRICS & GYNECOLOGY
Payer: COMMERCIAL

## 2024-12-05 ENCOUNTER — APPOINTMENT (OUTPATIENT)
Dept: MATERNAL FETAL MEDICINE | Facility: MEDICAL CENTER | Age: 29
End: 2024-12-05
Payer: COMMERCIAL

## 2024-12-05 VITALS
BODY MASS INDEX: 57.95 KG/M2 | WEIGHT: 293 LBS | DIASTOLIC BLOOD PRESSURE: 77 MMHG | SYSTOLIC BLOOD PRESSURE: 111 MMHG | HEART RATE: 89 BPM

## 2024-12-05 DIAGNOSIS — Z3A.33 33 WEEKS GESTATION OF PREGNANCY: ICD-10-CM

## 2024-12-05 DIAGNOSIS — O35.BXX0 ANOMALY OF HEART OF FETUS AFFECTING PREGNANCY, ANTEPARTUM, SINGLE OR UNSPECIFIED FETUS: ICD-10-CM

## 2024-12-05 DIAGNOSIS — O99.213 MATERNAL MORBID OBESITY IN THIRD TRIMESTER, ANTEPARTUM (HCC): ICD-10-CM

## 2024-12-05 DIAGNOSIS — E66.01 MATERNAL MORBID OBESITY IN THIRD TRIMESTER, ANTEPARTUM (HCC): ICD-10-CM

## 2024-12-05 PROCEDURE — 76816 OB US FOLLOW-UP PER FETUS: CPT | Performed by: OBSTETRICS & GYNECOLOGY

## 2024-12-05 ASSESSMENT — FIBROSIS 4 INDEX: FIB4 SCORE: 0.5

## 2024-12-19 ENCOUNTER — HOSPITAL ENCOUNTER (EMERGENCY)
Facility: MEDICAL CENTER | Age: 29
End: 2024-12-19
Attending: OBSTETRICS & GYNECOLOGY | Admitting: OBSTETRICS & GYNECOLOGY
Payer: COMMERCIAL

## 2024-12-19 VITALS
TEMPERATURE: 99 F | BODY MASS INDEX: 50.02 KG/M2 | RESPIRATION RATE: 16 BRPM | SYSTOLIC BLOOD PRESSURE: 120 MMHG | DIASTOLIC BLOOD PRESSURE: 71 MMHG | HEART RATE: 92 BPM | WEIGHT: 293 LBS | HEIGHT: 64 IN

## 2024-12-19 DIAGNOSIS — J10.1 INFLUENZA A: ICD-10-CM

## 2024-12-19 LAB
APPEARANCE UR: CLEAR
BILIRUB UR QL STRIP.AUTO: NEGATIVE
COLOR UR AUTO: NORMAL
FLUAV RNA SPEC QL NAA+PROBE: POSITIVE
FLUBV RNA SPEC QL NAA+PROBE: NEGATIVE
GLUCOSE UR QL STRIP.AUTO: NEGATIVE MG/DL
KETONES UR QL STRIP.AUTO: NEGATIVE MG/DL
LEUKOCYTE ESTERASE UR QL STRIP.AUTO: NEGATIVE
NITRITE UR QL STRIP.AUTO: NEGATIVE
PH UR STRIP.AUTO: 7 [PH] (ref 5–8)
PROT UR QL STRIP: NEGATIVE MG/DL
RBC UR QL AUTO: NEGATIVE
RSV RNA SPEC QL NAA+PROBE: NEGATIVE
SARS-COV-2 RNA RESP QL NAA+PROBE: NOTDETECTED
SP GR UR STRIP.AUTO: 1.02 (ref 1–1.03)
SPECIMEN SOURCE: ABNORMAL
UROBILINOGEN UR STRIP.AUTO-MCNC: 0.2 MG/DL

## 2024-12-19 PROCEDURE — 81002 URINALYSIS NONAUTO W/O SCOPE: CPT

## 2024-12-19 PROCEDURE — 99282 EMERGENCY DEPT VISIT SF MDM: CPT

## 2024-12-19 PROCEDURE — 700102 HCHG RX REV CODE 250 W/ 637 OVERRIDE(OP): Performed by: OBSTETRICS & GYNECOLOGY

## 2024-12-19 PROCEDURE — 59025 FETAL NON-STRESS TEST: CPT

## 2024-12-19 PROCEDURE — A9270 NON-COVERED ITEM OR SERVICE: HCPCS | Performed by: OBSTETRICS & GYNECOLOGY

## 2024-12-19 PROCEDURE — 0241U HCHG SARS-COV-2 COVID-19 NFCT DS RESP RNA 4 TRGT MIC: CPT

## 2024-12-19 RX ORDER — OSELTAMIVIR PHOSPHATE 75 MG/1
75 CAPSULE ORAL 2 TIMES DAILY
Status: DISCONTINUED | OUTPATIENT
Start: 2024-12-19 | End: 2024-12-19 | Stop reason: HOSPADM

## 2024-12-19 RX ORDER — ACETAMINOPHEN 500 MG
1000 TABLET ORAL EVERY 6 HOURS PRN
Status: DISCONTINUED | OUTPATIENT
Start: 2024-12-19 | End: 2024-12-19

## 2024-12-19 RX ORDER — OSELTAMIVIR PHOSPHATE 75 MG/1
75 CAPSULE ORAL 2 TIMES DAILY
Qty: 10 CAPSULE | Refills: 0 | Status: ON HOLD | OUTPATIENT
Start: 2024-12-19 | End: 2024-12-30

## 2024-12-19 RX ORDER — ACETAMINOPHEN 500 MG
1000 TABLET ORAL ONCE
Status: COMPLETED | OUTPATIENT
Start: 2024-12-19 | End: 2024-12-19

## 2024-12-19 RX ORDER — BENZONATATE 100 MG/1
100 CAPSULE ORAL ONCE
Status: COMPLETED | OUTPATIENT
Start: 2024-12-19 | End: 2024-12-19

## 2024-12-19 RX ADMIN — ACETAMINOPHEN 1000 MG: 500 TABLET ORAL at 01:08

## 2024-12-19 RX ADMIN — BENZONATATE 100 MG: 100 CAPSULE ORAL at 01:08

## 2024-12-19 ASSESSMENT — FIBROSIS 4 INDEX: FIB4 SCORE: 0.5

## 2024-12-19 NOTE — PROGRESS NOTES
Pt presents to [facility] c/o cough x 2 days, denies fever, n/v. Pt states started out as dry cough but has turned productive. Pt denies ctx, vaginal bleeding or leaking. FM+ POC discussed. Pt placed on monitors x2.    0056- dr gruber updated. New orders received.

## 2024-12-19 NOTE — ED PROVIDER NOTES
"Shelby Calderon   35w4d/Influenza A      Subjective:  Pt presents to [facility] with c/o of cough x 2 days, denies fever, n/v. Pt states started out as dry cough but has turned productive. Pt denies ctx, vaginal bleeding or leaking. Uterine contractions:no  Pain: No    Objective:   Vitals:    12/19/24 0017 12/19/24 0019 12/19/24 0100 12/19/24 0356   BP:   120/71    Pulse:   92    Resp: 16 16     Temp: (!) 38.1 °C (100.5 °F)   37.2 °C (99 °F)   TempSrc: Temporal   Oral   Weight: (!) 154 kg (340 lb)      Height: 1.626 m (5' 4\")      Gen: NAD  FHT: 150's cat 1  [place]: irritability      Membranes ruptured: .no        Labs:  Recent Results (from the past 24 hours)   CoV-2, Flu A/B, And RSV by PCR ([id number])    Collection Time: 12/19/24 12:52 AM    Specimen: Nasopharyngeal; Respirate   Result Value Ref Range    Influenza virus A RNA POSITIVE (A) Negative    Influenza virus B, PCR Negative Negative    RSV, PCR Negative Negative    SARS-CoV-2 by PCR NotDetected     SARS-CoV-2 Source NP Swab    POCT urinalysis device results    Collection Time: 12/19/24 12:59 AM   Result Value Ref Range    POC Color Dark yellow     POC Appearance Clear     POC Glucose Negative Negative mg/dL    POC Ketones Negative Negative mg/dL    POC Specific Gravity 1.020 1.005 - 1.030    POC Blood Negative Negative    POC Urine PH 7.0 5.0 - 8.0    POC Protein Negative Negative mg/dL    Bilirubin Negative Negative    Urobilinogen, Urine 0.2 Negative    POC Nitrites Negative Negative    POC Leukocyte Esterase Negative Negative       Assessment:   35w4d/Influenza A positive-Will d/c home. Rx for Tamiflu. F/u if sx worsen or pt has f/c.   obesity    Current Outpatient Medications   Medication Sig Dispense Refill    oseltamivir (TAMIFLU) 75 MG Cap Take 1 Capsule by mouth 2 times a day. 10 Capsule 0     "

## 2024-12-19 NOTE — PROGRESS NOTES
0330: report received from ADY Gibbs. Care assumed, pending lab work     0410: Dr. Collado updated on lab results, will come to bs    0455: Dr. Collado at bs, pt states wants to go home to sleep, medications ordered to pt home pharmacy by Dr. Collado, discharge orders received.     0522: Pt ambulated off unit, discharge instructions reviewed, precautions reviewed for when to return, pt verbalizes understanding. Pt and partner deny questions at this time. AVS in hand

## 2024-12-23 ENCOUNTER — HOSPITAL ENCOUNTER (EMERGENCY)
Facility: MEDICAL CENTER | Age: 29
End: 2024-12-23
Attending: OBSTETRICS & GYNECOLOGY | Admitting: OBSTETRICS & GYNECOLOGY
Payer: COMMERCIAL

## 2024-12-23 VITALS
TEMPERATURE: 98 F | HEART RATE: 66 BPM | BODY MASS INDEX: 50.02 KG/M2 | OXYGEN SATURATION: 97 % | DIASTOLIC BLOOD PRESSURE: 65 MMHG | SYSTOLIC BLOOD PRESSURE: 137 MMHG | WEIGHT: 293 LBS | RESPIRATION RATE: 16 BRPM | HEIGHT: 64 IN

## 2024-12-23 LAB
APPEARANCE UR: ABNORMAL
APPEARANCE UR: CLEAR
BACTERIA #/AREA URNS HPF: ABNORMAL /HPF
BILIRUB UR QL STRIP.AUTO: ABNORMAL
BILIRUB UR QL STRIP.AUTO: ABNORMAL
CASTS URNS QL MICRO: ABNORMAL /LPF (ref 0–2)
COLOR UR AUTO: ABNORMAL
COLOR UR: ABNORMAL
EPITHELIAL CELLS 1715: ABNORMAL /HPF (ref 0–5)
GLUCOSE UR QL STRIP.AUTO: NEGATIVE MG/DL
GLUCOSE UR STRIP.AUTO-MCNC: NEGATIVE MG/DL
KETONES UR QL STRIP.AUTO: ABNORMAL MG/DL
KETONES UR STRIP.AUTO-MCNC: ABNORMAL MG/DL
LEUKOCYTE ESTERASE UR QL STRIP.AUTO: ABNORMAL
LEUKOCYTE ESTERASE UR QL STRIP.AUTO: ABNORMAL
MICRO URNS: ABNORMAL
NITRITE UR QL STRIP.AUTO: NEGATIVE
NITRITE UR QL STRIP.AUTO: NEGATIVE
PH UR STRIP.AUTO: 6 [PH] (ref 5–8)
PH UR STRIP.AUTO: 6 [PH] (ref 5–8)
PROT UR QL STRIP: 30 MG/DL
PROT UR QL STRIP: 30 MG/DL
RBC # URNS HPF: ABNORMAL /HPF (ref 0–2)
RBC UR QL AUTO: NEGATIVE
RBC UR QL AUTO: NEGATIVE
SP GR UR STRIP.AUTO: 1.02
SP GR UR STRIP.AUTO: 1.02 (ref 1–1.03)
UROBILINOGEN UR STRIP.AUTO-MCNC: 1 EU/DL
UROBILINOGEN UR STRIP.AUTO-MCNC: 1 MG/DL
WBC #/AREA URNS HPF: ABNORMAL /HPF

## 2024-12-23 PROCEDURE — 96374 THER/PROPH/DIAG INJ IV PUSH: CPT

## 2024-12-23 PROCEDURE — 59025 FETAL NON-STRESS TEST: CPT

## 2024-12-23 PROCEDURE — 81002 URINALYSIS NONAUTO W/O SCOPE: CPT

## 2024-12-23 PROCEDURE — 700105 HCHG RX REV CODE 258: Performed by: OBSTETRICS & GYNECOLOGY

## 2024-12-23 PROCEDURE — 81001 URINALYSIS AUTO W/SCOPE: CPT

## 2024-12-23 PROCEDURE — 302449 STATCHG TRIAGE ONLY (STATISTIC)

## 2024-12-23 PROCEDURE — 87086 URINE CULTURE/COLONY COUNT: CPT

## 2024-12-23 PROCEDURE — 700111 HCHG RX REV CODE 636 W/ 250 OVERRIDE (IP): Mod: JZ | Performed by: OBSTETRICS & GYNECOLOGY

## 2024-12-23 RX ORDER — SODIUM CHLORIDE, SODIUM LACTATE, POTASSIUM CHLORIDE, CALCIUM CHLORIDE 600; 310; 30; 20 MG/100ML; MG/100ML; MG/100ML; MG/100ML
1000 INJECTION, SOLUTION INTRAVENOUS ONCE
Status: COMPLETED | OUTPATIENT
Start: 2024-12-23 | End: 2024-12-23

## 2024-12-23 RX ORDER — ONDANSETRON 2 MG/ML
4 INJECTION INTRAMUSCULAR; INTRAVENOUS EVERY 4 HOURS PRN
Status: DISCONTINUED | OUTPATIENT
Start: 2024-12-23 | End: 2024-12-23 | Stop reason: HOSPADM

## 2024-12-23 RX ADMIN — SODIUM CHLORIDE, POTASSIUM CHLORIDE, SODIUM LACTATE AND CALCIUM CHLORIDE 1000 ML: 600; 310; 30; 20 INJECTION, SOLUTION INTRAVENOUS at 14:11

## 2024-12-23 RX ADMIN — ONDANSETRON 4 MG: 2 INJECTION INTRAMUSCULAR; INTRAVENOUS at 14:07

## 2024-12-23 ASSESSMENT — FIBROSIS 4 INDEX: FIB4 SCORE: 0.5

## 2024-12-23 ASSESSMENT — PAIN DESCRIPTION - PAIN TYPE
TYPE: ACUTE PAIN

## 2024-12-23 ASSESSMENT — PAIN SCALES - GENERAL: PAINLEVEL: 3

## 2024-12-23 NOTE — PROGRESS NOTES
1300  Report from Kareen BENJAMIN in room with pt, pt has no new complaints at this time.  Pt does  desire to eat, position change at this time and NPO until discuss with MD.  1330  Dr. Palacios phoned, orders for IV fluids and medications, pt may eat a little too.  1545  Report to Dr. Palacios and orders for DC received at this time, IV removed, POC discussed and pt states understanding to return for any signs of labor.  Pt does not desire any at home medications.  1608  Pt into regular clothing and to home with FOB via ambulation at this time.

## 2024-12-23 NOTE — PROGRESS NOTES
1208: Pt is a  at 36.1 weeks today, pt presents to L&D with c/o upper abd pain and episodes of vomiting beginning last night. Pt reports testing + for Flu A last week, see results for details. Pt reports +FM, denies VB/LOF at this time. EFM and TOCO applied, VS taken, pt comfortable in bed at this time.     1230: This RN attempt to call Dr Collado, unable to reach, voicemail left.    1300: Report given to Suzanne GARSIA, POC discussed.

## 2024-12-23 NOTE — DISCHARGE INSTRUCTIONS
Pre-term Labor (<37 weeks):  Call your physician or return to the hospital if:  You have painless regular contractions more than 4 in one hour.  Your water breaks (remember time and color).  You have menstrual-like cramps, a low dull backache or pressure in your pelvis or back.  Your baby does not move enough to complete the daily kick count (10 movements in 2 hours).  Your baby moves much less often than on the days before or you have not felt your baby move all day.  Please review the MEDICATION LIST section of your AFTER VISIT SUMMARY document.  Take your medication as prescribed      Follow up with your OB at your next regular visit.  Return to the hospital for any signs of labor or if you are having decreased fetal movement.  Eat and Drink regularly and call your OB with any questions or concerns.

## 2024-12-24 ENCOUNTER — APPOINTMENT (OUTPATIENT)
Dept: RADIOLOGY | Facility: MEDICAL CENTER | Age: 29
End: 2024-12-24
Attending: OBSTETRICS & GYNECOLOGY
Payer: COMMERCIAL

## 2024-12-24 ENCOUNTER — HOSPITAL ENCOUNTER (INPATIENT)
Facility: MEDICAL CENTER | Age: 29
LOS: 6 days | End: 2024-12-30
Attending: OBSTETRICS & GYNECOLOGY | Admitting: OBSTETRICS & GYNECOLOGY
Payer: COMMERCIAL

## 2024-12-24 ENCOUNTER — ANESTHESIA EVENT (OUTPATIENT)
Dept: OBGYN | Facility: MEDICAL CENTER | Age: 29
End: 2024-12-24
Payer: COMMERCIAL

## 2024-12-24 ENCOUNTER — ANESTHESIA (OUTPATIENT)
Dept: OBGYN | Facility: MEDICAL CENTER | Age: 29
End: 2024-12-24
Payer: COMMERCIAL

## 2024-12-24 ENCOUNTER — APPOINTMENT (OUTPATIENT)
Dept: RADIOLOGY | Facility: MEDICAL CENTER | Age: 29
End: 2024-12-24
Attending: SURGERY
Payer: COMMERCIAL

## 2024-12-24 DIAGNOSIS — I10 HYPERTENSION, UNSPECIFIED TYPE: ICD-10-CM

## 2024-12-24 DIAGNOSIS — J10.1 INFLUENZA A: ICD-10-CM

## 2024-12-24 DIAGNOSIS — G89.18 PAIN FOLLOWING SURGERY OR PROCEDURE: ICD-10-CM

## 2024-12-24 DIAGNOSIS — D62 ACUTE BLOOD LOSS ANEMIA: ICD-10-CM

## 2024-12-24 LAB
ALBUMIN SERPL BCP-MCNC: 3.1 G/DL (ref 3.2–4.9)
ALBUMIN/GLOB SERPL: 0.8 G/DL
ALP SERPL-CCNC: 297 U/L (ref 30–99)
ALT SERPL-CCNC: 237 U/L (ref 2–50)
AMMONIA PLAS-SCNC: 19 UMOL/L (ref 11–45)
AMYLASE SERPL-CCNC: 717 U/L (ref 20–103)
ANION GAP SERPL CALC-SCNC: 14 MMOL/L (ref 7–16)
APTT PPP: 26.7 SEC (ref 24.7–36)
AST SERPL-CCNC: 313 U/L (ref 12–45)
BASOPHILS # BLD AUTO: 0.1 % (ref 0–1.8)
BASOPHILS # BLD: 0.02 K/UL (ref 0–0.12)
BILIRUB SERPL-MCNC: 2.4 MG/DL (ref 0.1–1.5)
BUN SERPL-MCNC: 9 MG/DL (ref 8–22)
CALCIUM ALBUM COR SERPL-MCNC: 9.6 MG/DL (ref 8.5–10.5)
CALCIUM SERPL-MCNC: 8.9 MG/DL (ref 8.5–10.5)
CHLORIDE SERPL-SCNC: 104 MMOL/L (ref 96–112)
CHOLEST SERPL-MCNC: 200 MG/DL (ref 100–199)
CO2 SERPL-SCNC: 19 MMOL/L (ref 20–33)
CREAT SERPL-MCNC: 0.57 MG/DL (ref 0.5–1.4)
CREAT UR-MCNC: 273 MG/DL
EOSINOPHIL # BLD AUTO: 0 K/UL (ref 0–0.51)
EOSINOPHIL NFR BLD: 0 % (ref 0–6.9)
ERYTHROCYTE [DISTWIDTH] IN BLOOD BY AUTOMATED COUNT: 47.2 FL (ref 35.9–50)
FIBRINOGEN PPP-MCNC: 576 MG/DL (ref 215–460)
GFR SERPLBLD CREATININE-BSD FMLA CKD-EPI: 124 ML/MIN/1.73 M 2
GLOBULIN SER CALC-MCNC: 4 G/DL (ref 1.9–3.5)
GLUCOSE SERPL-MCNC: 128 MG/DL (ref 65–99)
HAV IGM SERPL QL IA: NONREACTIVE
HBV CORE IGM SER QL: NONREACTIVE
HBV SURFACE AG SER QL: NONREACTIVE
HCT VFR BLD AUTO: 37.9 % (ref 37–47)
HCV AB SER QL: NONREACTIVE
HDLC SERPL-MCNC: 64 MG/DL
HGB BLD-MCNC: 11.9 G/DL (ref 12–16)
HOLDING TUBE BB 8507: NORMAL
IMM GRANULOCYTES # BLD AUTO: 0.06 K/UL (ref 0–0.11)
IMM GRANULOCYTES NFR BLD AUTO: 0.4 % (ref 0–0.9)
INR PPP: 1.02 (ref 0.87–1.13)
LDH SERPL L TO P-CCNC: 346 U/L (ref 107–266)
LDLC SERPL CALC-MCNC: 107 MG/DL
LIPASE SERPL-CCNC: 2080 U/L (ref 11–82)
LYMPHOCYTES # BLD AUTO: 1.24 K/UL (ref 1–4.8)
LYMPHOCYTES NFR BLD: 9.2 % (ref 22–41)
MAGNESIUM SERPL-MCNC: 3.3 MG/DL (ref 1.5–2.5)
MAGNESIUM SERPL-MCNC: 3.7 MG/DL (ref 1.5–2.5)
MCH RBC QN AUTO: 25 PG (ref 27–33)
MCHC RBC AUTO-ENTMCNC: 31.4 G/DL (ref 32.2–35.5)
MCV RBC AUTO: 79.6 FL (ref 81.4–97.8)
MONOCYTES # BLD AUTO: 0.4 K/UL (ref 0–0.85)
MONOCYTES NFR BLD AUTO: 3 % (ref 0–13.4)
NEUTROPHILS # BLD AUTO: 11.78 K/UL (ref 1.82–7.42)
NEUTROPHILS NFR BLD: 87.3 % (ref 44–72)
NRBC # BLD AUTO: 0 K/UL
NRBC BLD-RTO: 0 /100 WBC (ref 0–0.2)
PLATELET # BLD AUTO: 353 K/UL (ref 164–446)
PMV BLD AUTO: 9.3 FL (ref 9–12.9)
POTASSIUM SERPL-SCNC: 3.9 MMOL/L (ref 3.6–5.5)
PROT SERPL-MCNC: 7.1 G/DL (ref 6–8.2)
PROT UR-MCNC: 98.7 MG/DL (ref 0–15)
PROT/CREAT UR: 362 MG/G (ref 10–107)
PROTHROMBIN TIME: 13.4 SEC (ref 12–14.6)
RBC # BLD AUTO: 4.76 M/UL (ref 4.2–5.4)
SODIUM SERPL-SCNC: 137 MMOL/L (ref 135–145)
T PALLIDUM AB SER QL IA: NONREACTIVE
TRIGL SERPL-MCNC: 144 MG/DL (ref 0–149)
URATE SERPL-MCNC: 5.2 MG/DL (ref 1.9–8.2)
WBC # BLD AUTO: 13.5 K/UL (ref 4.8–10.8)

## 2024-12-24 PROCEDURE — 84550 ASSAY OF BLOOD/URIC ACID: CPT

## 2024-12-24 PROCEDURE — 85025 COMPLETE CBC W/AUTO DIFF WBC: CPT

## 2024-12-24 PROCEDURE — 80074 ACUTE HEPATITIS PANEL: CPT

## 2024-12-24 PROCEDURE — 700102 HCHG RX REV CODE 250 W/ 637 OVERRIDE(OP): Performed by: SURGERY

## 2024-12-24 PROCEDURE — A9270 NON-COVERED ITEM OR SERVICE: HCPCS | Performed by: OBSTETRICS & GYNECOLOGY

## 2024-12-24 PROCEDURE — A9270 NON-COVERED ITEM OR SERVICE: HCPCS | Performed by: STUDENT IN AN ORGANIZED HEALTH CARE EDUCATION/TRAINING PROGRAM

## 2024-12-24 PROCEDURE — 82570 ASSAY OF URINE CREATININE: CPT

## 2024-12-24 PROCEDURE — 82150 ASSAY OF AMYLASE: CPT

## 2024-12-24 PROCEDURE — 700105 HCHG RX REV CODE 258: Performed by: OBSTETRICS & GYNECOLOGY

## 2024-12-24 PROCEDURE — 700111 HCHG RX REV CODE 636 W/ 250 OVERRIDE (IP): Performed by: OBSTETRICS & GYNECOLOGY

## 2024-12-24 PROCEDURE — 160048 HCHG OR STATISTICAL LEVEL 1-5: Performed by: OBSTETRICS & GYNECOLOGY

## 2024-12-24 PROCEDURE — 80053 COMPREHEN METABOLIC PANEL: CPT

## 2024-12-24 PROCEDURE — 700105 HCHG RX REV CODE 258: Performed by: STUDENT IN AN ORGANIZED HEALTH CARE EDUCATION/TRAINING PROGRAM

## 2024-12-24 PROCEDURE — 86780 TREPONEMA PALLIDUM: CPT

## 2024-12-24 PROCEDURE — 160002 HCHG RECOVERY MINUTES (STAT): Performed by: OBSTETRICS & GYNECOLOGY

## 2024-12-24 PROCEDURE — 87150 DNA/RNA AMPLIFIED PROBE: CPT

## 2024-12-24 PROCEDURE — 160035 HCHG PACU - 1ST 60 MINS PHASE I: Performed by: OBSTETRICS & GYNECOLOGY

## 2024-12-24 PROCEDURE — 99282 EMERGENCY DEPT VISIT SF MDM: CPT

## 2024-12-24 PROCEDURE — 80061 LIPID PANEL: CPT

## 2024-12-24 PROCEDURE — 59025 FETAL NON-STRESS TEST: CPT

## 2024-12-24 PROCEDURE — 85384 FIBRINOGEN ACTIVITY: CPT

## 2024-12-24 PROCEDURE — 160009 HCHG ANES TIME/MIN: Performed by: OBSTETRICS & GYNECOLOGY

## 2024-12-24 PROCEDURE — 700105 HCHG RX REV CODE 258: Performed by: SURGERY

## 2024-12-24 PROCEDURE — 85610 PROTHROMBIN TIME: CPT

## 2024-12-24 PROCEDURE — 88307 TISSUE EXAM BY PATHOLOGIST: CPT

## 2024-12-24 PROCEDURE — 99223 1ST HOSP IP/OBS HIGH 75: CPT | Performed by: OBSTETRICS & GYNECOLOGY

## 2024-12-24 PROCEDURE — 99284 EMERGENCY DEPT VISIT MOD MDM: CPT

## 2024-12-24 PROCEDURE — 99221 1ST HOSP IP/OBS SF/LOW 40: CPT | Performed by: SURGERY

## 2024-12-24 PROCEDURE — 770002 HCHG ROOM/CARE - OB PRIVATE (112)

## 2024-12-24 PROCEDURE — 84156 ASSAY OF PROTEIN URINE: CPT

## 2024-12-24 PROCEDURE — 36415 COLL VENOUS BLD VENIPUNCTURE: CPT

## 2024-12-24 PROCEDURE — C1755 CATHETER, INTRASPINAL: HCPCS | Performed by: OBSTETRICS & GYNECOLOGY

## 2024-12-24 PROCEDURE — 87081 CULTURE SCREEN ONLY: CPT

## 2024-12-24 PROCEDURE — 82140 ASSAY OF AMMONIA: CPT

## 2024-12-24 PROCEDURE — 83615 LACTATE (LD) (LDH) ENZYME: CPT

## 2024-12-24 PROCEDURE — 160041 HCHG SURGERY MINUTES - EA ADDL 1 MIN LEVEL 4: Performed by: OBSTETRICS & GYNECOLOGY

## 2024-12-24 PROCEDURE — 700102 HCHG RX REV CODE 250 W/ 637 OVERRIDE(OP): Performed by: OBSTETRICS & GYNECOLOGY

## 2024-12-24 PROCEDURE — 74181 MRI ABDOMEN W/O CONTRAST: CPT

## 2024-12-24 PROCEDURE — 160029 HCHG SURGERY MINUTES - 1ST 30 MINS LEVEL 4: Performed by: OBSTETRICS & GYNECOLOGY

## 2024-12-24 PROCEDURE — 85730 THROMBOPLASTIN TIME PARTIAL: CPT

## 2024-12-24 PROCEDURE — 83735 ASSAY OF MAGNESIUM: CPT | Mod: 91

## 2024-12-24 PROCEDURE — 700111 HCHG RX REV CODE 636 W/ 250 OVERRIDE (IP): Mod: JZ | Performed by: STUDENT IN AN ORGANIZED HEALTH CARE EDUCATION/TRAINING PROGRAM

## 2024-12-24 PROCEDURE — 83690 ASSAY OF LIPASE: CPT

## 2024-12-24 PROCEDURE — 700102 HCHG RX REV CODE 250 W/ 637 OVERRIDE(OP): Performed by: STUDENT IN AN ORGANIZED HEALTH CARE EDUCATION/TRAINING PROGRAM

## 2024-12-24 PROCEDURE — 76705 ECHO EXAM OF ABDOMEN: CPT

## 2024-12-24 PROCEDURE — A9270 NON-COVERED ITEM OR SERVICE: HCPCS | Performed by: SURGERY

## 2024-12-24 RX ORDER — OXYCODONE HYDROCHLORIDE 10 MG/1
10 TABLET ORAL EVERY 4 HOURS PRN
Status: DISCONTINUED | OUTPATIENT
Start: 2024-12-25 | End: 2024-12-24

## 2024-12-24 RX ORDER — OXYCODONE HCL 5 MG/5 ML
5 SOLUTION, ORAL ORAL
Status: DISCONTINUED | OUTPATIENT
Start: 2024-12-24 | End: 2024-12-25

## 2024-12-24 RX ORDER — SODIUM CHLORIDE, SODIUM LACTATE, POTASSIUM CHLORIDE, CALCIUM CHLORIDE 600; 310; 30; 20 MG/100ML; MG/100ML; MG/100ML; MG/100ML
INJECTION, SOLUTION INTRAVENOUS CONTINUOUS
Status: DISCONTINUED | OUTPATIENT
Start: 2024-12-24 | End: 2024-12-24

## 2024-12-24 RX ORDER — OXYCODONE HYDROCHLORIDE 10 MG/1
10 TABLET ORAL EVERY 4 HOURS PRN
Status: DISCONTINUED | OUTPATIENT
Start: 2024-12-24 | End: 2024-12-25

## 2024-12-24 RX ORDER — ACETAMINOPHEN 500 MG
1000 TABLET ORAL EVERY 6 HOURS PRN
Status: DISCONTINUED | OUTPATIENT
Start: 2024-12-27 | End: 2024-12-24

## 2024-12-24 RX ORDER — SODIUM CHLORIDE, SODIUM LACTATE, POTASSIUM CHLORIDE, CALCIUM CHLORIDE 600; 310; 30; 20 MG/100ML; MG/100ML; MG/100ML; MG/100ML
INJECTION, SOLUTION INTRAVENOUS CONTINUOUS
Status: DISCONTINUED | OUTPATIENT
Start: 2024-12-24 | End: 2024-12-26

## 2024-12-24 RX ORDER — IBUPROFEN 800 MG/1
800 TABLET, FILM COATED ORAL EVERY 8 HOURS PRN
Status: DISCONTINUED | OUTPATIENT
Start: 2024-12-28 | End: 2024-12-26

## 2024-12-24 RX ORDER — SODIUM CHLORIDE 9 MG/ML
INJECTION, SOLUTION INTRAVENOUS ONCE
Status: DISCONTINUED | OUTPATIENT
Start: 2024-12-24 | End: 2024-12-25

## 2024-12-24 RX ORDER — OXYCODONE HYDROCHLORIDE 10 MG/1
10 TABLET ORAL EVERY 4 HOURS PRN
Status: DISCONTINUED | OUTPATIENT
Start: 2024-12-24 | End: 2024-12-24

## 2024-12-24 RX ORDER — NIFEDIPINE 10 MG/1
10 CAPSULE ORAL
Status: COMPLETED | OUTPATIENT
Start: 2024-12-24 | End: 2024-12-26

## 2024-12-24 RX ORDER — HYDROMORPHONE HYDROCHLORIDE 1 MG/ML
0.2 INJECTION, SOLUTION INTRAMUSCULAR; INTRAVENOUS; SUBCUTANEOUS
Status: ACTIVE | OUTPATIENT
Start: 2024-12-24 | End: 2024-12-25

## 2024-12-24 RX ORDER — ONDANSETRON 2 MG/ML
4 INJECTION INTRAMUSCULAR; INTRAVENOUS
Status: COMPLETED | OUTPATIENT
Start: 2024-12-24 | End: 2024-12-24

## 2024-12-24 RX ORDER — EPHEDRINE SULFATE 50 MG/ML
5 INJECTION, SOLUTION INTRAVENOUS
Status: DISCONTINUED | OUTPATIENT
Start: 2024-12-24 | End: 2024-12-25

## 2024-12-24 RX ORDER — DIPHENHYDRAMINE HYDROCHLORIDE 50 MG/ML
12.5 INJECTION INTRAMUSCULAR; INTRAVENOUS EVERY 6 HOURS PRN
Status: ACTIVE | OUTPATIENT
Start: 2024-12-24 | End: 2024-12-25

## 2024-12-24 RX ORDER — SODIUM CHLORIDE, SODIUM LACTATE, POTASSIUM CHLORIDE, CALCIUM CHLORIDE 600; 310; 30; 20 MG/100ML; MG/100ML; MG/100ML; MG/100ML
INJECTION, SOLUTION INTRAVENOUS CONTINUOUS
Status: DISCONTINUED | OUTPATIENT
Start: 2024-12-24 | End: 2024-12-25

## 2024-12-24 RX ORDER — OXYCODONE HYDROCHLORIDE 5 MG/1
5 TABLET ORAL EVERY 4 HOURS PRN
Status: ACTIVE | OUTPATIENT
Start: 2024-12-24 | End: 2024-12-25

## 2024-12-24 RX ORDER — HYDRALAZINE HYDROCHLORIDE 20 MG/ML
5 INJECTION INTRAMUSCULAR; INTRAVENOUS
Status: DISCONTINUED | OUTPATIENT
Start: 2024-12-24 | End: 2024-12-25

## 2024-12-24 RX ORDER — ENOXAPARIN SODIUM 100 MG/ML
40 INJECTION SUBCUTANEOUS DAILY
Status: DISCONTINUED | OUTPATIENT
Start: 2024-12-25 | End: 2024-12-27 | Stop reason: HOSPADM

## 2024-12-24 RX ORDER — HYDROMORPHONE HYDROCHLORIDE 1 MG/ML
1 INJECTION, SOLUTION INTRAMUSCULAR; INTRAVENOUS; SUBCUTANEOUS
Status: DISCONTINUED | OUTPATIENT
Start: 2024-12-24 | End: 2024-12-30 | Stop reason: HOSPADM

## 2024-12-24 RX ORDER — KETOROLAC TROMETHAMINE 15 MG/ML
15 INJECTION, SOLUTION INTRAMUSCULAR; INTRAVENOUS EVERY 6 HOURS
Status: COMPLETED | OUTPATIENT
Start: 2024-12-24 | End: 2024-12-25

## 2024-12-24 RX ORDER — SODIUM CHLORIDE, SODIUM LACTATE, POTASSIUM CHLORIDE, CALCIUM CHLORIDE 600; 310; 30; 20 MG/100ML; MG/100ML; MG/100ML; MG/100ML
2000 INJECTION, SOLUTION INTRAVENOUS PRN
Status: DISCONTINUED | OUTPATIENT
Start: 2024-12-24 | End: 2024-12-26

## 2024-12-24 RX ORDER — ACETAMINOPHEN 500 MG
1000 TABLET ORAL EVERY 6 HOURS
Status: DISCONTINUED | OUTPATIENT
Start: 2024-12-24 | End: 2024-12-24

## 2024-12-24 RX ORDER — DIPHENHYDRAMINE HYDROCHLORIDE 50 MG/ML
25 INJECTION INTRAMUSCULAR; INTRAVENOUS EVERY 6 HOURS PRN
Status: ACTIVE | OUTPATIENT
Start: 2024-12-24 | End: 2024-12-25

## 2024-12-24 RX ORDER — VITAMIN A ACETATE, BETA CAROTENE, ASCORBIC ACID, CHOLECALCIFEROL, .ALPHA.-TOCOPHEROL ACETATE, DL-, THIAMINE MONONITRATE, RIBOFLAVIN, NIACINAMIDE, PYRIDOXINE HYDROCHLORIDE, FOLIC ACID, CYANOCOBALAMIN, CALCIUM CARBONATE, FERROUS FUMARATE, ZINC OXIDE, CUPRIC OXIDE 3080; 12; 120; 400; 1; 1.84; 3; 20; 22; 920; 25; 200; 27; 10; 2 [IU]/1; UG/1; MG/1; [IU]/1; MG/1; MG/1; MG/1; MG/1; MG/1; [IU]/1; MG/1; MG/1; MG/1; MG/1; MG/1
1 TABLET, FILM COATED ORAL
Status: DISCONTINUED | OUTPATIENT
Start: 2024-12-24 | End: 2024-12-27 | Stop reason: HOSPADM

## 2024-12-24 RX ORDER — HYDROMORPHONE HYDROCHLORIDE 1 MG/ML
0.1 INJECTION, SOLUTION INTRAMUSCULAR; INTRAVENOUS; SUBCUTANEOUS
Status: DISCONTINUED | OUTPATIENT
Start: 2024-12-24 | End: 2024-12-25

## 2024-12-24 RX ORDER — CALCIUM GLUCONATE 94 MG/ML
1 INJECTION, SOLUTION INTRAVENOUS
Status: DISCONTINUED | OUTPATIENT
Start: 2024-12-24 | End: 2024-12-24 | Stop reason: HOSPADM

## 2024-12-24 RX ORDER — CITRIC ACID/SODIUM CITRATE 334-500MG
30 SOLUTION, ORAL ORAL ONCE
Status: COMPLETED | OUTPATIENT
Start: 2024-12-24 | End: 2024-12-24

## 2024-12-24 RX ORDER — CEFAZOLIN SODIUM 1 G/3ML
2 INJECTION, POWDER, FOR SOLUTION INTRAMUSCULAR; INTRAVENOUS ONCE
Status: DISCONTINUED | OUTPATIENT
Start: 2024-12-24 | End: 2024-12-24 | Stop reason: HOSPADM

## 2024-12-24 RX ORDER — CEFAZOLIN SODIUM 1 G/3ML
INJECTION, POWDER, FOR SOLUTION INTRAMUSCULAR; INTRAVENOUS PRN
Status: DISCONTINUED | OUTPATIENT
Start: 2024-12-24 | End: 2024-12-24 | Stop reason: SURG

## 2024-12-24 RX ORDER — ONDANSETRON 2 MG/ML
INJECTION INTRAMUSCULAR; INTRAVENOUS PRN
Status: DISCONTINUED | OUTPATIENT
Start: 2024-12-24 | End: 2024-12-24 | Stop reason: SURG

## 2024-12-24 RX ORDER — OXYCODONE HYDROCHLORIDE 10 MG/1
10 TABLET ORAL EVERY 4 HOURS PRN
Status: DISPENSED | OUTPATIENT
Start: 2024-12-24 | End: 2024-12-25

## 2024-12-24 RX ORDER — MAGNESIUM SULFATE HEPTAHYDRATE 40 MG/ML
2 INJECTION, SOLUTION INTRAVENOUS CONTINUOUS
Status: DISCONTINUED | OUTPATIENT
Start: 2024-12-24 | End: 2024-12-25

## 2024-12-24 RX ORDER — LABETALOL HYDROCHLORIDE 5 MG/ML
20-80 INJECTION, SOLUTION INTRAVENOUS
Status: DISCONTINUED | OUTPATIENT
Start: 2024-12-24 | End: 2024-12-24 | Stop reason: HOSPADM

## 2024-12-24 RX ORDER — OXYCODONE HYDROCHLORIDE 5 MG/1
5 TABLET ORAL EVERY 4 HOURS PRN
Status: DISCONTINUED | OUTPATIENT
Start: 2024-12-24 | End: 2024-12-24

## 2024-12-24 RX ORDER — ONDANSETRON 2 MG/ML
4 INJECTION INTRAMUSCULAR; INTRAVENOUS EVERY 6 HOURS PRN
Status: ACTIVE | OUTPATIENT
Start: 2024-12-24 | End: 2024-12-25

## 2024-12-24 RX ORDER — CALCIUM CARBONATE 500 MG/1
1000 TABLET, CHEWABLE ORAL EVERY 6 HOURS PRN
Status: DISCONTINUED | OUTPATIENT
Start: 2024-12-24 | End: 2024-12-27 | Stop reason: HOSPADM

## 2024-12-24 RX ORDER — HYDROMORPHONE HYDROCHLORIDE 1 MG/ML
0.4 INJECTION, SOLUTION INTRAMUSCULAR; INTRAVENOUS; SUBCUTANEOUS
Status: DISPENSED | OUTPATIENT
Start: 2024-12-24 | End: 2024-12-25

## 2024-12-24 RX ORDER — METOCLOPRAMIDE HYDROCHLORIDE 5 MG/ML
10 INJECTION INTRAMUSCULAR; INTRAVENOUS ONCE
Status: DISCONTINUED | OUTPATIENT
Start: 2024-12-24 | End: 2024-12-24 | Stop reason: HOSPADM

## 2024-12-24 RX ORDER — SODIUM CHLORIDE, SODIUM GLUCONATE, SODIUM ACETATE, POTASSIUM CHLORIDE AND MAGNESIUM CHLORIDE 526; 502; 368; 37; 30 MG/100ML; MG/100ML; MG/100ML; MG/100ML; MG/100ML
INJECTION, SOLUTION INTRAVENOUS
Status: DISCONTINUED | OUTPATIENT
Start: 2024-12-24 | End: 2024-12-24 | Stop reason: SURG

## 2024-12-24 RX ORDER — SODIUM CHLORIDE, SODIUM GLUCONATE, SODIUM ACETATE, POTASSIUM CHLORIDE AND MAGNESIUM CHLORIDE 526; 502; 368; 37; 30 MG/100ML; MG/100ML; MG/100ML; MG/100ML; MG/100ML
1000 INJECTION, SOLUTION INTRAVENOUS ONCE
Status: COMPLETED | OUTPATIENT
Start: 2024-12-24 | End: 2024-12-24

## 2024-12-24 RX ORDER — ONDANSETRON 2 MG/ML
4 INJECTION INTRAMUSCULAR; INTRAVENOUS EVERY 6 HOURS PRN
Status: DISCONTINUED | OUTPATIENT
Start: 2024-12-25 | End: 2024-12-25

## 2024-12-24 RX ORDER — OXYTOCIN 10 [USP'U]/ML
10 INJECTION, SOLUTION INTRAMUSCULAR; INTRAVENOUS
Status: DISCONTINUED | OUTPATIENT
Start: 2024-12-24 | End: 2024-12-25

## 2024-12-24 RX ORDER — IPRATROPIUM BROMIDE AND ALBUTEROL SULFATE 2.5; .5 MG/3ML; MG/3ML
3 SOLUTION RESPIRATORY (INHALATION)
Status: DISCONTINUED | OUTPATIENT
Start: 2024-12-24 | End: 2024-12-25

## 2024-12-24 RX ORDER — EPHEDRINE SULFATE 50 MG/ML
10 INJECTION, SOLUTION INTRAVENOUS
Status: ACTIVE | OUTPATIENT
Start: 2024-12-24 | End: 2024-12-25

## 2024-12-24 RX ORDER — NIFEDIPINE 10 MG/1
10 CAPSULE ORAL
Status: DISCONTINUED | OUTPATIENT
Start: 2024-12-24 | End: 2024-12-24 | Stop reason: HOSPADM

## 2024-12-24 RX ORDER — LABETALOL HYDROCHLORIDE 5 MG/ML
5 INJECTION, SOLUTION INTRAVENOUS
Status: DISCONTINUED | OUTPATIENT
Start: 2024-12-24 | End: 2024-12-25

## 2024-12-24 RX ORDER — SODIUM CHLORIDE, SODIUM LACTATE, POTASSIUM CHLORIDE, AND CALCIUM CHLORIDE .6; .31; .03; .02 G/100ML; G/100ML; G/100ML; G/100ML
1000 INJECTION, SOLUTION INTRAVENOUS ONCE
Status: COMPLETED | OUTPATIENT
Start: 2024-12-24 | End: 2024-12-24

## 2024-12-24 RX ORDER — MAGNESIUM SULFATE HEPTAHYDRATE 40 MG/ML
4 INJECTION, SOLUTION INTRAVENOUS ONCE
Status: COMPLETED | OUTPATIENT
Start: 2024-12-24 | End: 2024-12-24

## 2024-12-24 RX ORDER — DIPHENHYDRAMINE HYDROCHLORIDE 50 MG/ML
12.5 INJECTION INTRAMUSCULAR; INTRAVENOUS
Status: DISCONTINUED | OUTPATIENT
Start: 2024-12-24 | End: 2024-12-25

## 2024-12-24 RX ORDER — ONDANSETRON 4 MG/1
4 TABLET, ORALLY DISINTEGRATING ORAL EVERY 6 HOURS PRN
Status: DISCONTINUED | OUTPATIENT
Start: 2024-12-25 | End: 2024-12-25

## 2024-12-24 RX ORDER — DOCUSATE SODIUM 100 MG/1
100 CAPSULE, LIQUID FILLED ORAL 2 TIMES DAILY PRN
Status: DISCONTINUED | OUTPATIENT
Start: 2024-12-24 | End: 2024-12-27 | Stop reason: HOSPADM

## 2024-12-24 RX ORDER — ACETAMINOPHEN 500 MG
1000 TABLET ORAL EVERY 6 HOURS
Status: DISCONTINUED | OUTPATIENT
Start: 2024-12-25 | End: 2024-12-24

## 2024-12-24 RX ORDER — ONDANSETRON 2 MG/ML
4 INJECTION INTRAMUSCULAR; INTRAVENOUS EVERY 4 HOURS PRN
Status: DISCONTINUED | OUTPATIENT
Start: 2024-12-24 | End: 2024-12-24

## 2024-12-24 RX ORDER — BUPIVACAINE HYDROCHLORIDE 7.5 MG/ML
INJECTION, SOLUTION INTRASPINAL
Status: COMPLETED | OUTPATIENT
Start: 2024-12-24 | End: 2024-12-24

## 2024-12-24 RX ORDER — HYDROMORPHONE HYDROCHLORIDE 1 MG/ML
0.4 INJECTION, SOLUTION INTRAMUSCULAR; INTRAVENOUS; SUBCUTANEOUS
Status: DISCONTINUED | OUTPATIENT
Start: 2024-12-24 | End: 2024-12-25

## 2024-12-24 RX ORDER — SIMETHICONE 125 MG
125 TABLET,CHEWABLE ORAL 4 TIMES DAILY PRN
Status: DISCONTINUED | OUTPATIENT
Start: 2024-12-24 | End: 2024-12-27 | Stop reason: HOSPADM

## 2024-12-24 RX ORDER — HYDRALAZINE HYDROCHLORIDE 20 MG/ML
5-10 INJECTION INTRAMUSCULAR; INTRAVENOUS
Status: DISCONTINUED | OUTPATIENT
Start: 2024-12-24 | End: 2024-12-24 | Stop reason: HOSPADM

## 2024-12-24 RX ORDER — DIPHENHYDRAMINE HYDROCHLORIDE 50 MG/ML
25 INJECTION INTRAMUSCULAR; INTRAVENOUS EVERY 6 HOURS PRN
Status: DISCONTINUED | OUTPATIENT
Start: 2024-12-25 | End: 2024-12-25

## 2024-12-24 RX ORDER — OXYCODONE HCL 5 MG/5 ML
10 SOLUTION, ORAL ORAL
Status: DISCONTINUED | OUTPATIENT
Start: 2024-12-24 | End: 2024-12-25

## 2024-12-24 RX ORDER — ACETAMINOPHEN 500 MG
1000 TABLET ORAL EVERY 6 HOURS PRN
Status: DISCONTINUED | OUTPATIENT
Start: 2024-12-28 | End: 2024-12-24

## 2024-12-24 RX ORDER — OXYCODONE HYDROCHLORIDE 5 MG/1
5 TABLET ORAL EVERY 4 HOURS PRN
Status: DISCONTINUED | OUTPATIENT
Start: 2024-12-25 | End: 2024-12-25

## 2024-12-24 RX ORDER — METOCLOPRAMIDE HYDROCHLORIDE 5 MG/ML
10 INJECTION INTRAMUSCULAR; INTRAVENOUS EVERY 6 HOURS PRN
Status: DISCONTINUED | OUTPATIENT
Start: 2024-12-24 | End: 2024-12-25

## 2024-12-24 RX ORDER — METOCLOPRAMIDE HYDROCHLORIDE 5 MG/ML
10 INJECTION INTRAMUSCULAR; INTRAVENOUS EVERY 6 HOURS
Status: DISCONTINUED | OUTPATIENT
Start: 2024-12-24 | End: 2024-12-24

## 2024-12-24 RX ORDER — IBUPROFEN 800 MG/1
800 TABLET, FILM COATED ORAL EVERY 8 HOURS
Status: DISCONTINUED | OUTPATIENT
Start: 2024-12-25 | End: 2024-12-26

## 2024-12-24 RX ORDER — CITRIC ACID/SODIUM CITRATE 334-500MG
30 SOLUTION, ORAL ORAL ONCE
Status: DISCONTINUED | OUTPATIENT
Start: 2024-12-24 | End: 2024-12-24 | Stop reason: HOSPADM

## 2024-12-24 RX ORDER — MEPERIDINE HYDROCHLORIDE 25 MG/ML
12.5 INJECTION INTRAMUSCULAR; INTRAVENOUS; SUBCUTANEOUS
Status: DISCONTINUED | OUTPATIENT
Start: 2024-12-24 | End: 2024-12-25

## 2024-12-24 RX ORDER — HYDROMORPHONE HYDROCHLORIDE 1 MG/ML
0.2 INJECTION, SOLUTION INTRAMUSCULAR; INTRAVENOUS; SUBCUTANEOUS
Status: DISCONTINUED | OUTPATIENT
Start: 2024-12-24 | End: 2024-12-25

## 2024-12-24 RX ORDER — MORPHINE SULFATE 0.5 MG/ML
INJECTION, SOLUTION EPIDURAL; INTRATHECAL; INTRAVENOUS
Status: COMPLETED | OUTPATIENT
Start: 2024-12-24 | End: 2024-12-24

## 2024-12-24 RX ORDER — DIPHENHYDRAMINE HCL 25 MG
25 TABLET ORAL EVERY 6 HOURS PRN
Status: DISCONTINUED | OUTPATIENT
Start: 2024-12-25 | End: 2024-12-25

## 2024-12-24 RX ADMIN — OXYCODONE 5 MG: 5 TABLET ORAL at 11:03

## 2024-12-24 RX ADMIN — FENTANYL CITRATE 10 MCG: 50 INJECTION, SOLUTION INTRAMUSCULAR; INTRAVENOUS at 16:36

## 2024-12-24 RX ADMIN — CEFAZOLIN 3 G: 1 INJECTION, POWDER, FOR SOLUTION INTRAMUSCULAR; INTRAVENOUS at 16:48

## 2024-12-24 RX ADMIN — MAGNESIUM SULFATE HEPTAHYDRATE 4 G: 4 INJECTION, SOLUTION INTRAVENOUS at 10:38

## 2024-12-24 RX ADMIN — ONDANSETRON 4 MG: 2 INJECTION INTRAMUSCULAR; INTRAVENOUS at 05:26

## 2024-12-24 RX ADMIN — BUPIVACAINE HYDROCHLORIDE IN DEXTROSE 1.6 ML: 7.5 INJECTION, SOLUTION SUBARACHNOID at 16:42

## 2024-12-24 RX ADMIN — OXYTOCIN 125 ML/HR: 10 INJECTION, SOLUTION INTRAMUSCULAR; INTRAVENOUS at 19:43

## 2024-12-24 RX ADMIN — SODIUM CHLORIDE, SODIUM GLUCONATE, SODIUM ACETATE, POTASSIUM CHLORIDE AND MAGNESIUM CHLORIDE 1000 ML: 526; 502; 368; 37; 30 INJECTION, SOLUTION INTRAVENOUS at 15:45

## 2024-12-24 RX ADMIN — PHENYLEPHRINE HYDROCHLORIDE 0.5 MCG/KG/MIN: 10 INJECTION INTRAVENOUS at 16:43

## 2024-12-24 RX ADMIN — SODIUM CHLORIDE, POTASSIUM CHLORIDE, SODIUM LACTATE AND CALCIUM CHLORIDE: 600; 310; 30; 20 INJECTION, SOLUTION INTRAVENOUS at 08:19

## 2024-12-24 RX ADMIN — SODIUM CHLORIDE, POTASSIUM CHLORIDE, SODIUM LACTATE AND CALCIUM CHLORIDE: 600; 310; 30; 20 INJECTION, SOLUTION INTRAVENOUS at 11:58

## 2024-12-24 RX ADMIN — ACETAMINOPHEN 1000 MG: 500 TABLET ORAL at 19:36

## 2024-12-24 RX ADMIN — LABETALOL HYDROCHLORIDE 20 MG: 5 INJECTION, SOLUTION INTRAVENOUS at 10:13

## 2024-12-24 RX ADMIN — ONDANSETRON 4 MG: 2 INJECTION INTRAMUSCULAR; INTRAVENOUS at 20:09

## 2024-12-24 RX ADMIN — HYDROMORPHONE HYDROCHLORIDE 1 MG: 1 INJECTION, SOLUTION INTRAMUSCULAR; INTRAVENOUS; SUBCUTANEOUS at 08:14

## 2024-12-24 RX ADMIN — MAGNESIUM SULFATE IN WATER 2 G/HR: 40 INJECTION, SOLUTION INTRAVENOUS at 11:01

## 2024-12-24 RX ADMIN — SODIUM CHLORIDE, POTASSIUM CHLORIDE, SODIUM LACTATE AND CALCIUM CHLORIDE 1000 ML: 600; 310; 30; 20 INJECTION, SOLUTION INTRAVENOUS at 10:17

## 2024-12-24 RX ADMIN — OXYCODONE HYDROCHLORIDE 10 MG: 10 TABLET ORAL at 19:35

## 2024-12-24 RX ADMIN — SODIUM CHLORIDE, SODIUM GLUCONATE, SODIUM ACETATE, POTASSIUM CHLORIDE AND MAGNESIUM CHLORIDE: 526; 502; 368; 37; 30 INJECTION, SOLUTION INTRAVENOUS at 16:27

## 2024-12-24 RX ADMIN — SODIUM CITRATE AND CITRIC ACID MONOHYDRATE 30 ML: 334; 500 SOLUTION ORAL at 15:47

## 2024-12-24 RX ADMIN — MORPHINE SULFATE 150 MCG: 0.5 INJECTION, SOLUTION EPIDURAL; INTRATHECAL; INTRAVENOUS at 16:36

## 2024-12-24 RX ADMIN — METOCLOPRAMIDE HYDROCHLORIDE 10 MG: 5 INJECTION INTRAMUSCULAR; INTRAVENOUS at 15:46

## 2024-12-24 RX ADMIN — FAMOTIDINE 20 MG: 10 INJECTION, SOLUTION INTRAVENOUS at 15:46

## 2024-12-24 RX ADMIN — OXYTOCIN 20 UNITS: 10 INJECTION, SOLUTION INTRAMUSCULAR; INTRAVENOUS at 17:17

## 2024-12-24 RX ADMIN — ONDANSETRON 4 MG: 2 INJECTION INTRAMUSCULAR; INTRAVENOUS at 16:48

## 2024-12-24 SDOH — ECONOMIC STABILITY: TRANSPORTATION INSECURITY
IN THE PAST 12 MONTHS, HAS THE LACK OF TRANSPORTATION KEPT YOU FROM MEDICAL APPOINTMENTS OR FROM GETTING MEDICATIONS?: NO

## 2024-12-24 SDOH — ECONOMIC STABILITY: TRANSPORTATION INSECURITY
IN THE PAST 12 MONTHS, HAS LACK OF RELIABLE TRANSPORTATION KEPT YOU FROM MEDICAL APPOINTMENTS, MEETINGS, WORK OR FROM GETTING THINGS NEEDED FOR DAILY LIVING?: NO

## 2024-12-24 ASSESSMENT — SOCIAL DETERMINANTS OF HEALTH (SDOH)
IN THE PAST 12 MONTHS, HAS THE ELECTRIC, GAS, OIL, OR WATER COMPANY THREATENED TO SHUT OFF SERVICE IN YOUR HOME?: PATIENT DECLINED
WITHIN THE PAST 12 MONTHS, YOU WORRIED THAT YOUR FOOD WOULD RUN OUT BEFORE YOU GOT THE MONEY TO BUY MORE: PATIENT DECLINED
WITHIN THE LAST YEAR, HAVE YOU BEEN AFRAID OF YOUR PARTNER OR EX-PARTNER?: NO
WITHIN THE PAST 12 MONTHS, THE FOOD YOU BOUGHT JUST DIDN'T LAST AND YOU DIDN'T HAVE MONEY TO GET MORE: PATIENT DECLINED
WITHIN THE LAST YEAR, HAVE YOU BEEN KICKED, HIT, SLAPPED, OR OTHERWISE PHYSICALLY HURT BY YOUR PARTNER OR EX-PARTNER?: NO
WITHIN THE LAST YEAR, HAVE TO BEEN RAPED OR FORCED TO HAVE ANY KIND OF SEXUAL ACTIVITY BY YOUR PARTNER OR EX-PARTNER?: NO
WITHIN THE LAST YEAR, HAVE YOU BEEN HUMILIATED OR EMOTIONALLY ABUSED IN OTHER WAYS BY YOUR PARTNER OR EX-PARTNER?: NO

## 2024-12-24 ASSESSMENT — PAIN DESCRIPTION - PAIN TYPE
TYPE: ACUTE PAIN

## 2024-12-24 ASSESSMENT — LIFESTYLE VARIABLES
TOTAL SCORE: 0
TOTAL SCORE: 0
HAVE PEOPLE ANNOYED YOU BY CRITICIZING YOUR DRINKING: NO
TOTAL SCORE: 0
DOES PATIENT WANT TO STOP DRINKING: NO
EVER FELT BAD OR GUILTY ABOUT YOUR DRINKING: NO
ALCOHOL_USE: NO
HAVE YOU EVER FELT YOU SHOULD CUT DOWN ON YOUR DRINKING: NO
EVER HAD A DRINK FIRST THING IN THE MORNING TO STEADY YOUR NERVES TO GET RID OF A HANGOVER: NO
CONSUMPTION TOTAL: INCOMPLETE

## 2024-12-24 ASSESSMENT — FIBROSIS 4 INDEX: FIB4 SCORE: 0.5

## 2024-12-24 ASSESSMENT — PATIENT HEALTH QUESTIONNAIRE - PHQ9
SUM OF ALL RESPONSES TO PHQ9 QUESTIONS 1 AND 2: 0
1. LITTLE INTEREST OR PLEASURE IN DOING THINGS: NOT AT ALL
2. FEELING DOWN, DEPRESSED, IRRITABLE, OR HOPELESS: NOT AT ALL

## 2024-12-24 ASSESSMENT — PAIN SCALES - GENERAL
PAIN_LEVEL: 2
PAINLEVEL: 7

## 2024-12-24 NOTE — PROGRESS NOTES
"BP 140s/70s  FHR Cat I  MgSO4 therapy ongoing.    MRCP:    1.  Mildly distended gallbladder with multiple stones present.  2.  No biliary dilation or common bile duct stone.  3.  Mild RIGHT hydronephrosis, likely hydronephrosis of pregnancy.  4.  Small volume ascites, of uncertain etiology.    Reviewed findings with patient, her , Dr. Telles and Dr. Jansen.  Shelby understands that delivery is the ultimate \"cure\" for her preeclampsia, but is not a definitive cure for her pancreatitis.      Plan :    Repeat Csection, leave GB in-situ per General Surgery and [facility] recc.  Postop: MgSO4 seizure prophylaxis, treat HTN as needed, fat-free clear liquid diet, serial labs, pay close attention to fluid/electrolyte mgmt.  "

## 2024-12-24 NOTE — DISCHARGE INSTRUCTIONS
Labor Instructions (37 - 39 weeks):  Call your physician or return to hospital if:  You have regular contractions that get progressively closer, longer and stronger.  Your water breaks (remember time and color).  You have bleeding like a period.  Your baby does not move enough to complete the daily kick counts (10 movements in 2 hours)  Your baby moves much less often than on the days before or you have not felt your baby move all day.            Post Operative Discharge Instructions:    1. DIET: Upon discharge from the hospital, you may resume your normal preoperative diet, unless specifically directed otherwise. Depending on how you are feeling and whether you have nausea or not, you may wish to stay with a bland diet for the first few days. However, you can advance this as quickly as you feel ready.    2. ACTIVITIES: After discharge from the hospital, you may resume full routine activities; however, there should be no heavy lifting (greater than 20 pounds or a bag of groceries) and no strenuous activities for at least 2 weeks. The duration may be longer, depending on your surgical procedure. Routine activities of daily living are acceptable. Activity level should be addressed at your post-op follow up appointment.    3. DRIVING: You may drive whenever you are off pain medications and are able to perform the activities needed to drive, i.e., turning, bending, twisting, etc.    4. BATHING: You may get the wound wet at any time after leaving the hospital. You may shower, but do not submerge in a bath for at least two weeks.  If you have wound dressings, they may come off after 48 hours.  If you have skin glue to the wound, this will fall off on its own, do not pick at it.  If you have Steri-Strips to the wound, these will fall off on their own, do not pick at them. You may trim the edges if needed.    5. BOWEL FUNCTION:   After surgery, it is not uncommon for patients to develop either frequent or loose stools after  meals. This usually corrects itself after a few days, to a few weeks. If this occurs, do not worry; this will resolve on its own.  Constipation is much more common than loose stools. The cause is the combination of pain medication and decreased activity level and possibly the nature of the surgical procedure performed. If you feel this is occurring, you may use an over-the-counter treatment such as MiraLAX (or Milk of Magnesia, Ex-Lax, Senokot, etc.) until the problem has resolved. Drink plenty of water and try to wean off narcotic pain medications as soon as is comfortable for you.    6. PAIN MEDICATION:   You will be given a prescription for pain medication at discharge. Please take these as directed. It is important to remember not to take medications on an empty stomach as this may cause nausea.  You may also take over the counter acetaminophen and/or NSAIDS (ibuprofen, Aleve, Advil, Motrin) per the package instructions.  You may also use ice to the wound to decrease pain and swelling. You may alternate 20 minutes on and 20 minutes off with the ice for the first 24-48 hours. Make sure you place a washcloth or towel between the ice pack and your skin.  Please note that narcotic pain medication cannot be refilled unless you are seen by a doctor. Make sure you call the office if you are running low on medication or if the dose you have been prescribed is not working well for you.    7.CALL THE [place] [facility] AT [phone] IF YOU HAVE:  (1) Fevers to more than 101F, (2) Unusual chest or leg pain, (3) Drainage or fluid from incision that may be foul smelling, increased tenderness or soreness at the wound or the wound edges are no longer together, redness or swelling at the incision site. Do not hesitate to call with any other questions.

## 2024-12-24 NOTE — PROGRESS NOTES
OB    36 2/7 wks, plan RCS, declines sterilization.  C/o 2 days worsening mid upper abd pain, nausea.  No known prior hx of liver disease, GB disease, or pancreatitis.    -162/58-96, just received IV labetalol, I just ordered MgSO4 sz prophylaxis.    ULTRASOUND:    1.  Cholelithiasis without additional sonographic evidence of acute cholecystitis  2.  Hepatomegaly  3.  Echogenic liver, compatible with fatty change versus fibrosis  4.  No ascites    Dr Telles (surgery) ordered STAT MRI to r/o bile duct obstruction.     12/23/24 13:20 12/24/24 04:50 12/24/24 07:10 12/24/24 08:05   WBC  13.5 (H)     Hemoglobin  11.9 (L)     Hematocrit  37.9     Platelet Count  353     Sodium  137     Potassium  3.9     Chloride  104     Co2  19 (L)     Anion Gap  14.0     Glucose  128 (H)     Bun  9     Creatinine  0.57     GFR (CKD-EPI)  124     Calcium  8.9     Correct Calcium  9.6     AST(SGOT)  313 (H)     ALT(SGPT)  237 (H)     Alkaline Phosphatase  297 (H)     Total Bilirubin  2.4 (H)     Albumin  3.1 (L)     Total Protein  7.1     Globulin  4.0 (H)     A-G Ratio  0.8     Lipase    2080 (H)   Amylase    717 (H)   Cholesterol,Tot  200 (H)     Triglycerides  144     HDL  64     LDL  107 (H)     Creatinine, Random Urine   273.00    Total Protein, Urine   98.7 (H)    Protein Creatinine Ratio   362 (H)    Urobilinogen, Urine 1.0      Color Dark Yellow      Character Clear      Specific Gravity 1.019      Ph 6.0      Glucose Negative      Ketones Trace !      Bilirubin Moderate !      Occult Blood Negative      Protein 30 !      Nitrite Negative      Leukocyte Esterase Moderate !      Micro Urine Req Microscopic      WBC 21-50 !      RBC 0-2      Epithelial Cells 11-20 !      Bacteria Few !      Urine Casts 0-2      Hepatitis A Virus Ab, IgM    Nonreactive   Hepatitis B Surface Antigen    Nonreactive   Hepatitis B Cors Ab,IgM    Nonreactive   Hepatitis C Antibody    Nonreactive     DX:    36 2/7 wk gestation  Prior Csection, plan  RCS  obesity  Preeclampsia with severe features (HTN, proteinuria, elevated liver enzymes)  Acute pancreatitis (hypertriglyceridemia has been ruled out as a cause, suspect gallstone pancreatitis)  R/o acute fatty liver of pregnancy    PLAN:    NPO.  Check ammonia, coags, LDH, uric acid.  STAT MRI to r/o bile duct obstruction.  Antihypertensives PRN.  MgSO4 seizure prophylaxis.  Prompt delivery by Csection after MRI done---if there is no bile duct obstruction Dr. Telles recc. Leaving gallbladder in-situ.  If there is bile duct obstruction consider combined procedure (repeat Csection and cholecystectomy).  Manage pancreatitis postop with clear liquid diet, supportive care, serial labs.     ADDENDUM:  Ammonia normal, no coagulopathy.  Doubt AFLP.  Awaiting MRI, then we'll proceed with Csection with or without cholecystectomy.      12/24/24 10:55   Uric Acid 5.2   LDH Total 346 (H)   Ammonia 19   INR 1.02   PT 13.4   APTT 26.7   Fibrinogen 576 (H)

## 2024-12-24 NOTE — CONSULTS
Surgical History and Physical    Date of Service: 2024    Requesting Physician: Hoang Swanson MD - ON    Reason for Consultation: Pancreatitis    HPI: This is a 29 y.o. female who is presenting with acute onset epigastric pain starting last night.  This was associated with nausea and vomiting.  She was seen in L&D triage.  Initial labs were notable for a leukocytosis, pan-elevation in her LFTs and markedly elevated lipase.  The patient reports resolved N/V, but the pain persists.    The patient is 36 weeks pregnant as well.  She was seen at bedside with Dr. Swanson and is apparently showing signs of pre-eclampsia.      PAST MEDICAL HISTORY:   Past Medical History:   Diagnosis Date    BMI 50.0-59.9, adult (HCC)        PAST SURGICAL HISTORY:   Past Surgical History:   Procedure Laterality Date    PRIMARY C SECTION Bilateral 2024    Procedure:  SECTION, PRIMARY;  Surgeon: Araseli Meehan M.D.;  Location: [facility];  Service: Labor and Delivery       ALLERGIES: Patient has no known allergies.       CURRENT MEDICATIONS:   Home Medications       Reviewed by Alexandra Fuentes R.N. (Registered Nurse) on 24 at 0720  Med List Status: Complete     Medication Last Dose Status   docusate sodium 100 MG Cap  Active   ibuprofen (MOTRIN) 800 MG Tab Not Taking Active   oseltamivir (TAMIFLU) 75 MG Cap  Active   Prenatal Multivit-Min-Fe-FA (PRE-NORM PO)  Active                  Audit from Redirected Encounters    **Home medications have not yet been reviewed for this encounter**          FAMILY HISTORY: family history includes Asthma in her brother; Hypertension in her mother.      SOCIAL HISTORY:  reports that she has never smoked. She has never used smokeless tobacco. She reports that she does not currently use alcohol. She reports that she does not use drugs.      Review of Systems:  Constitutional: Negative for fever, chills, weight loss, malaise/fatigue and diaphoresis.   HENT:  "Negative for hearing loss, ear pain, nosebleeds, congestion, sore throat, neck pain, tinnitus and ear discharge.    Eyes: Negative for blurred vision, double vision, photophobia, pain, discharge and redness.   Respiratory: Negative for cough, hemoptysis, sputum production, shortness of breath, wheezing and stridor.    Cardiovascular: Negative for chest pain, palpitations, orthopnea, claudication, leg swelling and PND.   Gastrointestinal: Negative for heartburn, nausea, vomiting, abdominal pain, diarrhea, constipation, blood in stool and melena.   Genitourinary: Negative for dysuria, urgency, frequency, hematuria and flank pain.   Musculoskeletal: Negative for myalgias, back pain, joint pain and falls.   Skin: Negative for itching and rash.  Neurological: Negative for dizziness, tingling, tremors, sensory change, speech change, focal weakness, seizures, loss of consciousness, weakness and headaches.   Endo/Heme/Allergies: Negative for environmental allergies and polydipsia. Does not bruise/bleed easily.   Psychiatric/Behavioral: Negative for depression, suicidal ideas, hallucinations, memory loss and substance abuse. The patient is not nervous/anxious and does not have insomnia.    Physical Exam:  BP (!) 149/77   Pulse 65   Temp 36.4 °C (97.6 °F) (Temporal)   Resp 16   Ht 1.626 m (5' 4\")   Wt (!) 154 kg (340 lb)   SpO2 97%   Vitals:    12/24/24 0924   BP: (!) 149/77   Pulse: 65   Resp:    Temp:    SpO2:      GENERAL:  Morbidly obese appearing and in mild acute distress due to pain  CHEST:  Lungs are clear to auscultation bilaterally.  No masses, lesions, or signs of trauma were noted.     CARDIOVASCULAR:  Regular rate and rhythm.  No murmurs appreciated.  No JVD.  Palpable pulses present in all four extremities.    ABDOMEN:  Soft, body habitus precludes an effective physical exam, tender over the epigastria, palpable gravid uterus.  Non-tympanitic.   MUSCULOSKELETAL: Normal range of motion x4 extremities.  "   SKIN:  Warm and well perfused. No rashes.  NEUROLOGIC:  Alert and oriented. Cranial nerves II through XII are grossly intact. Motor and sensory exams are normal in all four extremities. Motor and sensory reflexes are 2+ and symmetric with bilateral plantar responses.  PSYCHIATRIC: Affect and mood is appropriate for age and condition.    Labs:  Recent Labs     12/24/24 0450   WBC 13.5*   RBC 4.76   HEMOGLOBIN 11.9*   HEMATOCRIT 37.9   MCV 79.6*   MCH 25.0*   MCHC 31.4*   RDW 47.2   PLATELETCT 353   MPV 9.3     Recent Labs     12/24/24 0450   SODIUM 137   POTASSIUM 3.9   CHLORIDE 104   CO2 19*   GLUCOSE 128*   BUN 9   CREATININE 0.57   CALCIUM 8.9         Recent Labs     12/24/24 0450   ASTSGOT 313*   ALTSGPT 237*   TBILIRUBIN 2.4*   ALKPHOSPHAT 297*   GLOBULIN 4.0*       Radiology:  US-RUQ   Final Result         1.  Cholelithiasis without additional sonographic evidence of acute cholecystitis   2.  Hepatomegaly   3.  Echogenic liver, compatible with fatty change versus fibrosis      UP-[id number]-L/O    (Results Pending)       Assessment/Plan:   1) Acute pancreatitis:    Suspect gallstone etiology.  It may be the stone has already passed as her common bile duct was only 4.5mm on the ultrasound.  Given the high risk nature of the concomitant pregnancy, will try and obtain a stat MRCP to really declinate whether the common bile duct is clear of stones.  This has been ordered.  The pancreatitis has been set in motion, there is no need for an emergency cholecystectomy as this will not alter the course of the pancreatitis.  What is more important is to ensure there is no retained gallstone within the common bile duct.  No evidence of ascending cholangitis at this time, no recommendation for antibiotics for this.    If the MRCP does not show choledocholithiasis, will continue with supportive care for the pancreatitis - clear liquid diet, IV fluids, daily labs.    If the MRCP does show choledocholithiasis, she will need a  GI consult and ERCP for stone extraction.      In either instance the timing of the cholecystectomy will be dependent on the chemical and clinical resolution of the pancreatitis.    Should issues with her pre-eclampsia determine delivery is needed, this is in the purview of Dr. Swanson.      Dr. Swanson and RN updated at bedside as to the plan of care.    -Daily CBC/CMP/Lipase - ordered  -1L LR IV fluid bolus - ordered  -NPO sips with meds for now - ordered  -STAT MRCP  - ordered    Aggregated care time spent evaluating, reassessing, reviewing documentation, providing care, and managing this patient exclusive of procedures: 45 minutes  ____________________________________   Casey Telles MD, FACS   JRU / NTS     DD: 12/24/2024   DT: 10:14 AM

## 2024-12-24 NOTE — PROGRESS NOTES
EDC -25 EGA -36.2 weeks     0210- Pt arrived to labor and delivery for Upper abd pain. Pt placed in room LDA 5.   0214- External monitors in place X2. Pt reports good FM. No complaints of contractions, ROM or vaginal bleeding. FOB at bedside. POC discussed with pt and family members, all questions answered.    4908- Report called to Dr. Palacios. POC discussed. Orders received.   4291- Lab results read to Dr. Palacios via phone. Orders received.

## 2024-12-24 NOTE — ED PROVIDER NOTES
"IUP at 36w2d.  Nausea/vomiting.  Abdominal pain.    S:  Patient is reporting an improvement in her nausea/vomiting but states that her RUQ and abdominal pain has not improved.  She is asking for pain medication.  Discussed the results of the abdominal u/s and the fact that her liver enzymes are elevated and that she will need to be admitted and that I am having general surgery evaluate her.  She will need to be NPO for now and once the additional studies are available, she may need to be delivered. She is also being evaluated for preeclampsia.  She reports excellent fetal movement. She denies vaginal bleeding, vaginal discharge, LOF, dysuria, and regular/painful uterine cramping/contractions.      O: BP (!) 147/72   Pulse (!) 59   Temp 36.4 °C (97.6 °F) (Temporal)   Resp 16   Ht 1.626 m (5' 4\")   Wt (!) 154 kg (340 lb)   SpO2 97%     A, A, and O  x 3 NAD    Abdomen:  soft and gravid and morbidly obese.  Tender to palpation in the RUQ worse upon inspiration and with movement.    Extremities: no c/c/e    TOCO: quiet and without contractions.    EFM: category I tracing.  No contractions.     Latest Reference Range & Units 12/24/24 04:50   WBC 4.8 - 10.8 K/uL 13.5 (H)   RBC 4.20 - 5.40 M/uL 4.76   Hemoglobin 12.0 - 16.0 g/dL 11.9 (L)   Hematocrit 37.0 - 47.0 % 37.9   MCV 81.4 - 97.8 fL 79.6 (L)   MCH 27.0 - 33.0 pg 25.0 (L)   MCHC 32.2 - 35.5 g/dL 31.4 (L)   RDW 35.9 - 50.0 fL 47.2   Platelet Count 164 - 446 K/uL 353   MPV 9.0 - 12.9 fL 9.3   Neutrophils-Polys 44.00 - 72.00 % 87.30 (H)   Neutrophils (Absolute) 1.82 - 7.42 K/uL 11.78 (H)   Lymphocytes 22.00 - 41.00 % 9.20 (L)   Lymphs (Absolute) 1.00 - 4.80 K/uL 1.24   Monocytes 0.00 - 13.40 % 3.00   Monos (Absolute) 0.00 - 0.85 K/uL 0.40   Eosinophils 0.00 - 6.90 % 0.00   Eos (Absolute) 0.00 - 0.51 K/uL 0.00   Basophils 0.00 - 1.80 % 0.10   Baso (Absolute) 0.00 - 0.12 K/uL 0.02   Immature Granulocytes 0.00 - 0.90 % 0.40   Immature Granulocytes (abs) 0.00 - 0.11 " K/uL 0.06   Nucleated RBC 0.00 - 0.20 /100 WBC 0.00   NRBC (Absolute) K/uL 0.00   Sodium 135 - 145 mmol/L 137   Potassium 3.6 - 5.5 mmol/L 3.9   Chloride 96 - 112 mmol/L 104   Co2 20 - 33 mmol/L 19 (L)   Anion Gap 7.0 - 16.0  14.0   Glucose 65 - 99 mg/dL 128 (H)   Bun 8 - 22 mg/dL 9   Creatinine 0.50 - 1.40 mg/dL 0.57   GFR (CKD-EPI) >60 mL/min/1.73 m 2 124   Calcium 8.5 - 10.5 mg/dL 8.9   Correct Calcium 8.5 - 10.5 mg/dL 9.6   AST(SGOT) 12 - 45 U/L 313 (H)   ALT(SGPT) 2 - 50 U/L 237 (H)   Alkaline Phosphatase 30 - 99 U/L 297 (H)   Total Bilirubin 0.1 - 1.5 mg/dL 2.4 (H)   Albumin 3.2 - 4.9 g/dL 3.1 (L)   Total Protein 6.0 - 8.2 g/dL 7.1   Globulin 1.9 - 3.5 g/dL 4.0 (H)   A-G Ratio g/dL 0.8   (H): Data is abnormally high  (L): Data is abnormally low     2024 3:40 AM     HISTORY/REASON FOR EXAM:  Abdominal pain     TECHNIQUE/EXAM DESCRIPTION AND NUMBER OF VIEWS:  Real-time sonography of the liver and biliary tree.     COMPARISON: None     FINDINGS:     The liver is normal in contour. There is no evidence of solid mass lesion. The liver measures 20.98 cm. The liver appears echogenic.     Mobile gallstones are present.  The gallbladder wall thickness measures 2.90 mm. There is no pericholecystic fluid.     The common duct measures 4.80 mm.     The visualized pancreas is unremarkable.     The visualized aorta is normal in caliber.     Intrahepatic IVC is patent.     The portal vein is patent with hepatopetal flow. The MPV measures 0.86 cm cm.     The right kidney measures 13.93 cm.     There is no ascites.     IMPRESSION:        1.  Cholelithiasis without additional sonographic evidence of acute cholecystitis  2.  Hepatomegaly  3.  Echogenic liver, compatible with fatty change versus fibrosis    A/P: 28 yo  at 36w2d who has cholelithiasis with possible cholecystitis and who will now be admitted for observation to rule out preeclampsia.    NPO.  IV antiemetics.  Symptoms cares for cholelithiasis with  cholecystitis.  Await results of protein creatinine ratio.  General surgery consultation - Dr. Telles has been consulted.  Elevated liver enzymes - Additional labs - amylase/lipase and hepatitis panel.  IVF hydration.  IV dilaudid.  Dr. Swanson is aware of this admission.  Proceed to delivery if needed - will need a RLTCS.  GBS is pending.  Recent influenza diagnosis - s/p Tamiflu treatment course.

## 2024-12-24 NOTE — PROGRESS NOTES
1005 Report received from Alexandra GARSIA.   [id number] Dr. Telles and Dr. Swanson at bedside  Orders received. Patient denies h/a and blurry vision.  1257 Patient taken to MRI  1716 Delivery of viable baby boyFederico. Delivery of placenta.   1900 Report given to Omayra SR RN.

## 2024-12-24 NOTE — H&P
DATE OF ADMISSION:  2024     ADMISSION DIAGNOSES:  1.  Intrauterine pregnancy at 36+2 weeks' gestation, nausea and vomiting.  2.  Abdominal pain.  3.  Rule out preeclampsia with severe features.  4.  Gallstones with gallstone pancreatitis.     HISTORY OF PRESENT ILLNESS:  The patient is a 29-year-old  2, para   1-0-0-1 at 36+2 weeks' gestation based upon her LMP of 2024 and   consistent with an 8+3 week ultrasound performed on 2024.  The patient   presented to labor and delivery in the early morning of 2024 with the   report of nausea and vomiting, which was persistent as well as abdominal pain   that was primarily located in the right upper quadrant, but band across her   entire abdomen.  The patient states at first, she thought these were [place]   Resendiz contractions, but then realized that the pain was basically persistent   and not coming and going like a contraction.  She reports excellent fetal   movement.  She denies vaginal bleeding, leakage of fluid, dysuria, and regular   and painful uterine cramping and contractions.  While in labor and delivery,   she underwent lab workup as well as a right upper quadrant abdominal   ultrasound.  The labs demonstrated that she has gallstones without dilated   common bile duct.  The gallbladder wall thickness was also within normal   limits.  The patient was informed about the findings on gallbladder ultrasound   as well as elevated liver functions, which were in the 313 for the AST and   237 for ALT respectively.  Given these findings, I recommended that she be   admitted to labor and delivery for antepartum admission and consultation with   general surgery.  Additional laboratory studies were ordered, amylase, lipase   and hepatitis panel.     These laboratory studies for the amylase and lipase were significantly   Elevated (lipase at 2080 and amylase at 717).  Dr. Swanson has been informed of the patient's admission and will also see  the patient and Dr. Telles of General Surgery will see the patient in consultation.     Prenatal care:  The patient is a transfer of care from Dr. Araseli Meehan to Dr. Fallon Collado, first visit on 2024 at 8+3 weeks' gestation, total   visits 7.  Total weight gain 15 pounds.  Third trimester blood pressures   143-144/80-89.     PRENATAL LABS:  Blood type O positive, rubella immune, RPR nonreactive,   hepatitis B surface antigen negative.  Hep C viral antibody negative.  NIPT   negative for trisomies 21, 18, and 13, male fetus.  MSAFP for open spina   bifida risk is screen negative.  One-hour GCT nonreactive.  GC chlamydia   negative.     OBSTETRIC ULTRASOUND:  1.  2024 at 8+2 weeks gestation SUZANNA 2025.  2.  07/10/2024 at [facility] estimated gestational age 12 weeks 3 days.  SUZANNA   2025.  Cardiac activity present.  Anterior placenta.  Three-vessel cord.    Single viable intrauterine pregnancy at 12+3 weeks' gestation.  3.  2024 at 20+2 weeks' gestation with [facility] SUZANNA 2025.  Fetal   heart rate 157 beats per minute.  Three-vessel cord.  SHEBA 15.6.  Male fetus.    Intraventricular septal defect noted.  4.  On 10/02/2024 at 24+3 gestation, SUZANNA 2025.  Breech presentation.    Anterior placenta.  Normal SHEBA.  Unable to visualize the fetal heart views.  5.  On 10/30/2024 at 28+5 weeks' gestation.  Vertex presentation.  Anterior   placenta.  SHEBA 12.7 cm.  Limited fetal heart views, but appears normal.  6.  2024 at 33+4 week gestation, SUZANNA 01/15/2025.  Fetal heart rate 157   beats per minute.  Fetal movement visualized.  Vertex presentation.  Anterior   placenta, 3-vessel cord noted.  Suspected small muscular VSD.     PAST OBSTETRIC HISTORY:  2024 at 40 weeks' gestation, 7 pound 15 ounce   male infant via primary  for arrest of dilation.     PAST MEDICAL HISTORY:  Morbid obesity with a BMI of 58, borderline   pre-existing hypertension.  She has a recent  influenza diagnosis and was   treated with Tamiflu.     ALLERGIES:  No known drug allergies.     SOCIAL HISTORY:  She is .  She denies alcohol, tobacco or drugs of   abuse.     SURGICAL HISTORY:  Primary .     GYNECOLOGIC HISTORY:  She denies STDs, PID, abnormal Paps or HSV.     PHYSICAL EXAMINATION:  VITAL SIGNS:  On admission, blood pressure 147/72, pulse 59, temperature 97.6,   respiratory rate 16.  GENERAL:  Alert, awake and oriented x3, in no acute distress.  ABDOMEN:  Soft and gravid.  Morbidly obese.  Tender to palpation in the right   upper quadrant worse upon inspiration and with movement.  EXTREMITIES:  No clubbing, cyanosis or edema.  Normal reflexes.  No clonus.     LABORATORY DATA:  [place], quiet without contractions.  External fetal monitoring   category 1 tracing, reactive, without decels.  White count 13.5, hemoglobin   11.9, hematocrit 37.9, platelets 353.  Sodium 137, potassium 3.9, chloride   104, CO2 19, glucose 128, BUN 9, creatinine 0.57, calcium 8.9.  , ALT   237, alkaline phosphatase 297, albumin 3.1, total bilirubin 2.4.     Right upper quadrant ultrasound demonstrates:  1.  Cholelithiasis without additional sonographic evidence of acute   cholecystitis.  2.  Hepatomegaly.  3.  Echogenic liver compatible with fatty changes versus fibrosis.  Aorta is   normal in caliber.  Visualized pancreas is unremarkable.  Common bile duct is   4.8 cm.  Gallbladder wall thickness measures 2.9 cm.     Lipase - 2080.  Amylase - 717.     ASSESSMENT AND PLAN:  A 29-year-old  2, para 1-0-0-1 at 36+2 weeks'   gestation based upon her LMP of 2024 and consistent with an 8 plus   2-week ultrasound performed on 2024, who is admitted with cholelithiasis   and likely cholecystitis as well as gallstone pancreatitis, who was admitted   for antepartum admission to also rule out preeclampsia with severe features,   who has a history of a previous  x1 and is morbidly obese  with a BMI   of 58.     PLAN:  The patient will be admitted to labor and delivery antepartum service.    General Surgery consultation has been obtained and determination for   additional plans will be made with Dr. Telles.  The patient will likely need   an MRCP to assess for risk of choledocholithiasis.  The patient will need   cholecystectomy at some point.  The patient will receive IV fluids, IV   antiemetics and IV pain management.  She will also undergo IV hydration.  She   will have NST q. shift.  Dr. Swanson is on service for the next 48 hours and   will also obtain an [facility] consultation with the [facility].  The   patient has been made aware of the plan for admission.  If we need to proceed   to delivery, she will need a repeat .  GBS is pending.        ______________________________  MD LAURYN Concepcion/AYUSH    DD:  2024 10:57  DT:  2024 12:21    Job#:  [id number]    CC:GERARDO LUBIN MD

## 2024-12-24 NOTE — ANESTHESIA PREPROCEDURE EVALUATION
Case: [id number] Date/Time: 24 1600    Procedure:  SECTION, REPEAT (Abdomen)    Anesthesia type: Spinal    Pre-op diagnosis: PANCREATITIS, CHOLESTASIS, PRE-E    Location: [facility] OR 02 / SURGERY LABOR AND DELIVERY    Surgeons: Hoang Swanson M.D.            30 yo F  at 36w2d w/ BMI 58, admitted with influenza A, gallstone pancreatitis and pre-eclampsia      Relevant Problems   No relevant active problems       Physical Exam    Airway   Mallampati: IV  TM distance: >3 FB  Neck ROM: full       Cardiovascular - normal exam  Rhythm: regular  Rate: normal  (-) murmur     Dental - normal exam           Pulmonary - normal exam  Breath sounds clear to auscultation     Abdominal    Neurological - normal exam                   Anesthesia Plan    ASA 4 (BMI 58)   ASA physical status 4 criteria: preeclampsia with severe features complicated by HELLP or other adverse event\    Plan - spinal   Neuraxial block will be primary anesthetic                Postoperative Plan: Postoperative administration of opioids is intended.    Pertinent diagnostic labs and testing reviewed    Informed Consent:    Anesthetic plan and risks discussed with patient.    Use of blood products discussed with: patient whom consented to blood products.

## 2024-12-24 NOTE — CONSULTS
Requesting Physician: Hoang Kruse MD    Thank you for your consultation request on Ms. Shelby Calderon I had a chance to see her at the [facility] in [place] on 24. As you recall, Ms. Calderon is a 29 y.o. y/o, female, G 2 P 1 with an SUZANNA Estimated Date of Delivery: 25 and an EGA 36w2d  who was admitted for epigastric pain with N/V.  Her evaluation releaved elevated BP's with proteinuria and elevated LFT's. Slso galls one pancreatitis was noted with elevate amylase and lipase. U/S showed gallstone, but di not appear to be blocking the CBD.  She was seen by surgery and will get an MRI. She was started on magnesium sulfate for preeclampsia with severe features.    Past OB History:   OB History    Para Term  AB Living   2 1 1     1   SAB IAB Ectopic Molar Multiple Live Births           0 1      # Outcome Date GA Lbr Johan/2nd Weight Sex Type Anes PTL Lv   2 Current            1 Term 24 40w0d  7 lb 15 oz M CS-LTranv EPI N ALYLSON      Complications: Failure to Progress in Second Stage       Past Gyn History: Denies any history of sexually transmitted disease    Past Medical History:   Past Medical History:   Diagnosis Date    BMI 50.0-59.9, adult (HCC)         Past Surgical History:   Past Surgical History:   Procedure Laterality Date    PRIMARY C SECTION Bilateral 2024    Procedure:  SECTION, PRIMARY;  Surgeon: Araseli Meehan M.D.;  Location: SURGERY LABOR AND DELIVERY;  Service: Labor and Delivery       Allergies:   Patient has no known allergies.    Medications:     Current Facility-Administered Medications:     ondansetron (Zofran) syringe/vial injection 4 mg, 4 mg, Intravenous, Q4HRS PRN, Kristin Buckley M.D., 4 mg at 24 0526    HYDROmorphone (Dilaudid) injection 1 mg, 1 mg, Intravenous, Q2HRS PRN, Kristin Buckley M.D., 1 mg at 24 0814    lactated ringers infusion, , Intravenous, Continuous, Kristin Buckley M.D., Stopped at 24  1016    metoclopramide (Reglan) injection 10 mg, 10 mg, Intravenous, Q6HRS PRN, Kristin Buckley M.D.    labetalol (Normodyne/Trandate) injection 20-80 mg, 20-80 mg, Intravenous, Q10 MIN PRN, 20 mg at 12/24/24 1013 **OR** hydrALAZINE (Apresoline) injection 5-10 mg, 5-10 mg, Intravenous, Q20MIN PRN **OR** NIFEdipine IR (Procardia) capsule 10 mg, 10 mg, Oral, Q20MIN PRN, Kristin Buckley M.D.    oxyCODONE immediate-release (Roxicodone) tablet 5 mg, 5 mg, Oral, Q4HRS PRN, 5 mg at 12/24/24 1103 **OR** oxyCODONE immediate-release (Roxicodone) tablet 10 mg, 10 mg, Oral, Q4HRS PRN, Javier Telles M.D.    lactated ringers infusion, , Intravenous, Continuous, Hoang Swanson M.D., Last Rate: 50 mL/hr at 12/24/24 1158, New Bag at 12/24/24 1158    calcium GLUConate injection 1 g, 1 g, Intravenous, Once PRN, Hoang Swanson M.D.    [COMPLETED] magnesium sulfate IVPB premix 4 g, 4 g, Intravenous, Once, Stopped at 12/24/24 1058 **FOLLOWED BY** magnesium sulfate 40 g/1000mL infusion, 2 g/hr, Intravenous, Continuous, Hoang Swanson M.D., Last Rate: 50 mL/hr at 12/24/24 1101, 2 g/hr at 12/24/24 1101    sodium citrate-citric acid (Bicitra) 500-334 MG/5ML solution 30 mL, 30 mL, Oral, Once, Leoncio Andujar MD, PhD    famotidine (Pepcid) injection 20 mg, 20 mg, Intravenous, Once, Leoncio Andujar MD, PhD    metoclopramide (Reglan) injection 10 mg, 10 mg, Intravenous, Once, Leoncio Andujar MD, PhD    Tob/Etoh   Tobacco Use: Low Risk  (12/19/2024)    Patient History     Smoking Tobacco Use: Never     Smokeless Tobacco Use: Never     Passive Exposure: Not on file   ,   Alcohol Use: Not on file        Family History: Negative for any birth defects, chromosomal abnormalities, or mental retardation in the  paternal or maternal history    Labs:  Admission on 12/24/2024   Component Date Value Ref Range Status    WBC 12/24/2024 13.5 (H)  4.8 - 10.8 K/uL Final    RBC 12/24/2024 4.76  4.20 - 5.40 M/uL Final    Hemoglobin  12/24/2024 11.9 (L)  12.0 - 16.0 g/dL Final    Hematocrit 12/24/2024 37.9  37.0 - 47.0 % Final    MCV 12/24/2024 79.6 (L)  81.4 - 97.8 fL Final    MCH 12/24/2024 25.0 (L)  27.0 - 33.0 pg Final    MCHC 12/24/2024 31.4 (L)  32.2 - 35.5 g/dL Final    RDW 12/24/2024 47.2  35.9 - 50.0 fL Final    Platelet Count 12/24/2024 353  164 - 446 K/uL Final    MPV 12/24/2024 9.3  9.0 - 12.9 fL Final    Neutrophils-Polys 12/24/2024 87.30 (H)  44.00 - 72.00 % Final    Lymphocytes 12/24/2024 9.20 (L)  22.00 - 41.00 % Final    Monocytes 12/24/2024 3.00  0.00 - 13.40 % Final    Eosinophils 12/24/2024 0.00  0.00 - 6.90 % Final    Basophils 12/24/2024 0.10  0.00 - 1.80 % Final    Immature Granulocytes 12/24/2024 0.40  0.00 - 0.90 % Final    Nucleated RBC 12/24/2024 0.00  0.00 - 0.20 /100 WBC Final    Neutrophils (Absolute) 12/24/2024 11.78 (H)  1.82 - 7.42 K/uL Final    Includes immature neutrophils, if present.    Lymphs (Absolute) 12/24/2024 1.24  1.00 - 4.80 K/uL Final    Monos (Absolute) 12/24/2024 0.40  0.00 - 0.85 K/uL Final    Eos (Absolute) 12/24/2024 0.00  0.00 - 0.51 K/uL Final    Baso (Absolute) 12/24/2024 0.02  0.00 - 0.12 K/uL Final    Immature Granulocytes (abs) 12/24/2024 0.06  0.00 - 0.11 K/uL Final    NRBC (Absolute) 12/24/2024 0.00  K/uL Final    Sodium 12/24/2024 137  135 - 145 mmol/L Final    Potassium 12/24/2024 3.9  3.6 - 5.5 mmol/L Final    Comment: The hemolysis index of the specimen exceeds the allowed tolerance for the  test. Result may be affected.?Specimen recollection is recommended to  confirm the result.      Chloride 12/24/2024 104  96 - 112 mmol/L Final    Co2 12/24/2024 19 (L)  20 - 33 mmol/L Final    Anion Gap 12/24/2024 14.0  7.0 - 16.0 Final    Glucose 12/24/2024 128 (H)  65 - 99 mg/dL Final    Bun 12/24/2024 9  8 - 22 mg/dL Final    Creatinine 12/24/2024 0.57  0.50 - 1.40 mg/dL Final    Calcium 12/24/2024 8.9  8.5 - 10.5 mg/dL Final    Correct Calcium 12/24/2024 9.6  8.5 - 10.5 mg/dL Final     AST(SGOT) 12/24/2024 313 (H)  12 - 45 U/L Final    Comment: The hemolysis index of the specimen exceeds the allowed tolerance for the  test. Result may be affected.?Specimen recollection is recommended to  confirm the result.      ALT(SGPT) 12/24/2024 237 (H)  2 - 50 U/L Final    Alkaline Phosphatase 12/24/2024 297 (H)  30 - 99 U/L Final    Total Bilirubin 12/24/2024 2.4 (H)  0.1 - 1.5 mg/dL Final    Albumin 12/24/2024 3.1 (L)  3.2 - 4.9 g/dL Final    Total Protein 12/24/2024 7.1  6.0 - 8.2 g/dL Final    Globulin 12/24/2024 4.0 (H)  1.9 - 3.5 g/dL Final    A-G Ratio 12/24/2024 0.8  g/dL Final    GFR (CKD-EPI) 12/24/2024 124  >60 mL/min/1.73 m 2 Final    Comment: Estimated Glomerular Filtration Rate is calculated using  race neutral CKD-EPI 2021 equation per NKF-ASN recommendations.      Total Protein, Urine 12/24/2024 98.7 (H)  0.0 - 15.0 mg/dL Final    Creatinine, Random Urine 12/24/2024 273.00  mg/dL Final    Comment: Reference ranges have not been established for this specimen type.  Result interpretation should include consideration of patient's  medical condition and clinical presentation.      Protein Creatinine Ratio 12/24/2024 362 (H)  10 - 107 mg/g Final    Amylase 12/24/2024 717 (H)  20 - 103 U/L Final    Lipase 12/24/2024 2080 (H)  11 - 82 U/L Final    Results obtained by dilution.    Holding Tube - Bb 12/24/2024 DONE   Final    Syphilis, Treponemal Qual 12/24/2024 Nonreactive  Non-Reactive Final    Hepatitis B Surface Antigen 12/24/2024 Nonreactive  Non-Reactive Final    Comment: It has been reported that certain assays will not detect all HBV mutants.  If acute or chronic HBV infection is suspected and the HBsAg result is  negative, it is recommended that other serological markers be tested to  confirm the HBsAg nonreactivity.  HBsAg test results should always be  assessed in conjunction with the patient's medical history, clinical  examination, and other findings.    Note: Assay performance  characteristics have not been established when the  [employer] HBsAg II assay is used in conjunction with other manufacturers'  assays for specific HBV serological markers. Assay performance  characteristics have not been established for testing of newborns.        Hepatitis B Cors Ab,IgM 12/24/2024 Nonreactive  Non-Reactive Final    Comment: The Roche HBc IgM is a diagnostic test for the qualitative determination of  IgM antibodies to the hepatitis B core antigen in human serum and plasma.  The results should be used and interpreted only in the context of the  overall clinical picture. A negative test result does not exclude the  possibility of exposure to hepatitis B virus.  Note: Assay performance characteristics have not been established in  patients under the age of 21, pregnant women, or in populations of  immunocompromised or immunosuppressed patients.      Hepatitis A Virus Ab, IgM 12/24/2024 Nonreactive  Non-Reactive Final    Comment: The Roche HAV IgM assay is a diagnostic immunoassay for the qualitative  determination of IgM response to the hepatitis A virus in human serum and  plasma. The results should be used and interpreted only in the context of  the overall clinical picture. A negative test result does not exclude the  possibility of exposure to hepatitis A virus.  Note: Assay performance characteristics have not been established for  immunocompromised or immunosuppressed patients.      Hepatitis C Antibody 12/24/2024 Nonreactive  Non-Reactive Final    Comment: The Roche anti-HCV is a diagnostic test for the qualitative determination of  antibodies to the hepatitis C virus in human serum and plasma. The results  should be used and interpreted only in the context of the overall clinical  picture. A negative test result does not exclude the possibility of exposure  to hepatitis C virus.  Note: Assay performance characteristics have not been established in  populations of immunocompromised or  immunosuppressed patients.      Cholesterol,Tot 12/24/2024 200 (H)  100 - 199 mg/dL Final    Triglycerides 12/24/2024 144  0 - 149 mg/dL Final    HDL 12/24/2024 64  >=40 mg/dL Final    LDL 12/24/2024 107 (H)  <100 mg/dL Final    Magnesium 12/24/2024 3.3 (H)  1.5 - 2.5 mg/dL Final    Ammonia 12/24/2024 19  11 - 45 umol/L Final    PT 12/24/2024 13.4  12.0 - 14.6 sec Final    INR 12/24/2024 1.02  0.87 - 1.13 Final    Comment: INR - Non-therapeutic Reference Range: 0.87-1.13  INR - Therapeutic Reference Range: 2.0-4.0      APTT 12/24/2024 26.7  24.7 - 36.0 sec Final    Fibrinogen 12/24/2024 576 (H)  215 - 460 mg/dL Final    LDH Total 12/24/2024 346 (H)  107 - 266 U/L Final    Comment: The hemolysis index of the specimen exceeds the allowed tolerance for the  test.  Result may be affected.  Specimen recollection is recommended to  confirm the result.      Uric Acid 12/24/2024 5.2  1.9 - 8.2 mg/dL Final   Admission on 12/23/2024, Discharged on 12/23/2024   Component Date Value Ref Range Status    POC Color 12/23/2024 Su   Final    POC Appearance 12/23/2024 Slightly Cloudy (A)   Final    POC Glucose 12/23/2024 Negative  Negative mg/dL Final    POC Ketones 12/23/2024 Trace (A)  Negative mg/dL Final    POC Specific Gravity 12/23/2024 1.020  1.005 - 1.030 Final    POC Blood 12/23/2024 Negative  Negative Final    POC Urine PH 12/23/2024 6.0  5.0 - 8.0 Final    POC Protein 12/23/2024 30 (A)  Negative mg/dL Final    Bilirubin 12/23/2024 Moderate (A)  Negative Final    Urobilinogen, Urine 12/23/2024 1.0  Negative Final    POC Nitrites 12/23/2024 Negative  Negative Final    POC Leukocyte Esterase 12/23/2024 Trace (A)  Negative Final    Color 12/23/2024 Dark Yellow   Final    Character 12/23/2024 Clear   Final    Specific Gravity 12/23/2024 1.019  <1.035 Final    Ph 12/23/2024 6.0  5.0 - 8.0 Final    Glucose 12/23/2024 Negative  Negative mg/dL Final    Ketones 12/23/2024 Trace (A)  Negative mg/dL Final    Protein 12/23/2024 30  (A)  Negative mg/dL Final    Bilirubin 12/23/2024 Moderate (A)  Negative Final    Urobilinogen, Urine 12/23/2024 1.0  <=1.0 EU/dL Final    Nitrite 12/23/2024 Negative  Negative Final    Leukocyte Esterase 12/23/2024 Moderate (A)  Negative Final    Occult Blood 12/23/2024 Negative  Negative Final    Micro Urine Req 12/23/2024 Microscopic   Final    WBC 12/23/2024 21-50 (A)  /hpf Final    Comment: Female  <12 Yr 0-2  >12 Yr 0-5  Male   0-2      RBC 12/23/2024 0-2  0 - 2 /hpf Final    Bacteria 12/23/2024 Few (A)  None /hpf Final    Epithelial Cells 12/23/2024 11-20 (A)  0 - 5 /hpf Final    Please note new reference range effective 10/23/2024.    Urine Casts 12/23/2024 0-2  0 - 2 /lpf Final   Admission on 12/19/2024, Discharged on 12/19/2024   Component Date Value Ref Range Status    Influenza virus A RNA 12/19/2024 POSITIVE (A)  Negative Final    Influenza virus B, PCR 12/19/2024 Negative  Negative Final    RSV, PCR 12/19/2024 Negative  Negative Final    SARS-CoV-2 by PCR 12/19/2024 NotDetected   Final    Comment: [facility] providers: PLEASE REFER TO DE-ESCALATION AND RETESTING PROTOCOL  on [email or web address]    **The [employer] GeneXpert Xpress SARS-CoV-2 RT-PCR Test has been made  available for use under the Emergency Use Authorization (EUA) only.      SARS-CoV-2 Source 12/19/2024 NP Swab   Final    POC Color 12/19/2024 Dark yellow   Final    POC Appearance 12/19/2024 Clear   Final    POC Glucose 12/19/2024 Negative  Negative mg/dL Final    POC Ketones 12/19/2024 Negative  Negative mg/dL Final    POC Specific Gravity 12/19/2024 1.020  1.005 - 1.030 Final    POC Blood 12/19/2024 Negative  Negative Final    POC Urine PH 12/19/2024 7.0  5.0 - 8.0 Final    POC Protein 12/19/2024 Negative  Negative mg/dL Final    Bilirubin 12/19/2024 Negative  Negative Final    Urobilinogen, Urine 12/19/2024 0.2  Negative Final    POC Nitrites 12/19/2024 Negative  Negative Final    POC Leukocyte Esterase 12/19/2024 Negative  Negative Final  "      Sonographic evaluation: See report    Impression:  1. 36w2d Intrauterine Pregnancy  Patient was made aware that the growth is consistent with dates and possibly a small VSD was noted on her last scanabnormalities were noted.    2. Preeclampsia with Severe Features  Patient was counseled. Preeclampsia is a multisystem progressive disorder characterized by the new onset of hypertension and proteinuria, or of hypertension and significant end-organ dysfunction with or without proteinuria, in the last half of pregnancy or postpartum. The disorder is caused by placental and maternal vascular dysfunction and always resolves after delivery..    A subset of women with preeclampsia are classified as manifesting the severe end of the preeclampsia spectrum (called \"preeclampsia with severe features,\" formerly \"severe preeclampsia\").  Systolic blood pressure >160 mmHg or diastolic blood pressure >110 mmHg and proteinuria (with or without signs and symptoms of significant end-organ dysfunction).  Systolic blood pressure >140 mmHg or diastolic blood pressure >90 mmHg (with or without proteinuria) and one or more of the following signs and symptoms of significant end-organ dysfunction:  New-onset cerebral or visual disturbance, such as:  -Photopsia (flashes of light) and/or scotomata (dark areas or gaps in the visual field).  -Severe headache (ie, incapacitating, \"the worst headache I've ever had\") or headache that persists and progresses despite analgesic therapy.  -Altered mental status.  Severe, persistent right upper quadrant or epigastric pain unresponsive to medication and not accounted for by an alternative diagnosis or serum transaminase concentration =2 times upper limit of normal for a specific laboratory, or both.  <100,000 platelets/microL.  Progressive renal insufficiency (serum creatinine >1.1 mg/dL [97.3 micromol/L]; some guidelines also include doubling of serum creatinine concentration in the absence of other " renal disease.  Pulmonary edema.    For women with preeclampsia and features of severe disease, we recommend intrapartum and postpartum seizure prophylaxis. The benefit of seizure prophylaxis is less clear in women without severe hypertension or preeclampsia symptoms; however, we also suggest intrapartum and postpartum prophylaxis for these women. We recommend the use of magnesium sulfate as a first-line agent for seizure prophylaxis in preeclampsia.      3. Pancreatitis  We discussed acute pancreatitis (AP). The presentation of AP includes epigastric or abdominal pain with back irradiation, nausea and vomiting , fever, abdominal distension, irritability, breathlessness, impaired consciousness, low oxygen saturation, tachypnoea, hypotension, ileus, and/or oliguria. In pregnancy, physical examination is difficult and should be performed with care; the most frequent findings are rebound pain, tenderness, and ileus with no peristalsis.  Abdominal ultrasound has a sensitivity of 73% and a specificity of 91% in diagnosing common bile duct stones.  When you suspect a common bile duct stone, MRI and endoscopic ultrasound may be necessary.  Appreciate surgery consult. Will await MRI results    Recommendations:  1. Suggest proceeding with delivery due to preeclampsia with severe features.  2. Will await MRI results  3. Will monitor for BP in the severe range.    The findings and recommendations were reviewed with the patient. Her questions were answered, and she expressed understanding of the information given. She agreed with the recommendations and plan of care as delineated above. Time spent face-to-face was 35 minutes. This included reviewing her prenatal records and hospital chart along with the time spent counseling the patient regarding the findings from today's assessment and plan of care.     Thank you for once again for your referral.    Donald Jansen M.D.

## 2024-12-24 NOTE — PROGRESS NOTES
0700 Report received from Chayo KONG RN  and [facility] care. POC discussed with pt and s/o and encouraged to state needs or questions at any time.    0389 Dr. Palacios at bedside, POC discussed with patient. D/C order received.

## 2024-12-24 NOTE — PROGRESS NOTES
0700 Report received from Chayo KONG RN and assumed care. POC discussed with pt and s/o and encouraged to state needs or questions at any time.    0725 Dr. Palacios at bedside, POC discussed with patient. D/C order received.    0750 Dr. Palacios to bedside after reviewing labs, POC discussed with patient. D/C cancelled, admission order received. Will consult with general surgery.    0925 Dr. Swanson updated, orders received.    1005 Report given to Suzanne KONG RN at bedside, care relinquished. Dr. Telles and Dr. Swanson at bedside, POC discussed with patient.

## 2024-12-25 LAB
ALBUMIN SERPL BCP-MCNC: 2.6 G/DL (ref 3.2–4.9)
ALBUMIN/GLOB SERPL: 0.8 G/DL
ALP SERPL-CCNC: 219 U/L (ref 30–99)
ALT SERPL-CCNC: 244 U/L (ref 2–50)
AMMONIA PLAS-SCNC: 24 UMOL/L (ref 11–45)
ANION GAP SERPL CALC-SCNC: 13 MMOL/L (ref 7–16)
APPEARANCE UR: CLEAR
APTT PPP: 28.4 SEC (ref 24.7–36)
AST SERPL-CCNC: 236 U/L (ref 12–45)
BACTERIA UR CULT: NORMAL
BASOPHILS # BLD AUTO: 0.2 % (ref 0–1.8)
BASOPHILS # BLD: 0.02 K/UL (ref 0–0.12)
BILIRUB SERPL-MCNC: 0.8 MG/DL (ref 0.1–1.5)
BILIRUB UR QL STRIP.AUTO: ABNORMAL
BUN SERPL-MCNC: 8 MG/DL (ref 8–22)
CALCIUM ALBUM COR SERPL-MCNC: 8.2 MG/DL (ref 8.5–10.5)
CALCIUM SERPL-MCNC: 7.1 MG/DL (ref 8.5–10.5)
CHLORIDE SERPL-SCNC: 102 MMOL/L (ref 96–112)
CO2 SERPL-SCNC: 20 MMOL/L (ref 20–33)
COLOR UR AUTO: YELLOW
CREAT SERPL-MCNC: 0.31 MG/DL (ref 0.5–1.4)
CREAT UR-MCNC: 141.42 MG/DL
EOSINOPHIL # BLD AUTO: 0.01 K/UL (ref 0–0.51)
EOSINOPHIL NFR BLD: 0.1 % (ref 0–6.9)
ERYTHROCYTE [DISTWIDTH] IN BLOOD BY AUTOMATED COUNT: 47.4 FL (ref 35.9–50)
GFR SERPLBLD CREATININE-BSD FMLA CKD-EPI: 143 ML/MIN/1.73 M 2
GLOBULIN SER CALC-MCNC: 3.1 G/DL (ref 1.9–3.5)
GLUCOSE SERPL-MCNC: 110 MG/DL (ref 65–99)
GLUCOSE UR QL STRIP.AUTO: NEGATIVE MG/DL
GP B STREP DNA SPEC QL NAA+PROBE: NEGATIVE
HCT VFR BLD AUTO: 32.5 % (ref 37–47)
HGB BLD-MCNC: 10.4 G/DL (ref 12–16)
IMM GRANULOCYTES # BLD AUTO: 0.06 K/UL (ref 0–0.11)
IMM GRANULOCYTES NFR BLD AUTO: 0.6 % (ref 0–0.9)
INR PPP: 1.11 (ref 0.87–1.13)
KETONES UR QL STRIP.AUTO: ABNORMAL MG/DL
LEUKOCYTE ESTERASE UR QL STRIP.AUTO: ABNORMAL
LIPASE SERPL-CCNC: 1071 U/L (ref 11–82)
LYMPHOCYTES # BLD AUTO: 1.29 K/UL (ref 1–4.8)
LYMPHOCYTES NFR BLD: 13.3 % (ref 22–41)
MAGNESIUM SERPL-MCNC: 4.1 MG/DL (ref 1.5–2.5)
MCH RBC QN AUTO: 25.7 PG (ref 27–33)
MCHC RBC AUTO-ENTMCNC: 32 G/DL (ref 32.2–35.5)
MCV RBC AUTO: 80.2 FL (ref 81.4–97.8)
MONOCYTES # BLD AUTO: 0.46 K/UL (ref 0–0.85)
MONOCYTES NFR BLD AUTO: 4.7 % (ref 0–13.4)
NEUTROPHILS # BLD AUTO: 7.88 K/UL (ref 1.82–7.42)
NEUTROPHILS NFR BLD: 81.1 % (ref 44–72)
NITRITE UR QL STRIP.AUTO: POSITIVE
NRBC # BLD AUTO: 0 K/UL
NRBC BLD-RTO: 0 /100 WBC (ref 0–0.2)
PH UR STRIP.AUTO: 5.5 [PH] (ref 5–8)
PLATELET # BLD AUTO: 304 K/UL (ref 164–446)
PMV BLD AUTO: 9.3 FL (ref 9–12.9)
POTASSIUM SERPL-SCNC: 3.5 MMOL/L (ref 3.6–5.5)
PROT SERPL-MCNC: 5.7 G/DL (ref 6–8.2)
PROT UR QL STRIP: 100 MG/DL
PROT UR-MCNC: 117 MG/DL (ref 0–15)
PROT/CREAT UR: 827 MG/G (ref 10–107)
PROTHROMBIN TIME: 14.4 SEC (ref 12–14.6)
RBC # BLD AUTO: 4.05 M/UL (ref 4.2–5.4)
RBC UR QL AUTO: ABNORMAL
SIGNIFICANT IND 70042: NORMAL
SITE SITE: NORMAL
SODIUM SERPL-SCNC: 135 MMOL/L (ref 135–145)
SOURCE SOURCE: NORMAL
SP GR UR STRIP.AUTO: 1.02 (ref 1–1.03)
UROBILINOGEN UR STRIP.AUTO-MCNC: 1 MG/DL
WBC # BLD AUTO: 9.7 K/UL (ref 4.8–10.8)

## 2024-12-25 PROCEDURE — 99231 SBSQ HOSP IP/OBS SF/LOW 25: CPT | Performed by: SURGERY

## 2024-12-25 PROCEDURE — 700111 HCHG RX REV CODE 636 W/ 250 OVERRIDE (IP): Mod: JZ | Performed by: OBSTETRICS & GYNECOLOGY

## 2024-12-25 PROCEDURE — 770002 HCHG ROOM/CARE - OB PRIVATE (112)

## 2024-12-25 PROCEDURE — 83690 ASSAY OF LIPASE: CPT

## 2024-12-25 PROCEDURE — 700105 HCHG RX REV CODE 258: Performed by: OBSTETRICS & GYNECOLOGY

## 2024-12-25 PROCEDURE — 82570 ASSAY OF URINE CREATININE: CPT

## 2024-12-25 PROCEDURE — A9270 NON-COVERED ITEM OR SERVICE: HCPCS | Performed by: OBSTETRICS & GYNECOLOGY

## 2024-12-25 PROCEDURE — 81002 URINALYSIS NONAUTO W/O SCOPE: CPT

## 2024-12-25 PROCEDURE — 36415 COLL VENOUS BLD VENIPUNCTURE: CPT

## 2024-12-25 PROCEDURE — 84156 ASSAY OF PROTEIN URINE: CPT

## 2024-12-25 PROCEDURE — A9270 NON-COVERED ITEM OR SERVICE: HCPCS | Performed by: STUDENT IN AN ORGANIZED HEALTH CARE EDUCATION/TRAINING PROGRAM

## 2024-12-25 PROCEDURE — 85610 PROTHROMBIN TIME: CPT

## 2024-12-25 PROCEDURE — 700101 HCHG RX REV CODE 250: Performed by: OBSTETRICS & GYNECOLOGY

## 2024-12-25 PROCEDURE — 83735 ASSAY OF MAGNESIUM: CPT

## 2024-12-25 PROCEDURE — 82140 ASSAY OF AMMONIA: CPT

## 2024-12-25 PROCEDURE — 85730 THROMBOPLASTIN TIME PARTIAL: CPT

## 2024-12-25 PROCEDURE — 700102 HCHG RX REV CODE 250 W/ 637 OVERRIDE(OP): Performed by: OBSTETRICS & GYNECOLOGY

## 2024-12-25 PROCEDURE — 85025 COMPLETE CBC W/AUTO DIFF WBC: CPT

## 2024-12-25 PROCEDURE — 80053 COMPREHEN METABOLIC PANEL: CPT

## 2024-12-25 PROCEDURE — 700111 HCHG RX REV CODE 636 W/ 250 OVERRIDE (IP): Mod: JZ | Performed by: STUDENT IN AN ORGANIZED HEALTH CARE EDUCATION/TRAINING PROGRAM

## 2024-12-25 PROCEDURE — 700102 HCHG RX REV CODE 250 W/ 637 OVERRIDE(OP): Performed by: STUDENT IN AN ORGANIZED HEALTH CARE EDUCATION/TRAINING PROGRAM

## 2024-12-25 RX ORDER — DEXTROSE MONOHYDRATE, SODIUM CHLORIDE, SODIUM LACTATE, POTASSIUM CHLORIDE, CALCIUM CHLORIDE 5; 600; 310; 179; 20 G/100ML; MG/100ML; MG/100ML; MG/100ML; MG/100ML
INJECTION, SOLUTION INTRAVENOUS CONTINUOUS
Status: DISCONTINUED | OUTPATIENT
Start: 2024-12-25 | End: 2024-12-26

## 2024-12-25 RX ORDER — SODIUM CHLORIDE, SODIUM LACTATE, POTASSIUM CHLORIDE, AND CALCIUM CHLORIDE .6; .31; .03; .02 G/100ML; G/100ML; G/100ML; G/100ML
500 INJECTION, SOLUTION INTRAVENOUS ONCE
Status: COMPLETED | OUTPATIENT
Start: 2024-12-25 | End: 2024-12-25

## 2024-12-25 RX ORDER — OSELTAMIVIR PHOSPHATE 75 MG/1
75 CAPSULE ORAL ONCE
Status: COMPLETED | OUTPATIENT
Start: 2024-12-25 | End: 2024-12-25

## 2024-12-25 RX ORDER — GUAIFENESIN 600 MG/1
600 TABLET, EXTENDED RELEASE ORAL EVERY 12 HOURS
Status: DISCONTINUED | OUTPATIENT
Start: 2024-12-25 | End: 2024-12-30 | Stop reason: HOSPADM

## 2024-12-25 RX ADMIN — GUAIFENESIN 600 MG: 600 TABLET, EXTENDED RELEASE ORAL at 20:21

## 2024-12-25 RX ADMIN — POTASSIUM CHLORIDE, SODIUM CHLORIDE, CALCIUM CHLORIDE, SODIUM LACTATE, AND DEXTROSE MONOHYDRATE: 1.79; 6; .2; 3.1; 5 INJECTION, SOLUTION INTRAVENOUS at 03:09

## 2024-12-25 RX ADMIN — ENOXAPARIN SODIUM 40 MG: 100 INJECTION SUBCUTANEOUS at 06:17

## 2024-12-25 RX ADMIN — HYDROMORPHONE HYDROCHLORIDE 0.4 MG: 1 INJECTION, SOLUTION INTRAMUSCULAR; INTRAVENOUS; SUBCUTANEOUS at 14:14

## 2024-12-25 RX ADMIN — KETOROLAC TROMETHAMINE 15 MG: 15 INJECTION, SOLUTION INTRAMUSCULAR; INTRAVENOUS at 22:10

## 2024-12-25 RX ADMIN — OSELTAMIVIR PHOSPHATE 75 MG: 75 CAPSULE ORAL at 09:56

## 2024-12-25 RX ADMIN — KETOROLAC TROMETHAMINE 15 MG: 15 INJECTION, SOLUTION INTRAMUSCULAR; INTRAVENOUS at 15:41

## 2024-12-25 RX ADMIN — POTASSIUM CHLORIDE, SODIUM CHLORIDE, CALCIUM CHLORIDE, SODIUM LACTATE, AND DEXTROSE MONOHYDRATE: 1.79; 6; .2; 3.1; 5 INJECTION, SOLUTION INTRAVENOUS at 13:38

## 2024-12-25 RX ADMIN — POTASSIUM CHLORIDE, SODIUM CHLORIDE, CALCIUM CHLORIDE, SODIUM LACTATE, AND DEXTROSE MONOHYDRATE: 1.79; 6; .2; 3.1; 5 INJECTION, SOLUTION INTRAVENOUS at 21:50

## 2024-12-25 RX ADMIN — KETOROLAC TROMETHAMINE 15 MG: 15 INJECTION, SOLUTION INTRAMUSCULAR; INTRAVENOUS at 09:55

## 2024-12-25 RX ADMIN — SODIUM CHLORIDE, POTASSIUM CHLORIDE, SODIUM LACTATE AND CALCIUM CHLORIDE 1000 ML: 600; 310; 30; 20 INJECTION, SOLUTION INTRAVENOUS at 02:28

## 2024-12-25 RX ADMIN — KETOROLAC TROMETHAMINE 15 MG: 15 INJECTION, SOLUTION INTRAMUSCULAR; INTRAVENOUS at 03:33

## 2024-12-25 RX ADMIN — SODIUM CHLORIDE, POTASSIUM CHLORIDE, SODIUM LACTATE AND CALCIUM CHLORIDE 500 ML: 600; 310; 30; 20 INJECTION, SOLUTION INTRAVENOUS at 14:57

## 2024-12-25 RX ADMIN — PRENATAL WITH FERROUS FUM AND FOLIC ACID 1 TABLET: 3080; 920; 120; 400; 22; 1.84; 3; 20; 10; 1; 12; 200; 27; 25; 2 TABLET ORAL at 08:42

## 2024-12-25 RX ADMIN — OXYCODONE HYDROCHLORIDE 10 MG: 10 TABLET ORAL at 01:04

## 2024-12-25 ASSESSMENT — PAIN DESCRIPTION - PAIN TYPE
TYPE: ACUTE PAIN

## 2024-12-25 NOTE — ANESTHESIA TIME REPORT
Anesthesia Start and Stop Event Times       Date Time Event    12/24/2024 1615 Ready for Procedure     1627 Anesthesia Start     1806 Anesthesia Stop          Responsible Staff  12/24/24      Name Role Begin End    Leoncio Andujar MD, PhD Anesth 1627 1806          Overtime Reason:  no overtime (within assigned shift)    Comments:

## 2024-12-25 NOTE — PROGRESS NOTES
1900-Bedside report received from Suzanne GARSIA. POC discussed, VS taken. All questions have been answered at this time, care assumed.     1909-This RN telephoned Dr. Andujar and updated on pt status/pain, POC discussed, orders received, see orders for details.     2000- This RN telephoned Dr. Swanson and updated on pt status/emesis, POC discussed.     2140-This RN telephoned Dr. Swanson and updated on pt status/UO, POC discussed, pt to stay on L&D for overnight obs, this RN to transfer pt to S232    2226-This RN and Tyra RN transferred pt to S232 with all personal belongings and side rails up, POC discussed with pt, instructed on use of IS, pt verbalizes understanding.    0235-This RN telephoned Dr. Swanson and updated on pt status/lab results/VS, POC discussed, orders received, see orders for details.     0522-This RN telephoned Dr. Swanson and updated on pt status/UO/VS, POC discussed, no orders received at this time.     0630-This RN and Dr. Swanson at bedside, UO seen by provider, POC discussed with pt, orders received, see orders for details.     0700-Report given to Suzanne GARSIA. POC discussed, all questions have been answered at this time, care relinquished.

## 2024-12-25 NOTE — OP REPORT
DATE OF SERVICE:  2024     OPERATION:  Repeat low transverse  section.     SURGEON:  Hoang Swanson MD     ASSISTANT:  Corinne Capurro, MD     ANESTHESIOLOGIST:  Leoncio Andujar MD     ANESTHESIA:  Spinal.     PREOPERATIVE DIAGNOSES:  1.  36 and 2/7th week gestation.  2.  Previous  section.  3.  Maternal obesity.  4.  Preeclampsia with severe features (severe hypertension, elevated liver   enzymes).  5.  Cholelithiasis.  6.  Acute pancreatitis.     POSTOPERATIVE DIAGNOSES:  1.  36 and 2/7th week gestation.  2.  Previous  section.  3.  Maternal obesity.  4.  Preeclampsia with severe features (severe hypertension, elevated liver   enzymes).  5.  Cholelithiasis.  6.  Acute pancreatitis.     COMPLICATIONS:  None.     ESTIMATED BLOOD LOSS:  500 mL.     FINDINGS:  Baby ---male, 1 minute Apgar 8, 5 minute Apgar 9, weight 2750 grams.     SPECIMENS SENT TO PATHOLOGY:  Placenta.     CORD GASES:  Umbilical artery pH 7.33, pCO2 50.5, base excess -1.  Umbilical   vein pH 7.38, pCO2 41.7, base excess -1.     INDICATIONS:  This 29-year-old lady is now  2, para 2.  She was   admitted after 2 days of steadily increasing upper abdominal pain and nausea.  She is   morbidly obese.  She was found to be hypertensive and proteinuric.  Ultrasound   revealed cholelithiasis with no bile duct obstruction, as well as   hepatomegaly and an echogenic liver compatible with fatty change.  There was   no ascites seen.  Magnetic resonance cholangiopancreatography revealed   gallstones, but no evidence of bile duct obstruction or stones in the bile   ducts.  Laboratory evaluation revealed markedly elevated liver enzymes,   bilirubin, lipase and amylase.  Fortunately, her triglycerides were within   normal limits at 144.  Her SGOT was 313, SGPT 237, alkaline phosphatase 297.    , lipase 2080, amylase 717.  Her renal function was normal with BUN 9   and creatinine 0.57.  Her platelet count was normal at  353.  Acute hepatitis   panel was negative.  Her coags were normal.     The presumptive etiology of her pancreatitis is gallstone pancreatitis   as her triglycerides were normal and she has had no alcohol intake.  We had   consultation from general surgeon Dr. Javier Telles and Perinatology consultation with Dr. Donald Jansen.  Recommendation was made for delivery by  section as   the definitive cure of her preeclampsia.  We discussed the possibility of a   combined  and cholecystectomy procedure and the consultants agreed   that cholecystectomy should be delayed.     Fetal monitoring was reassuring throughout this evaluation.     DESCRIPTION OF PROCEDURE:  The patient was taken to the OR.  Spinal anesthesia   was administered.  She received Ancef 3 grams prophylactically.  A Hyde   catheter was already in place. Betadine vaginal prep was accomplished.  She   was prepped and draped.  Timeout was done.  I made a Pfannenstiel skin   incision through a preexisting Pfannenstiel scar.  I dissected through a very   thick layer of subcutaneous fat with electrocautery.  The rectus fascia was   identified and incised transversely with electrocautery and scissors.  I    the fascia from the rectus muscles. I entered the peritoneum in the   midline with a hemostat well away from the bladder.  The peritoneal incision   was enlarged.  An Jeet O retractor was placed.  A scant amount of clear   straw colored intraperitoneal fluid was noted on entry.     The lower uterus was intact.  It was not unusually thin.  There were no   intraperitoneal adhesions noted.     I incised the vesicouterine peritoneum transversely.  I made sure the bladder   was clear of my proposed hysterotomy site.  I made a low transverse myometrial   incision with a scalpel.  I pierced the membranes with a hemostat revealing   clear fluid.  The baby's head was easily extracted from left occiput   transverse position.  I bulb  suctioned the baby's mouth.  There were no nuchal   cords.  The shoulders and body delivered easily.  Thirty seconds later, the   cord was triply clamped and cut.  The baby was handed off.  Cord gases were   sent.  The intact placenta and all trailing membranes delivered spontaneously.    I sponge curetted the endometrial cavity to make sure there were no retained   products of conception.  I closed the myometrium with running interlocking 0   Vicryl.  I placed a second imbricating layer of a running interlocking 0   Vicryl, obtaining good approximation and hemostasis.  Both fallopian tubes and   ovaries were inspected.  There were no adnexal masses evident.  I made no   attempt to palpate the upper abdominal organs.     The Jeet O was removed.  I closed the anterior parietal peritoneum and the   posterior layer of the rectus sheath with running 2-0 Vicryl.  I made sure   there was excellent subfascial hemostasis then closed both layers of the rectus   abdominis fascia with running 0 PDS.  I meticulously cauterized subcutaneous   bleeders until there was excellent hemostasis.  I closed Erin's fascia with   running 3-0 Vicryl.  I closed the skin with Insorb resorbable subcutaneous   staples, 1/2 inch wide Steri-Strips were placed.  I applied a Prevena wound   VAC dressing to the incision and connected it to the vacuum device.  Sponge and   needle counts were correct.  The patient will be observed closely on labor and   delivery overnight and all labs will be repeated in the a.m.  We will start   prophylactic Lovenox 12 hours postoperatively.        ______________________________  MD KIMBER Roque/NIECY    DD:  12/24/2024 18:35  DT:  12/24/2024 18:57    Job#:  [id number]    CC:MD Donald CRISOSTOMO MD Joseph R. Uccelli, MD

## 2024-12-25 NOTE — ANESTHESIA PROCEDURE NOTES
Spinal Block    Date/Time: 12/24/2024 4:36 PM    Performed by: Leoncio Andujar MD, PhD  Authorized by: Leoncio Andujar MD, PhD    Patient Location:  OR  Start Time:  12/24/2024 4:36 PM  End Time:  12/24/2024 4:42 PM  Reason for Block: primary anesthetic    patient identified, IV checked, site marked, risks and benefits discussed, surgical consent, monitors and equipment checked, pre-op evaluation and timeout performed    Patient Position:  Sitting  Prep: ChloraPrep, patient draped and sterile technique    Monitoring:  Blood pressure, continuous pulse oximetry and heart rate  Approach:  Midline  Location:  L3-4  Injection Technique:  Single-shot  Skin infiltration:  Lidocaine  Strength:  1%  Dose:  3ml  Needle Type:  Tarik  Needle Gauge:  25 G  CSF flowing pre/post injection:  Yes  Sensory Level:  T6   Using 5 inch pencil point

## 2024-12-25 NOTE — PROGRESS NOTES
"  DATE: 12/25/2024    HD 2 Gallstone Pancreatitis    INTERVAL EVENTS:  Pain improved a bit.  No nausea or emesis    PHYSICAL EXAMINATION:  Vital Signs: /53   Pulse (!) 103   Temp 36.2 °C (97.1 °F) (Temporal)   Resp 20   Ht 1.626 m (5' 4\")   Wt (!) 154 kg (340 lb)   SpO2 91%   GENERAL:  No acute distress  CARDIOVASCULAR:  Regular rate and rhythm  ABDOMEN: Soft, tender with palpation over the epigastria, non-distended  EXTREMITIES:  Good range of motion through out  NEUROLOGIC:  Alert and oriented.  Cranial Nerves II through XII are grossly intact, no focal deficits appreciated  PSYCHIATRIC:  Normal affect and mood appropriate for age and condition  SKIN:  Warm and well perfused, no rashes    LABORATORY VALUES:  Recent Labs     12/24/24  0450 12/25/24  0106   WBC 13.5* 9.7   RBC 4.76 4.05*   HEMOGLOBIN 11.9* 10.4*   HEMATOCRIT 37.9 32.5*   MCV 79.6* 80.2*   MCH 25.0* 25.7*   MCHC 31.4* 32.0*   RDW 47.2 47.4   PLATELETCT 353 304   MPV 9.3 9.3     Recent Labs     12/24/24  0450 12/25/24  0106   SODIUM 137 135   POTASSIUM 3.9 3.5*   CHLORIDE 104 102   CO2 19* 20   GLUCOSE 128* 110*   BUN 9 8   CREATININE 0.57 0.31*   CALCIUM 8.9 7.1*     Recent Labs     12/24/24  0450 12/24/24  1055 12/25/24  0106   ASTSGOT 313*  --  236*   ALTSGPT 237*  --  244*   TBILIRUBIN 2.4*  --  0.8   ALKPHOSPHAT 297*  --  219*   GLOBULIN 4.0*  --  3.1   INR  --  1.02 1.11   AMMONIA  --  19 24     Recent Labs     12/24/24  1055 12/25/24  0106   APTT 26.7 28.4   INR 1.02 1.11       IMAGING:  [id number]/O   Final Result      1.  Mildly distended gallbladder with multiple stones present.   2.  No biliary dilation or common bile duct stone.   3.  Mild RIGHT hydronephrosis, likely hydronephrosis of pregnancy.   4.  Small volume ascites, of uncertain etiology.      US-RUQ   Final Result         1.  Cholelithiasis without additional sonographic evidence of acute cholecystitis   2.  Hepatomegaly   3.  Echogenic liver, compatible with fatty " change versus fibrosis          ASSESSMENT AND PLAN:  Improving  Appears to be a mild case of acute pancreatitis  LFTs and lipase downward trending  Continue CLD  AM labs  Timing of cholecystectomy forthcoming       ____________________________________     Javier Telles M.D.

## 2024-12-25 NOTE — CARE PLAN
The patient is Watcher - Medium risk of patient condition declining or worsening    Shift Goals  Clinical Goals: Pain WNL  Patient Goals: Healthy mom healthy baby  Family Goals: Adequate support of pt and baby    Progress made toward(s) clinical / shift goals:    Problem: Knowledge Deficit - L&D  Goal: Patient and family/caregivers will demonstrate understanding of plan of care, disease process/condition, diagnostic tests and medications  Outcome: Progressing  Note: Patient encouraged to voice feelings and ask questions.      Problem: Pain  Goal: Patient's pain will be alleviated or reduced to the patient’s comfort goal  Outcome: Progressing  Note: Patient encouraged to ambulate to bathroom and voice changes in pain.

## 2024-12-25 NOTE — OR SURGEON
Immediate Post OP Note    PreOp Diagnosis:     36 2/7 wk gestation  Prior Csection  obesity  Preeclampsia with severe features (HTN,  elevated liver enzymes)  cholelithiasis  Acute pancreatitis (hypertriglyceridemia has been ruled out as a cause, suspect gallstone pancreatitis)      PostOp Diagnosis:     36 2/7 wk gestation  Prior Csection  obesity  Preeclampsia with severe features (HTN,  elevated liver enzymes)  cholelithiasis  Acute pancreatitis (hypertriglyceridemia has been ruled out as a cause, suspect gallstone pancreatitis)      Procedure(s):   SECTION, REPEAT    Surgeon(s):  Bruce E Farringer, M.D. Corinne E Capurro, M.D.    Anesthesiologist/Type of Anesthesia:  Anesthesiologist: Leoncio Andujar MD, PhD/Spinal    Surgical Staff:  Circulator: Suzanne Caicedo R.N.  Scrub Person: Stephani Campos  L&GO Baby  Nurse: Jeannine Livingston R.N.    Specimens removed if any:  Placenta to path    Estimated Blood Loss: 500 cc    Findings: scant clear straw-colored intraperitoneal fluid on entry; no adhesions;   Baby-male, APGARs  8-9 , wt 2750 grams;  20 cm Prevena wound-vac dressing placed    Cord gasses:     24 17:20   Cord Bg Ph 7.33   Cord Bg Pco2 50.5   Cord Bg Po2 21.2   Cord Bg Hco3 26   Cord Bg Base Excess -1   Cord Bg O2 Saturation 46.1   CV Ph 7.38   CV Pco2 41.7   CV Po2 35.8   CV Hco3 24   CV Base Excess -1   CV O2 Saturation 80.0       Complications: none    PLAN:    MgSO4 2g/hr.  Total  cc/hr.  Hourly UO.  O2 as needed.  Fat-free clear liquids only.  Labs at 03:00 (CBC, CMP, coags, amylase, lipase, ammonia).  Lovenox 40  mg/day starting 12 hrs postop.        2024 6:08 PM Hoang Swanson M.D.

## 2024-12-25 NOTE — ANESTHESIA POSTPROCEDURE EVALUATION
Patient: Shelby Calderon    Procedure Summary       Date: 24 Room / Location: [facility] OR 02 / SURGERY LABOR AND DELIVERY    Anesthesia Start:  Anesthesia Stop:     Procedure:  SECTION, REPEAT (Abdomen) Diagnosis: (PANCREATITIS, CHOLESTASIS, PRE-E)    Surgeons: Hoang Swanson M.D. Responsible Provider: Leoncio Andujar MD, PhD    Anesthesia Type: spinal ASA Status: 4            Final Anesthesia Type: spinal  Last vitals  BP   Blood Pressure: (!) 146/73    Temp   36.4 °C (97.6 °F)    Pulse   78   Resp   16    SpO2   94 %      Anesthesia Post Evaluation    Patient location during evaluation: PACU  Patient participation: complete - patient participated  Level of consciousness: awake  Pain score: 2    Airway patency: patent  Anesthetic complications: no  Cardiovascular status: hemodynamically stable  Respiratory status: acceptable  Hydration status: acceptable    PONV: none          Encounter Notable Events   Notable Event Outcome Phase Comment   Equipment missing / unavailable  Intraprocedure 5 min delay due to Ligasure machine replaced, resolved.        Nurse Pain Score: 5 (NPRS)

## 2024-12-25 NOTE — PROGRESS NOTES
OB 12 hrs postop    S:    Nausea and bilious vomiting last night, improved after ondansetron, minimal PO intake.  No flatus yet.  Pain controlled, avoiding tylenol because of hepatic disease.    PE:  Afebrile  Normotensive since delivery without any antihypertensives, 120s/50s-60s.  UO adequate, 567 cc/10 hrs (56 cc/hr), now bloody  Lungs CTA  Abd soft, flat, normal bopwel sounds, Prevena dressing intact and functioning.  Back: no CVAT  Ext: 2+ edema, DTR 1+    Labs improving in general.  HgbHct as expected, platelets and coags normal, mildly hypokalemic, renal function normal, AST lower, ALT stable, bilirubin normalized, lipase much lower, ammonia  still normal.   Mg++ non-toxic on 2 g/hr MgSO4.      12/24/24 04:50 12/25/24 01:06   WBC 13.5 (H) 9.7   Hemoglobin 11.9 (L) 10.4 (L)   Hematocrit 37.9 32.5 (L)   Platelet Count 353 304      12/24/24 10:55 12/25/24 01:06   PT 13.4 14.4   INR 1.02 1.11   APTT 26.7 28.4      12/24/24 04:50 12/24/24 08:05 12/24/24 10:55 12/25/24 01:06   Sodium 137   135   Potassium 3.9   3.5 (L)   Chloride 104   102   Co2 19 (L)   20   Anion Gap 14.0   13.0   Glucose 128 (H)   110 (H)   Bun 9   8   Creatinine 0.57   0.31 (L)   GFR (CKD-EPI) 124   143   Calcium 8.9   7.1 (L)   Correct Calcium 9.6   8.2 (L)   AST(SGOT) 313 (H)   236 (H)   ALT(SGPT) 237 (H)   244 (H)   Alkaline Phosphatase 297 (H)   219 (H)   Total Bilirubin 2.4 (H)   0.8   Albumin 3.1 (L)   2.6 (L)   Total Protein 7.1   5.7 (L)   Globulin 4.0 (H)   3.1   A-G Ratio 0.8   0.8   Magnesium    4.1 (H)   Uric Acid   5.2    Lipase  2080 (H)  1071 (H)   Amylase  717 (H)     LDH Total   346 (H)    Ammonia   19 24   Cholesterol,Tot 200 (H)      Triglycerides 144        IMP:    Preeclampsia resolving  Gallstone pancreatitis improving with bowel rest  Mild blood-loss anemia  Mild hypokalemia  Doubt Acute Fatty Liver of Pregnancy (AST improving, bilirubin normalized, normal ammonia and coags)  Gross hematuria likely secondary to  elias    PLAN:    Continue  cc/hr, add 20 meq KCl/liter.  MgSO4 DCd.  Remove elias, continue monitoring UO Q4H.  Stay on L and D until at least 24 hrs PP.  Fat-free clear -liquids only.  Labs tomorrow AM: CBC, CMP, LDH, amylase, lipase, triglycerides.  Lovenox 40 mg/day.

## 2024-12-25 NOTE — CARE PLAN
The patient is Stable - Low risk of patient condition declining or worsening    Shift Goals  Clinical Goals: pain WNL  Patient Goals: healthy mom healthy baby  Family Goals: adequate support of pt and baby    Progress made toward(s) clinical / shift goals:      Problem: Risk for Infection and Impaired Wound Healing  Goal: Patient will remain free from infection  Outcome: Progressing  Note: Pt will remain afebrile, and will show no s/s of infection.     Problem: Pain  Goal: Patient's pain will be alleviated or reduced to the patient’s comfort goal  Outcome: Progressing  Note: Pt continuously monitored for pain, educated on pain management options. Call light within reach, pt understands to call for RN regarding any needs or concerns.

## 2024-12-26 ENCOUNTER — APPOINTMENT (OUTPATIENT)
Dept: RADIOLOGY | Facility: MEDICAL CENTER | Age: 29
End: 2024-12-26
Attending: STUDENT IN AN ORGANIZED HEALTH CARE EDUCATION/TRAINING PROGRAM
Payer: COMMERCIAL

## 2024-12-26 ENCOUNTER — APPOINTMENT (OUTPATIENT)
Dept: RADIOLOGY | Facility: MEDICAL CENTER | Age: 29
End: 2024-12-26
Attending: OBSTETRICS & GYNECOLOGY
Payer: COMMERCIAL

## 2024-12-26 LAB
ALBUMIN SERPL BCP-MCNC: 2.7 G/DL (ref 3.2–4.9)
ALBUMIN/GLOB SERPL: 0.9 G/DL
ALP SERPL-CCNC: 164 U/L (ref 30–99)
ALT SERPL-CCNC: 183 U/L (ref 2–50)
AMYLASE SERPL-CCNC: 184 U/L (ref 20–103)
ANION GAP SERPL CALC-SCNC: 12 MMOL/L (ref 7–16)
ANISOCYTOSIS BLD QL SMEAR: NORMAL
APPEARANCE UR: CLEAR
AST SERPL-CCNC: 103 U/L (ref 12–45)
BACTERIA #/AREA URNS HPF: ABNORMAL /HPF
BASOPHILS # BLD AUTO: 0 % (ref 0–1.8)
BASOPHILS # BLD AUTO: 0.2 % (ref 0–1.8)
BASOPHILS # BLD: 0 K/UL (ref 0–0.12)
BASOPHILS # BLD: 0.02 K/UL (ref 0–0.12)
BILIRUB CONJ SERPL-MCNC: 0.4 MG/DL (ref 0.1–0.5)
BILIRUB INDIRECT SERPL-MCNC: 0.1 MG/DL (ref 0–1)
BILIRUB SERPL-MCNC: 0.4 MG/DL (ref 0.1–1.5)
BILIRUB SERPL-MCNC: 0.5 MG/DL (ref 0.1–1.5)
BILIRUB UR QL STRIP.AUTO: NEGATIVE
BUN SERPL-MCNC: 13 MG/DL (ref 8–22)
CALCIUM ALBUM COR SERPL-MCNC: 8.6 MG/DL (ref 8.5–10.5)
CALCIUM SERPL-MCNC: 7.6 MG/DL (ref 8.5–10.5)
CASTS URNS QL MICRO: ABNORMAL /LPF (ref 0–2)
CHLORIDE SERPL-SCNC: 103 MMOL/L (ref 96–112)
CO2 SERPL-SCNC: 20 MMOL/L (ref 20–33)
COLOR UR: YELLOW
CREAT SERPL-MCNC: 1.31 MG/DL (ref 0.5–1.4)
CREAT UR-MCNC: 153.19 MG/DL
EOSINOPHIL # BLD AUTO: 0 K/UL (ref 0–0.51)
EOSINOPHIL # BLD AUTO: 0.02 K/UL (ref 0–0.51)
EOSINOPHIL NFR BLD: 0 % (ref 0–6.9)
EOSINOPHIL NFR BLD: 0.2 % (ref 0–6.9)
EPITHELIAL CELLS 1715: ABNORMAL /HPF (ref 0–5)
ERYTHROCYTE [DISTWIDTH] IN BLOOD BY AUTOMATED COUNT: 51.9 FL (ref 35.9–50)
ERYTHROCYTE [DISTWIDTH] IN BLOOD BY AUTOMATED COUNT: 52.1 FL (ref 35.9–50)
GFR SERPLBLD CREATININE-BSD FMLA CKD-EPI: 56 ML/MIN/1.73 M 2
GLOBULIN SER CALC-MCNC: 3.1 G/DL (ref 1.9–3.5)
GLUCOSE SERPL-MCNC: 109 MG/DL (ref 65–99)
GLUCOSE UR STRIP.AUTO-MCNC: NEGATIVE MG/DL
HCT VFR BLD AUTO: 26.3 % (ref 37–47)
HCT VFR BLD AUTO: 27.7 % (ref 37–47)
HGB BLD-MCNC: 8.3 G/DL (ref 12–16)
HGB BLD-MCNC: 8.3 G/DL (ref 12–16)
HYALINE CAST   1831: PRESENT /LPF
IMM GRANULOCYTES # BLD AUTO: 0.08 K/UL (ref 0–0.11)
IMM GRANULOCYTES NFR BLD AUTO: 0.6 % (ref 0–0.9)
KETONES UR STRIP.AUTO-MCNC: 15 MG/DL
LDH SERPL L TO P-CCNC: 327 U/L (ref 107–266)
LDH SERPL L TO P-CCNC: 448 U/L (ref 107–266)
LEUKOCYTE ESTERASE UR QL STRIP.AUTO: ABNORMAL
LIPASE SERPL-CCNC: 297 U/L (ref 11–82)
LYMPHOCYTES # BLD AUTO: 0.47 K/UL (ref 1–4.8)
LYMPHOCYTES # BLD AUTO: 1.52 K/UL (ref 1–4.8)
LYMPHOCYTES NFR BLD: 12 % (ref 22–41)
LYMPHOCYTES NFR BLD: 3.5 % (ref 22–41)
MANUAL DIFF BLD: ABNORMAL
MCH RBC QN AUTO: 25.1 PG (ref 27–33)
MCH RBC QN AUTO: 26.2 PG (ref 27–33)
MCHC RBC AUTO-ENTMCNC: 30 G/DL (ref 32.2–35.5)
MCHC RBC AUTO-ENTMCNC: 31.6 G/DL (ref 32.2–35.5)
MCV RBC AUTO: 83 FL (ref 81.4–97.8)
MCV RBC AUTO: 83.7 FL (ref 81.4–97.8)
MICRO URNS: ABNORMAL
MONOCYTES # BLD AUTO: 0.35 K/UL (ref 0–0.85)
MONOCYTES # BLD AUTO: 0.77 K/UL (ref 0–0.85)
MONOCYTES NFR BLD AUTO: 2.6 % (ref 0–13.4)
MONOCYTES NFR BLD AUTO: 6.1 % (ref 0–13.4)
NEUTROPHILS # BLD AUTO: 10.21 K/UL (ref 1.82–7.42)
NEUTROPHILS # BLD AUTO: 12.56 K/UL (ref 1.82–7.42)
NEUTROPHILS NFR BLD: 80.9 % (ref 44–72)
NEUTROPHILS NFR BLD: 93 % (ref 44–72)
NEUTS BAND NFR BLD MANUAL: 0.9 % (ref 0–10)
NITRITE UR QL STRIP.AUTO: NEGATIVE
NRBC # BLD AUTO: 0 K/UL
NRBC BLD-RTO: 0 /100 WBC (ref 0–0.2)
PATH REV: NORMAL
PATH REV: NORMAL
PATHOLOGY CONSULT NOTE: NORMAL
PH UR STRIP.AUTO: 5.5 [PH] (ref 5–8)
PLATELET # BLD AUTO: 327 K/UL (ref 164–446)
PLATELET # BLD AUTO: 343 K/UL (ref 164–446)
PLATELET BLD QL SMEAR: NORMAL
PMV BLD AUTO: 9.4 FL (ref 9–12.9)
PMV BLD AUTO: 9.6 FL (ref 9–12.9)
POTASSIUM SERPL-SCNC: 4.4 MMOL/L (ref 3.6–5.5)
PROT SERPL-MCNC: 5.8 G/DL (ref 6–8.2)
PROT UR QL STRIP: 30 MG/DL
PROT UR-MCNC: 31 MG/DL (ref 0–15)
PROT/CREAT UR: 202 MG/G (ref 10–107)
RBC # BLD AUTO: 3.17 M/UL (ref 4.2–5.4)
RBC # BLD AUTO: 3.31 M/UL (ref 4.2–5.4)
RBC # URNS HPF: ABNORMAL /HPF (ref 0–2)
RBC BLD AUTO: PRESENT
RBC UR QL AUTO: ABNORMAL
SODIUM SERPL-SCNC: 135 MMOL/L (ref 135–145)
SP GR UR STRIP.AUTO: 1.02
TRIGL SERPL-MCNC: 160 MG/DL (ref 0–149)
UROBILINOGEN UR STRIP.AUTO-MCNC: 1 EU/DL
WBC # BLD AUTO: 12.6 K/UL (ref 4.8–10.8)
WBC # BLD AUTO: 13.5 K/UL (ref 4.8–10.8)
WBC #/AREA URNS HPF: ABNORMAL /HPF

## 2024-12-26 PROCEDURE — 85027 COMPLETE CBC AUTOMATED: CPT

## 2024-12-26 PROCEDURE — 82248 BILIRUBIN DIRECT: CPT

## 2024-12-26 PROCEDURE — 81001 URINALYSIS AUTO W/SCOPE: CPT

## 2024-12-26 PROCEDURE — 85007 BL SMEAR W/DIFF WBC COUNT: CPT

## 2024-12-26 PROCEDURE — 80053 COMPREHEN METABOLIC PANEL: CPT

## 2024-12-26 PROCEDURE — 82247 BILIRUBIN TOTAL: CPT

## 2024-12-26 PROCEDURE — 83690 ASSAY OF LIPASE: CPT

## 2024-12-26 PROCEDURE — 82150 ASSAY OF AMYLASE: CPT

## 2024-12-26 PROCEDURE — 36415 COLL VENOUS BLD VENIPUNCTURE: CPT

## 2024-12-26 PROCEDURE — 700111 HCHG RX REV CODE 636 W/ 250 OVERRIDE (IP): Mod: JZ | Performed by: OBSTETRICS & GYNECOLOGY

## 2024-12-26 PROCEDURE — 85025 COMPLETE CBC W/AUTO DIFF WBC: CPT

## 2024-12-26 PROCEDURE — 770002 HCHG ROOM/CARE - OB PRIVATE (112)

## 2024-12-26 PROCEDURE — 99231 SBSQ HOSP IP/OBS SF/LOW 25: CPT | Performed by: SURGERY

## 2024-12-26 PROCEDURE — A9270 NON-COVERED ITEM OR SERVICE: HCPCS | Performed by: OBSTETRICS & GYNECOLOGY

## 2024-12-26 PROCEDURE — 700102 HCHG RX REV CODE 250 W/ 637 OVERRIDE(OP): Performed by: OBSTETRICS & GYNECOLOGY

## 2024-12-26 PROCEDURE — 76775 US EXAM ABDO BACK WALL LIM: CPT

## 2024-12-26 PROCEDURE — 700101 HCHG RX REV CODE 250: Performed by: OBSTETRICS & GYNECOLOGY

## 2024-12-26 PROCEDURE — 700111 HCHG RX REV CODE 636 W/ 250 OVERRIDE (IP): Performed by: OBSTETRICS & GYNECOLOGY

## 2024-12-26 PROCEDURE — 84156 ASSAY OF PROTEIN URINE: CPT

## 2024-12-26 PROCEDURE — 83615 LACTATE (LD) (LDH) ENZYME: CPT

## 2024-12-26 PROCEDURE — 82570 ASSAY OF URINE CREATININE: CPT

## 2024-12-26 PROCEDURE — 84478 ASSAY OF TRIGLYCERIDES: CPT

## 2024-12-26 PROCEDURE — 700105 HCHG RX REV CODE 258: Performed by: OBSTETRICS & GYNECOLOGY

## 2024-12-26 PROCEDURE — 83010 ASSAY OF HAPTOGLOBIN QUANT: CPT

## 2024-12-26 PROCEDURE — 80503 PATH CLIN CONSLTJ SF 5-20: CPT

## 2024-12-26 RX ORDER — OXYCODONE HYDROCHLORIDE 5 MG/1
2.5-5 TABLET ORAL EVERY 6 HOURS PRN
Status: DISCONTINUED | OUTPATIENT
Start: 2024-12-26 | End: 2024-12-27 | Stop reason: HOSPADM

## 2024-12-26 RX ORDER — BISACODYL 10 MG
10 SUPPOSITORY, RECTAL RECTAL ONCE
Status: COMPLETED | OUTPATIENT
Start: 2024-12-26 | End: 2024-12-26

## 2024-12-26 RX ORDER — SODIUM CHLORIDE, SODIUM LACTATE, POTASSIUM CHLORIDE, CALCIUM CHLORIDE 600; 310; 30; 20 MG/100ML; MG/100ML; MG/100ML; MG/100ML
INJECTION, SOLUTION INTRAVENOUS CONTINUOUS
Status: DISCONTINUED | OUTPATIENT
Start: 2024-12-26 | End: 2024-12-30 | Stop reason: HOSPADM

## 2024-12-26 RX ADMIN — HYDROMORPHONE HYDROCHLORIDE 1 MG: 1 INJECTION, SOLUTION INTRAMUSCULAR; INTRAVENOUS; SUBCUTANEOUS at 04:57

## 2024-12-26 RX ADMIN — ENOXAPARIN SODIUM 40 MG: 100 INJECTION SUBCUTANEOUS at 18:11

## 2024-12-26 RX ADMIN — PRENATAL WITH FERROUS FUM AND FOLIC ACID 1 TABLET: 3080; 920; 120; 400; 22; 1.84; 3; 20; 10; 1; 12; 200; 27; 25; 2 TABLET ORAL at 08:36

## 2024-12-26 RX ADMIN — IBUPROFEN 800 MG: 800 TABLET, FILM COATED ORAL at 05:40

## 2024-12-26 RX ADMIN — HYDROMORPHONE HYDROCHLORIDE 1 MG: 1 INJECTION, SOLUTION INTRAMUSCULAR; INTRAVENOUS; SUBCUTANEOUS at 23:39

## 2024-12-26 RX ADMIN — OXYCODONE 5 MG: 5 TABLET ORAL at 13:51

## 2024-12-26 RX ADMIN — GUAIFENESIN 600 MG: 600 TABLET, EXTENDED RELEASE ORAL at 20:12

## 2024-12-26 RX ADMIN — GUAIFENESIN 600 MG: 600 TABLET, EXTENDED RELEASE ORAL at 08:36

## 2024-12-26 RX ADMIN — Medication 1 TABLET: at 18:12

## 2024-12-26 RX ADMIN — POTASSIUM CHLORIDE, SODIUM CHLORIDE, CALCIUM CHLORIDE, SODIUM LACTATE, AND DEXTROSE MONOHYDRATE: 1.79; 6; .2; 3.1; 5 INJECTION, SOLUTION INTRAVENOUS at 05:05

## 2024-12-26 RX ADMIN — NIFEDIPINE 10 MG: 10 CAPSULE ORAL at 20:15

## 2024-12-26 RX ADMIN — BISACODYL 10 MG: 10 SUPPOSITORY RECTAL at 08:36

## 2024-12-26 RX ADMIN — Medication 1 TABLET: at 08:37

## 2024-12-26 RX ADMIN — SODIUM CHLORIDE, POTASSIUM CHLORIDE, SODIUM LACTATE AND CALCIUM CHLORIDE: 600; 310; 30; 20 INJECTION, SOLUTION INTRAVENOUS at 06:42

## 2024-12-26 RX ADMIN — NIFEDIPINE 10 MG: 10 CAPSULE ORAL at 20:38

## 2024-12-26 RX ADMIN — SODIUM CHLORIDE, POTASSIUM CHLORIDE, SODIUM LACTATE AND CALCIUM CHLORIDE: 600; 310; 30; 20 INJECTION, SOLUTION INTRAVENOUS at 11:37

## 2024-12-26 RX ADMIN — HYDROMORPHONE HYDROCHLORIDE 1 MG: 1 INJECTION, SOLUTION INTRAMUSCULAR; INTRAVENOUS; SUBCUTANEOUS at 20:16

## 2024-12-26 RX ADMIN — HYDROMORPHONE HYDROCHLORIDE 1 MG: 1 INJECTION, SOLUTION INTRAMUSCULAR; INTRAVENOUS; SUBCUTANEOUS at 15:27

## 2024-12-26 ASSESSMENT — PAIN DESCRIPTION - PAIN TYPE
TYPE: ACUTE PAIN
TYPE: ACUTE PAIN;SURGICAL PAIN
TYPE: ACUTE PAIN
TYPE: ACUTE PAIN;SURGICAL PAIN
TYPE: ACUTE PAIN;SURGICAL PAIN
TYPE: ACUTE PAIN

## 2024-12-26 NOTE — CARE PLAN
The patient is Watcher - Medium risk of patient condition declining or worsening    Shift Goals  Clinical Goals: control pain  Patient Goals: healthy mom  Family Goals: support    Progress made toward(s) clinical / shift goals:    Problem: Knowledge Deficit - L&D  Goal: Patient and family/caregivers will demonstrate understanding of plan of care, disease process/condition, diagnostic tests and medications  Outcome: Progressing  POC discussed     Problem: Risk for Excess Fluid Volume  Goal: Patient will demonstrate pulse, blood pressure and neurologic signs within expected ranges and without any respiratory complications  Outcome: Progressing     Problem: Risk for Venous Thromboembolism (VTE)  Goal: VTE prevention measures will be implemented and patient will remain free from VTE  Outcome: Progressing  SCDs on     Problem: Pain  Goal: Patient's pain will be alleviated or reduced to the patient’s comfort goal  Outcome: Progressing  Pain scales/interventions per orders

## 2024-12-26 NOTE — PROGRESS NOTES
"  DATE: 12/26/2024    HD 3 Gallstone Pancreatitis    INTERVAL EVENTS:  Pain improved  No nausea or emesis    PHYSICAL EXAMINATION:  Vital Signs: BP (!) 140/65   Pulse 95   Temp 37.1 °C (98.8 °F) (Oral)   Resp 16   Ht 1.626 m (5' 4\")   Wt (!) 154 kg (340 lb)   SpO2 96%   GENERAL:  No acute distress  CARDIOVASCULAR:  Regular rate and rhythm  ABDOMEN: Soft, now only mildly tender with palpation over the epigastria, non-distended  EXTREMITIES:  Good range of motion through out  NEUROLOGIC:  Alert and oriented.  Cranial Nerves II through XII are grossly intact, no focal deficits appreciated  PSYCHIATRIC:  Normal affect and mood appropriate for age and condition  SKIN:  Warm and well perfused, no rashes    LABORATORY VALUES:  Recent Labs     12/24/24  0450 12/25/24  0106 12/26/24  0501   WBC 13.5* 9.7 12.6*   RBC 4.76 4.05* 3.17*   HEMOGLOBIN 11.9* 10.4* 8.3*   HEMATOCRIT 37.9 32.5* 26.3*   MCV 79.6* 80.2* 83.0   MCH 25.0* 25.7* 26.2*   MCHC 31.4* 32.0* 31.6*   RDW 47.2 47.4 51.9*   PLATELETCT 353 304 327   MPV 9.3 9.3 9.4     Recent Labs     12/24/24  0450 12/25/24  0106 12/26/24  0501   SODIUM 137 135 135   POTASSIUM 3.9 3.5* 4.4   CHLORIDE 104 102 103   CO2 19* 20 20   GLUCOSE 128* 110* 109*   BUN 9 8 13   CREATININE 0.57 0.31* 1.31   CALCIUM 8.9 7.1* 7.6*     Recent Labs     12/24/24  0450 12/24/24  1055 12/25/24  0106 12/26/24  0501   ASTSGOT 313*  --  236* 103*   ALTSGPT 237*  --  244* 183*   TBILIRUBIN 2.4*  --  0.8 0.4   ALKPHOSPHAT 297*  --  219* 164*   GLOBULIN 4.0*  --  3.1 3.1   INR  --  1.02 1.11  --    AMMONIA  --  19 24  --      Recent Labs     12/24/24  1055 12/25/24  0106   APTT 26.7 28.4   INR 1.02 1.11       IMAGING:  [id number]-O/O   Final Result      1.  Mildly distended gallbladder with multiple stones present.   2.  No biliary dilation or common bile duct stone.   3.  Mild RIGHT hydronephrosis, likely hydronephrosis of pregnancy.   4.  Small volume ascites, of uncertain etiology.      US-RUQ "   Final Result         1.  Cholelithiasis without additional sonographic evidence of acute cholecystitis   2.  Hepatomegaly   3.  Echogenic liver, compatible with fatty change versus fibrosis          ASSESSMENT AND PLAN:  Improving from a pancreatitis standpoint  Creatinine up, would not expect to see this as a sequelae of her pancreatitis as this appears to be a mild case of acute pancreatitis  LFTs and lipase downward trending  Continue CLD  AM labs  Timing of cholecystectomy forthcoming, will make NPO at midnight       ____________________________________     Javier Telles M.D.

## 2024-12-26 NOTE — LACTATION NOTE
"Initial Lactation Consultation:    Met with Shelby to provide pumping support. She reports that she has pumped a few times since delivery, but she has \"put a pause\" on pump sessions due to feeling unwell and overwhelmed. She reports that previous pump sessions have been comfortable, and denies a need for flange fitting at this time. We reviewed the supply and demand nature of the breastfeeding process. Shelby is aware that frequent, high-quality breast stimulation is required for the development of a mature breast milk supply, and that delaying pumping will reduce overall (long-and short- term) breast milk production. She verbalizes understanding of this.    Shelby is encouraged to request lactation re-consultation if/when she desires to resume breast pumping (she may request consultation through NICU, if she desires assistance following her own discharge from the hospital).     Handouts provided: How to Maximize Milk Production, Pump Parts Washing Guide, Hospital Grade Pump Rental Information, Hand Expression and Milk Storage Guidelines.   "

## 2024-12-26 NOTE — PROGRESS NOTES
1900: report received from Nevin GARSIA, care assumed.    1915: Pt reports a bad cough that is making her abdominal pain worse. Orders received for Mucinex from Dr Swanson. Pt heavily encouraged to orally hydrate.    2050: Dr Swanson aware of SG result. Orders to increase D5LR w/ K+ to 150mL/hr and keep encouraging oral hydration. Ok to continue administering toradol for pain per MD.    0400: Report given to Omayra SR RN, care relinquished.

## 2024-12-26 NOTE — PROGRESS NOTES
"OB 1.5 days postop    No nausea or vomiting, only \"burping.\"  Ambulating in room without orthostatic symptoms.     Hyde out    I/O: 2550 cc IVF/24 hrs, PO intake not charted but \"approx 2500 cc/24 hrs\" per RNs best recollection; UO only 470 cc/24 hrs    PE:    Afebrile  -141 /59-69 without intervention  Skin W/D  Lungs CTA  Abd soft, bowel sounds present but subdued, Prevena dressing intact  Back: no CVAT  Calves nontender, Negin neg, 2+ edema      LAB: Blood-loss anemia is evolving, K+ increased because of replacement and renal insufficiency, creat increased, SGOT/SGPT improving, bili still normal, amylase and lipase markedly improved.     12/24/24 04:50 12/25/24 01:06 12/26/24 05:01   WBC 13.5 (H) 9.7 12.6 (H)   Hemoglobin 11.9 (L) 10.4 (L) 8.3 (L)   Hematocrit 37.9 32.5 (L) 26.3 (L)   Platelet Count 353 304 327        12/24/24 04:50 12/24/24 08:05 12/24/24 10:55 12/25/24 01:06 12/26/24 05:01   Sodium 137   135 135   Potassium 3.9   3.5 (L) 4.4   Chloride 104   102 103   Co2 19 (L)   20 20   Anion Gap 14.0   13.0 12.0   Glucose 128 (H)   110 (H) 109 (H)   Bun 9   8 13   Creatinine 0.57   0.31 (L) 1.31   GFR (CKD-EPI) 124   143 56 !   Calcium 8.9   7.1 (L) 7.6 (L)   Correct Calcium 9.6   8.2 (L) 8.6   AST(SGOT) 313 (H)   236 (H) 103 (H)   ALT(SGPT) 237 (H)   244 (H) 183 (H)   Alkaline Phosphatase 297 (H)   219 (H) 164 (H)   Total Bilirubin 2.4 (H)   0.8 0.4   Albumin 3.1 (L)   2.6 (L) 2.7 (L)   Total Protein 7.1   5.7 (L) 5.8 (L)   Globulin 4.0 (H)   3.1 3.1   A-G Ratio 0.8   0.8 0.9   Magnesium    4.1 (H)    Lipase  2080 (H)  1071 (H) 297 (H)   Amylase  717 (H)   184 (H)   LDH Total   346 (H)  327 (H)   Ammonia   19 24    Cholesterol,Tot 200 (H)       Triglycerides 144    160 (H)        IMP:     1.5 days post repeat Csection  Preeclampsia resolving  Gallstone pancreatitis improving with bowel rest  Hyopkalemia resolved because of replacement and renal insufficiency  Evolving blood-loss anemia (expect HgbHct " to equilibrate downward for 48 hrs postop)  Doubt Acute Fatty Liver of Pregnancy (AST and ALT improving, bilirubin normalized, normal ammonia and coags)    PLAN:    Nephrology consult ASAP for oliguria and renal insufficiency-I spoke with Dr. Javier Zamora and he will see her this AM.   Start FoliTab 500 BID for blood-loss anemia, consider PRBC transfusion if Nephrology feels this would aid renal recovery.  Daily CBC, CMP, amylase, lipase.  Remove KCL from IVF.  DC NSAID order because of renal insufficiency.  Avoid tylenol because of hepatic disease.   Only opioid analgesia in the smallest dose possible because of renal impairment.   Prophylactic Lovenox.  Continue fat-free clear liquids to rest pancreas.   Gallbladder management per General Surgery (Dr. Telles).   Dulcolax RS now.

## 2024-12-26 NOTE — CONSULTS
"[facility] -  CONSULTATION NOTE               Author: Javier Zamora M.D. Date & Time: 2024  11:26 AM       REASON FOR CONSULTATION:   rojas    CHIEF COMPLAINT:   \"rojas\"    HISTORY OF PRESENT ILLNESS:    Patient is a 29 year old female with a a PMHx of 2 days post op from , uncomplicated surgery, history of morbid obesity BMI 58 presented for delivery of her baby on 2024.  On presentation she report nausea/vomiting, persistent and abdominal pain.  He she was found to have elevated LFTs and concern for pre-eclampsia with HTN.  Also pancreatitis with amylase 717 and lipase 2080.  Concern that her pancreatitis was caused by gallstone pancreatitis.  C section had 500cc of blood loss.  Planned for possibly cholecystectomy.  SBP did drop to the 90s on .  Creatinine was 0.3.  Creatinine today 1.3.  UPCR of 827 on .  UA prior to delivery with trace protein, negative blood, few bacteria.  MRI done  with mild right hydro, likely hydro from pregnancy.   Patient denies illicits.  Denies significant nsaid usage prior.  No known history of CKD prior.  Denies family history of ckd and denies history of renal stones or utis.     REVIEW OF SYSTEMS:    10 point ROS was performed and is as per HPI or otherwise negative    PAST MEDICAL HISTORY:   Past Medical History:   Diagnosis Date    BMI 50.0-59.9, adult (HCC)        PAST SURGICAL HISTORY:   Past Surgical History:   Procedure Laterality Date    REPEAT C SECTION Bilateral 2024    Procedure:  SECTION, REPEAT;  Surgeon: Hoang Swanson M.D.;  Location: SURGERY LABOR AND DELIVERY;  Service: Labor and Delivery    PRIMARY C SECTION Bilateral 2024    Procedure:  SECTION, PRIMARY;  Surgeon: Araseli Meehan M.D.;  Location: SURGERY LABOR AND DELIVERY;  Service: Labor and Delivery       FAMILY HISTORY:   Family History   Problem Relation Age of Onset    Hypertension Mother     Asthma Brother        SOCIAL " "HISTORY:   Social History     Tobacco Use   Smoking Status Never   Smokeless Tobacco Never     Social History     Substance and Sexual Activity   Alcohol Use Not Currently     Social History     Substance and Sexual Activity   Drug Use Never       HOME MEDICATIONS:   Reviewed and documented in chart    LABORATORY STUDIES:   Recent Labs     12/24/24  0450 12/25/24  0106 12/26/24  0501   SODIUM 137 135 135   POTASSIUM 3.9 3.5* 4.4   CHLORIDE 104 102 103   CO2 19* 20 20   GLUCOSE 128* 110* 109*   BUN 9 8 13   CREATININE 0.57 0.31* 1.31   CALCIUM 8.9 7.1* 7.6*       ALLERGIES:  Patient has no known allergies.    VS:  BP (!) 150/68 Comment: RN Notified  Pulse 97   Temp 36.6 °C (97.9 °F) (Oral)   Resp 18   Ht 1.626 m (5' 4\")   Wt (!) 154 kg (340 lb)   SpO2 95%   Breastfeeding Unknown   BMI 58.36 kg/m²     Physical Exam  Constitutional:       Appearance: She is obese.   HENT:      Head: Normocephalic.      Right Ear: External ear normal.      Left Ear: External ear normal.   Eyes:      General: No scleral icterus.  Cardiovascular:      Rate and Rhythm: Normal rate.      Pulses: Normal pulses.   Abdominal:      General: There is distension.      Tenderness: There is abdominal tenderness.   Musculoskeletal:         General: Swelling present.   Skin:     General: Skin is warm.   Neurological:      Mental Status: She is alert and oriented to person, place, and time.   Psychiatric:         Mood and Affect: Mood normal.         FLUID BALANCE:  In: 2547.7 [I.V.:2547.7]  Out: 470     IMAGING:  All imaging reviewed from admission to present day    IMPRESSION:  # JOSE CARLOS    - Cr trended up from 0.3 to 1.3    - Unclear etiology but risk factors include poor perfusion vs other etiologies continue work up    - Hgb dropped but PLTs appears WNL so less likely but recommend peripheral blood smear to evaluate for hemolysis, less likely ttp given platelets normal    - Repeat renal imaging with renal u/s    - Agree with IVF for now, " agree with stopping NSAIDs  # Pre-eclampsia  # Elevated LFTs  # Pancreatitis    - Being seen by surgery    - Management per primary team  # HTN    - If SBP remains elevated can consider nifedipine  # Anemia    - Management per primary team    - Suspect more from blood loss from procedure at this time than TMA/hemolysis    PLAN:  - Renal us, UA, repeat UPCR  - Send for peripheral blood smear, LDH, haptoglobin, direct and indirect bilirubin  - Agree with IVF and holding NSAIDs  - Defer blood transfusion parameters/requirements to primary team  - If SBP remains elevated can consider nifedipine  - Discussed with Dr Linares who will follow up these labs/imaging for results  - Recommend hematology consult to rule out evidence of hemolysis or TMA type process  - Dose all meds per eGFR < 15 in rojas    Thank you for the consultation!

## 2024-12-26 NOTE — PROGRESS NOTES
0400-Report received from Faye GARSIA. POC discussed, all questions have been answered at this time, care assumed.    0600-This RN telephoned Dr. Swanson and updated on pt status/PO intake/UO, POC discussed, orders received, see orders for details.

## 2024-12-27 ENCOUNTER — ANESTHESIA (OUTPATIENT)
Dept: SURGERY | Facility: MEDICAL CENTER | Age: 29
End: 2024-12-27
Payer: COMMERCIAL

## 2024-12-27 ENCOUNTER — ANESTHESIA EVENT (OUTPATIENT)
Dept: SURGERY | Facility: MEDICAL CENTER | Age: 29
End: 2024-12-27
Payer: COMMERCIAL

## 2024-12-27 PROBLEM — K85.10 GALLSTONE PANCREATITIS: Status: ACTIVE | Noted: 2024-12-27

## 2024-12-27 LAB
ALBUMIN SERPL BCP-MCNC: 2.6 G/DL (ref 3.2–4.9)
ALBUMIN/GLOB SERPL: 0.7 G/DL
ALP SERPL-CCNC: 152 U/L (ref 30–99)
ALT SERPL-CCNC: 121 U/L (ref 2–50)
AMYLASE SERPL-CCNC: 81 U/L (ref 20–103)
ANION GAP SERPL CALC-SCNC: 11 MMOL/L (ref 7–16)
AST SERPL-CCNC: 44 U/L (ref 12–45)
BASOPHILS # BLD AUTO: 0.1 % (ref 0–1.8)
BASOPHILS # BLD: 0.02 K/UL (ref 0–0.12)
BILIRUB SERPL-MCNC: 0.4 MG/DL (ref 0.1–1.5)
BUN SERPL-MCNC: 15 MG/DL (ref 8–22)
C3 SERPL-MCNC: 108.3 MG/DL (ref 87–200)
C4 SERPL-MCNC: 34.7 MG/DL (ref 19–52)
CALCIUM ALBUM COR SERPL-MCNC: 9.1 MG/DL (ref 8.5–10.5)
CALCIUM SERPL-MCNC: 8 MG/DL (ref 8.5–10.5)
CHLORIDE SERPL-SCNC: 103 MMOL/L (ref 96–112)
CO2 SERPL-SCNC: 20 MMOL/L (ref 20–33)
CREAT SERPL-MCNC: 1.12 MG/DL (ref 0.5–1.4)
EKG IMPRESSION: NORMAL
EOSINOPHIL # BLD AUTO: 0.03 K/UL (ref 0–0.51)
EOSINOPHIL NFR BLD: 0.2 % (ref 0–6.9)
ERYTHROCYTE [DISTWIDTH] IN BLOOD BY AUTOMATED COUNT: 53.2 FL (ref 35.9–50)
GFR SERPLBLD CREATININE-BSD FMLA CKD-EPI: 67 ML/MIN/1.73 M 2
GLOBULIN SER CALC-MCNC: 3.6 G/DL (ref 1.9–3.5)
GLUCOSE SERPL-MCNC: 101 MG/DL (ref 65–99)
HCT VFR BLD AUTO: 26.3 % (ref 37–47)
HGB BLD-MCNC: 8.1 G/DL (ref 12–16)
IMM GRANULOCYTES # BLD AUTO: 0.19 K/UL (ref 0–0.11)
IMM GRANULOCYTES NFR BLD AUTO: 1.3 % (ref 0–0.9)
LDH SERPL L TO P-CCNC: 253 U/L (ref 107–266)
LIPASE SERPL-CCNC: 146 U/L (ref 11–82)
LYMPHOCYTES # BLD AUTO: 1.66 K/UL (ref 1–4.8)
LYMPHOCYTES NFR BLD: 11.4 % (ref 22–41)
MCH RBC QN AUTO: 26.1 PG (ref 27–33)
MCHC RBC AUTO-ENTMCNC: 30.8 G/DL (ref 32.2–35.5)
MCV RBC AUTO: 84.8 FL (ref 81.4–97.8)
MONOCYTES # BLD AUTO: 0.98 K/UL (ref 0–0.85)
MONOCYTES NFR BLD AUTO: 6.7 % (ref 0–13.4)
NEUTROPHILS # BLD AUTO: 11.68 K/UL (ref 1.82–7.42)
NEUTROPHILS NFR BLD: 80.3 % (ref 44–72)
NRBC # BLD AUTO: 0.02 K/UL
NRBC BLD-RTO: 0.1 /100 WBC (ref 0–0.2)
PATHOLOGY CONSULT NOTE: NORMAL
PLATELET # BLD AUTO: 338 K/UL (ref 164–446)
PMV BLD AUTO: 9.3 FL (ref 9–12.9)
POTASSIUM SERPL-SCNC: 4 MMOL/L (ref 3.6–5.5)
PROT SERPL-MCNC: 6.2 G/DL (ref 6–8.2)
RBC # BLD AUTO: 3.1 M/UL (ref 4.2–5.4)
SODIUM SERPL-SCNC: 134 MMOL/L (ref 135–145)
TRIGL SERPL-MCNC: 125 MG/DL (ref 0–149)
WBC # BLD AUTO: 14.6 K/UL (ref 4.8–10.8)

## 2024-12-27 PROCEDURE — A9270 NON-COVERED ITEM OR SERVICE: HCPCS | Performed by: OBSTETRICS & GYNECOLOGY

## 2024-12-27 PROCEDURE — 700102 HCHG RX REV CODE 250 W/ 637 OVERRIDE(OP): Performed by: OBSTETRICS & GYNECOLOGY

## 2024-12-27 PROCEDURE — 83615 LACTATE (LD) (LDH) ENZYME: CPT

## 2024-12-27 PROCEDURE — 160002 HCHG RECOVERY MINUTES (STAT): Performed by: STUDENT IN AN ORGANIZED HEALTH CARE EDUCATION/TRAINING PROGRAM

## 2024-12-27 PROCEDURE — 93010 ELECTROCARDIOGRAM REPORT: CPT | Performed by: INTERNAL MEDICINE

## 2024-12-27 PROCEDURE — 86225 DNA ANTIBODY NATIVE: CPT

## 2024-12-27 PROCEDURE — A9270 NON-COVERED ITEM OR SERVICE: HCPCS | Performed by: PHYSICIAN ASSISTANT

## 2024-12-27 PROCEDURE — 700105 HCHG RX REV CODE 258: Performed by: OBSTETRICS & GYNECOLOGY

## 2024-12-27 PROCEDURE — 0FT44ZZ RESECTION OF GALLBLADDER, PERCUTANEOUS ENDOSCOPIC APPROACH: ICD-10-PCS | Performed by: STUDENT IN AN ORGANIZED HEALTH CARE EDUCATION/TRAINING PROGRAM

## 2024-12-27 PROCEDURE — 160036 HCHG PACU - EA ADDL 30 MINS PHASE I: Performed by: STUDENT IN AN ORGANIZED HEALTH CARE EDUCATION/TRAINING PROGRAM

## 2024-12-27 PROCEDURE — 700111 HCHG RX REV CODE 636 W/ 250 OVERRIDE (IP): Mod: JZ | Performed by: STUDENT IN AN ORGANIZED HEALTH CARE EDUCATION/TRAINING PROGRAM

## 2024-12-27 PROCEDURE — 47562 LAPAROSCOPIC CHOLECYSTECTOMY: CPT | Mod: AS | Performed by: PHYSICIAN ASSISTANT

## 2024-12-27 PROCEDURE — 700101 HCHG RX REV CODE 250: Performed by: STUDENT IN AN ORGANIZED HEALTH CARE EDUCATION/TRAINING PROGRAM

## 2024-12-27 PROCEDURE — 700111 HCHG RX REV CODE 636 W/ 250 OVERRIDE (IP): Performed by: OBSTETRICS & GYNECOLOGY

## 2024-12-27 PROCEDURE — 160009 HCHG ANES TIME/MIN: Performed by: STUDENT IN AN ORGANIZED HEALTH CARE EDUCATION/TRAINING PROGRAM

## 2024-12-27 PROCEDURE — 700102 HCHG RX REV CODE 250 W/ 637 OVERRIDE(OP): Performed by: STUDENT IN AN ORGANIZED HEALTH CARE EDUCATION/TRAINING PROGRAM

## 2024-12-27 PROCEDURE — 86160 COMPLEMENT ANTIGEN: CPT

## 2024-12-27 PROCEDURE — 700105 HCHG RX REV CODE 258: Performed by: STUDENT IN AN ORGANIZED HEALTH CARE EDUCATION/TRAINING PROGRAM

## 2024-12-27 PROCEDURE — A9270 NON-COVERED ITEM OR SERVICE: HCPCS | Performed by: STUDENT IN AN ORGANIZED HEALTH CARE EDUCATION/TRAINING PROGRAM

## 2024-12-27 PROCEDURE — 47562 LAPAROSCOPIC CHOLECYSTECTOMY: CPT | Performed by: STUDENT IN AN ORGANIZED HEALTH CARE EDUCATION/TRAINING PROGRAM

## 2024-12-27 PROCEDURE — 700111 HCHG RX REV CODE 636 W/ 250 OVERRIDE (IP): Performed by: STUDENT IN AN ORGANIZED HEALTH CARE EDUCATION/TRAINING PROGRAM

## 2024-12-27 PROCEDURE — 160048 HCHG OR STATISTICAL LEVEL 1-5: Performed by: STUDENT IN AN ORGANIZED HEALTH CARE EDUCATION/TRAINING PROGRAM

## 2024-12-27 PROCEDURE — 770001 HCHG ROOM/CARE - MED/SURG/GYN PRIV*

## 2024-12-27 PROCEDURE — 36415 COLL VENOUS BLD VENIPUNCTURE: CPT

## 2024-12-27 PROCEDURE — 83690 ASSAY OF LIPASE: CPT

## 2024-12-27 PROCEDURE — 85025 COMPLETE CBC W/AUTO DIFF WBC: CPT

## 2024-12-27 PROCEDURE — 700102 HCHG RX REV CODE 250 W/ 637 OVERRIDE(OP): Performed by: PHYSICIAN ASSISTANT

## 2024-12-27 PROCEDURE — 700104 HCHG RX REV CODE 254: Performed by: STUDENT IN AN ORGANIZED HEALTH CARE EDUCATION/TRAINING PROGRAM

## 2024-12-27 PROCEDURE — 86162 COMPLEMENT TOTAL (CH50): CPT

## 2024-12-27 PROCEDURE — 93005 ELECTROCARDIOGRAM TRACING: CPT | Mod: TC | Performed by: STUDENT IN AN ORGANIZED HEALTH CARE EDUCATION/TRAINING PROGRAM

## 2024-12-27 PROCEDURE — 160029 HCHG SURGERY MINUTES - 1ST 30 MINS LEVEL 4: Performed by: STUDENT IN AN ORGANIZED HEALTH CARE EDUCATION/TRAINING PROGRAM

## 2024-12-27 PROCEDURE — 160035 HCHG PACU - 1ST 60 MINS PHASE I: Performed by: STUDENT IN AN ORGANIZED HEALTH CARE EDUCATION/TRAINING PROGRAM

## 2024-12-27 PROCEDURE — 88304 TISSUE EXAM BY PATHOLOGIST: CPT

## 2024-12-27 PROCEDURE — 82150 ASSAY OF AMYLASE: CPT

## 2024-12-27 PROCEDURE — 160041 HCHG SURGERY MINUTES - EA ADDL 1 MIN LEVEL 4: Performed by: STUDENT IN AN ORGANIZED HEALTH CARE EDUCATION/TRAINING PROGRAM

## 2024-12-27 PROCEDURE — 80053 COMPREHEN METABOLIC PANEL: CPT

## 2024-12-27 PROCEDURE — 84478 ASSAY OF TRIGLYCERIDES: CPT

## 2024-12-27 RX ORDER — OXYCODONE HCL 5 MG/5 ML
10 SOLUTION, ORAL ORAL
Status: COMPLETED | OUTPATIENT
Start: 2024-12-27 | End: 2024-12-27

## 2024-12-27 RX ORDER — LIDOCAINE HYDROCHLORIDE 20 MG/ML
INJECTION, SOLUTION EPIDURAL; INFILTRATION; INTRACAUDAL; PERINEURAL PRN
Status: DISCONTINUED | OUTPATIENT
Start: 2024-12-27 | End: 2024-12-27 | Stop reason: SURG

## 2024-12-27 RX ORDER — ONDANSETRON 2 MG/ML
INJECTION INTRAMUSCULAR; INTRAVENOUS PRN
Status: DISCONTINUED | OUTPATIENT
Start: 2024-12-27 | End: 2024-12-27 | Stop reason: SURG

## 2024-12-27 RX ORDER — OXYCODONE HYDROCHLORIDE 10 MG/1
10 TABLET ORAL EVERY 4 HOURS PRN
Status: DISCONTINUED | OUTPATIENT
Start: 2024-12-27 | End: 2024-12-30 | Stop reason: HOSPADM

## 2024-12-27 RX ORDER — IPRATROPIUM BROMIDE AND ALBUTEROL SULFATE 2.5; .5 MG/3ML; MG/3ML
3 SOLUTION RESPIRATORY (INHALATION)
Status: DISCONTINUED | OUTPATIENT
Start: 2024-12-27 | End: 2024-12-27 | Stop reason: HOSPADM

## 2024-12-27 RX ORDER — DEXAMETHASONE SODIUM PHOSPHATE 4 MG/ML
INJECTION, SOLUTION INTRA-ARTICULAR; INTRALESIONAL; INTRAMUSCULAR; INTRAVENOUS; SOFT TISSUE PRN
Status: DISCONTINUED | OUTPATIENT
Start: 2024-12-27 | End: 2024-12-27 | Stop reason: SURG

## 2024-12-27 RX ORDER — CEFAZOLIN SODIUM 1 G/3ML
INJECTION, POWDER, FOR SOLUTION INTRAMUSCULAR; INTRAVENOUS PRN
Status: DISCONTINUED | OUTPATIENT
Start: 2024-12-27 | End: 2024-12-27 | Stop reason: SURG

## 2024-12-27 RX ORDER — OXYCODONE HYDROCHLORIDE 5 MG/1
5 TABLET ORAL EVERY 4 HOURS PRN
Status: DISCONTINUED | OUTPATIENT
Start: 2024-12-27 | End: 2024-12-30 | Stop reason: HOSPADM

## 2024-12-27 RX ORDER — ROCURONIUM BROMIDE 10 MG/ML
INJECTION, SOLUTION INTRAVENOUS PRN
Status: DISCONTINUED | OUTPATIENT
Start: 2024-12-27 | End: 2024-12-27 | Stop reason: SURG

## 2024-12-27 RX ORDER — SODIUM CHLORIDE, SODIUM LACTATE, POTASSIUM CHLORIDE, CALCIUM CHLORIDE 600; 310; 30; 20 MG/100ML; MG/100ML; MG/100ML; MG/100ML
INJECTION, SOLUTION INTRAVENOUS CONTINUOUS
Status: DISCONTINUED | OUTPATIENT
Start: 2024-12-27 | End: 2024-12-27 | Stop reason: HOSPADM

## 2024-12-27 RX ORDER — INDOCYANINE GREEN AND WATER 25 MG
2.5 KIT INJECTION ONCE
Status: COMPLETED | OUTPATIENT
Start: 2024-12-27 | End: 2024-12-27

## 2024-12-27 RX ORDER — LABETALOL HYDROCHLORIDE 5 MG/ML
5 INJECTION, SOLUTION INTRAVENOUS
Status: DISCONTINUED | OUTPATIENT
Start: 2024-12-27 | End: 2024-12-27 | Stop reason: HOSPADM

## 2024-12-27 RX ORDER — SODIUM CHLORIDE, SODIUM LACTATE, POTASSIUM CHLORIDE, CALCIUM CHLORIDE 600; 310; 30; 20 MG/100ML; MG/100ML; MG/100ML; MG/100ML
INJECTION, SOLUTION INTRAVENOUS
Status: DISCONTINUED | OUTPATIENT
Start: 2024-12-27 | End: 2024-12-27 | Stop reason: SURG

## 2024-12-27 RX ORDER — HYDRALAZINE HYDROCHLORIDE 20 MG/ML
5 INJECTION INTRAMUSCULAR; INTRAVENOUS
Status: DISCONTINUED | OUTPATIENT
Start: 2024-12-27 | End: 2024-12-27 | Stop reason: HOSPADM

## 2024-12-27 RX ORDER — OXYCODONE HCL 5 MG/5 ML
5 SOLUTION, ORAL ORAL
Status: COMPLETED | OUTPATIENT
Start: 2024-12-27 | End: 2024-12-27

## 2024-12-27 RX ORDER — MEPERIDINE HYDROCHLORIDE 25 MG/ML
12.5 INJECTION INTRAMUSCULAR; INTRAVENOUS; SUBCUTANEOUS
Status: DISCONTINUED | OUTPATIENT
Start: 2024-12-27 | End: 2024-12-27 | Stop reason: HOSPADM

## 2024-12-27 RX ORDER — ALBUTEROL SULFATE 90 UG/1
INHALANT RESPIRATORY (INHALATION) PRN
Status: DISCONTINUED | OUTPATIENT
Start: 2024-12-27 | End: 2024-12-27 | Stop reason: SURG

## 2024-12-27 RX ORDER — BENZONATATE 100 MG/1
100 CAPSULE ORAL 3 TIMES DAILY PRN
Status: DISCONTINUED | OUTPATIENT
Start: 2024-12-27 | End: 2024-12-27 | Stop reason: HOSPADM

## 2024-12-27 RX ORDER — BUPIVACAINE HYDROCHLORIDE AND EPINEPHRINE 5; 5 MG/ML; UG/ML
INJECTION, SOLUTION EPIDURAL; INTRACAUDAL; PERINEURAL
Status: DISCONTINUED | OUTPATIENT
Start: 2024-12-27 | End: 2024-12-27 | Stop reason: HOSPADM

## 2024-12-27 RX ORDER — HYDROMORPHONE HYDROCHLORIDE 1 MG/ML
0.2 INJECTION, SOLUTION INTRAMUSCULAR; INTRAVENOUS; SUBCUTANEOUS
Status: DISCONTINUED | OUTPATIENT
Start: 2024-12-27 | End: 2024-12-27 | Stop reason: HOSPADM

## 2024-12-27 RX ORDER — DEXMEDETOMIDINE HYDROCHLORIDE 100 UG/ML
INJECTION, SOLUTION INTRAVENOUS PRN
Status: DISCONTINUED | OUTPATIENT
Start: 2024-12-27 | End: 2024-12-27 | Stop reason: SURG

## 2024-12-27 RX ORDER — ONDANSETRON 2 MG/ML
4 INJECTION INTRAMUSCULAR; INTRAVENOUS
Status: COMPLETED | OUTPATIENT
Start: 2024-12-27 | End: 2024-12-27

## 2024-12-27 RX ORDER — EPINEPHRINE 1 MG/ML(1)
AMPUL (ML) INJECTION PRN
Status: DISCONTINUED | OUTPATIENT
Start: 2024-12-27 | End: 2024-12-27 | Stop reason: SURG

## 2024-12-27 RX ORDER — HYDROMORPHONE HYDROCHLORIDE 1 MG/ML
0.1 INJECTION, SOLUTION INTRAMUSCULAR; INTRAVENOUS; SUBCUTANEOUS
Status: DISCONTINUED | OUTPATIENT
Start: 2024-12-27 | End: 2024-12-27 | Stop reason: HOSPADM

## 2024-12-27 RX ORDER — HYDROMORPHONE HYDROCHLORIDE 1 MG/ML
0.4 INJECTION, SOLUTION INTRAMUSCULAR; INTRAVENOUS; SUBCUTANEOUS
Status: DISCONTINUED | OUTPATIENT
Start: 2024-12-27 | End: 2024-12-27 | Stop reason: HOSPADM

## 2024-12-27 RX ORDER — EPHEDRINE SULFATE 50 MG/ML
5 INJECTION, SOLUTION INTRAVENOUS
Status: DISCONTINUED | OUTPATIENT
Start: 2024-12-27 | End: 2024-12-27 | Stop reason: HOSPADM

## 2024-12-27 RX ORDER — DIPHENHYDRAMINE HYDROCHLORIDE 50 MG/ML
12.5 INJECTION INTRAMUSCULAR; INTRAVENOUS
Status: DISCONTINUED | OUTPATIENT
Start: 2024-12-27 | End: 2024-12-27 | Stop reason: HOSPADM

## 2024-12-27 RX ORDER — HALOPERIDOL 5 MG/ML
1 INJECTION INTRAMUSCULAR
Status: DISCONTINUED | OUTPATIENT
Start: 2024-12-27 | End: 2024-12-27 | Stop reason: HOSPADM

## 2024-12-27 RX ADMIN — SODIUM CHLORIDE, POTASSIUM CHLORIDE, SODIUM LACTATE AND CALCIUM CHLORIDE: 600; 310; 30; 20 INJECTION, SOLUTION INTRAVENOUS at 00:55

## 2024-12-27 RX ADMIN — DEXMEDETOMIDINE HYDROCHLORIDE 20 MCG: 100 INJECTION, SOLUTION INTRAVENOUS at 12:05

## 2024-12-27 RX ADMIN — HYDROMORPHONE HYDROCHLORIDE 0.4 MG: 1 INJECTION, SOLUTION INTRAMUSCULAR; INTRAVENOUS; SUBCUTANEOUS at 14:26

## 2024-12-27 RX ADMIN — FENTANYL CITRATE 50 MCG: 50 INJECTION, SOLUTION INTRAMUSCULAR; INTRAVENOUS at 12:46

## 2024-12-27 RX ADMIN — FENTANYL CITRATE 100 MCG: 50 INJECTION, SOLUTION INTRAMUSCULAR; INTRAVENOUS at 11:41

## 2024-12-27 RX ADMIN — PROPOFOL 150 MG: 10 INJECTION, EMULSION INTRAVENOUS at 11:43

## 2024-12-27 RX ADMIN — SUGAMMADEX 400 MG: 100 INJECTION, SOLUTION INTRAVENOUS at 12:44

## 2024-12-27 RX ADMIN — ROCURONIUM BROMIDE 20 MG: 50 INJECTION, SOLUTION INTRAVENOUS at 12:05

## 2024-12-27 RX ADMIN — SODIUM CHLORIDE, POTASSIUM CHLORIDE, SODIUM LACTATE AND CALCIUM CHLORIDE: 600; 310; 30; 20 INJECTION, SOLUTION INTRAVENOUS at 16:19

## 2024-12-27 RX ADMIN — BENZONATATE 100 MG: 100 CAPSULE ORAL at 04:16

## 2024-12-27 RX ADMIN — ALBUTEROL SULFATE 4 PUFF: 90 AEROSOL, METERED RESPIRATORY (INHALATION) at 11:46

## 2024-12-27 RX ADMIN — FENTANYL CITRATE 50 MCG: 50 INJECTION, SOLUTION INTRAMUSCULAR; INTRAVENOUS at 13:44

## 2024-12-27 RX ADMIN — GUAIFENESIN 600 MG: 600 TABLET, EXTENDED RELEASE ORAL at 20:27

## 2024-12-27 RX ADMIN — Medication 1 TABLET: at 06:10

## 2024-12-27 RX ADMIN — EPINEPHRINE 5 MCG: 1 INJECTION INTRAMUSCULAR; INTRAVENOUS; SUBCUTANEOUS at 11:56

## 2024-12-27 RX ADMIN — INDOCYANINE GREEN AND WATER 2.5 MG: KIT at 10:44

## 2024-12-27 RX ADMIN — FENTANYL CITRATE 50 MCG: 50 INJECTION, SOLUTION INTRAMUSCULAR; INTRAVENOUS at 13:31

## 2024-12-27 RX ADMIN — HYDROMORPHONE HYDROCHLORIDE 1 MG: 1 INJECTION, SOLUTION INTRAMUSCULAR; INTRAVENOUS; SUBCUTANEOUS at 03:15

## 2024-12-27 RX ADMIN — OXYCODONE HYDROCHLORIDE 10 MG: 10 TABLET ORAL at 16:53

## 2024-12-27 RX ADMIN — ROCURONIUM BROMIDE 70 MG: 50 INJECTION, SOLUTION INTRAVENOUS at 11:41

## 2024-12-27 RX ADMIN — CEFAZOLIN 3 G: 1 INJECTION, POWDER, FOR SOLUTION INTRAMUSCULAR; INTRAVENOUS at 11:56

## 2024-12-27 RX ADMIN — FENTANYL CITRATE 50 MCG: 50 INJECTION, SOLUTION INTRAMUSCULAR; INTRAVENOUS at 12:50

## 2024-12-27 RX ADMIN — FENTANYL CITRATE 25 MCG: 50 INJECTION, SOLUTION INTRAMUSCULAR; INTRAVENOUS at 12:51

## 2024-12-27 RX ADMIN — ONDANSETRON 4 MG: 2 INJECTION INTRAMUSCULAR; INTRAVENOUS at 13:31

## 2024-12-27 RX ADMIN — EPINEPHRINE 5 MCG: 1 INJECTION INTRAMUSCULAR; INTRAVENOUS; SUBCUTANEOUS at 11:50

## 2024-12-27 RX ADMIN — SODIUM CHLORIDE, POTASSIUM CHLORIDE, SODIUM LACTATE AND CALCIUM CHLORIDE: 600; 310; 30; 20 INJECTION, SOLUTION INTRAVENOUS at 11:35

## 2024-12-27 RX ADMIN — DEXAMETHASONE SODIUM PHOSPHATE 4 MG: 4 INJECTION INTRA-ARTICULAR; INTRALESIONAL; INTRAMUSCULAR; INTRAVENOUS; SOFT TISSUE at 11:56

## 2024-12-27 RX ADMIN — PROPOFOL 50 MG: 10 INJECTION, EMULSION INTRAVENOUS at 11:44

## 2024-12-27 RX ADMIN — LIDOCAINE HYDROCHLORIDE 100 MG: 20 INJECTION, SOLUTION EPIDURAL; INFILTRATION; INTRACAUDAL; PERINEURAL at 11:41

## 2024-12-27 RX ADMIN — PROPOFOL 200 MG: 10 INJECTION, EMULSION INTRAVENOUS at 11:41

## 2024-12-27 RX ADMIN — HYDROMORPHONE HYDROCHLORIDE 1 MG: 1 INJECTION, SOLUTION INTRAMUSCULAR; INTRAVENOUS; SUBCUTANEOUS at 06:09

## 2024-12-27 RX ADMIN — ONDANSETRON 4 MG: 2 INJECTION INTRAMUSCULAR; INTRAVENOUS at 12:44

## 2024-12-27 RX ADMIN — HYDROMORPHONE HYDROCHLORIDE 0.2 MG: 1 INJECTION, SOLUTION INTRAMUSCULAR; INTRAVENOUS; SUBCUTANEOUS at 14:45

## 2024-12-27 RX ADMIN — OXYCODONE HYDROCHLORIDE 10 MG: 5 SOLUTION ORAL at 13:45

## 2024-12-27 RX ADMIN — HYDROMORPHONE HYDROCHLORIDE 1 MG: 1 INJECTION, SOLUTION INTRAMUSCULAR; INTRAVENOUS; SUBCUTANEOUS at 09:06

## 2024-12-27 ASSESSMENT — LIFESTYLE VARIABLES
HOW MANY TIMES IN THE PAST YEAR HAVE YOU HAD 5 OR MORE DRINKS IN A DAY: 0
CONSUMPTION TOTAL: NEGATIVE
AVERAGE NUMBER OF DAYS PER WEEK YOU HAVE A DRINK CONTAINING ALCOHOL: 0
TOTAL SCORE: 0
HAVE YOU EVER FELT YOU SHOULD CUT DOWN ON YOUR DRINKING: NO
TOTAL SCORE: 0
HAVE PEOPLE ANNOYED YOU BY CRITICIZING YOUR DRINKING: NO
ALCOHOL_USE: NO
TOTAL SCORE: 0
DOES PATIENT WANT TO STOP DRINKING: NO
EVER FELT BAD OR GUILTY ABOUT YOUR DRINKING: NO
ON A TYPICAL DAY WHEN YOU DRINK ALCOHOL HOW MANY DRINKS DO YOU HAVE: 0
EVER HAD A DRINK FIRST THING IN THE MORNING TO STEADY YOUR NERVES TO GET RID OF A HANGOVER: NO

## 2024-12-27 ASSESSMENT — COGNITIVE AND FUNCTIONAL STATUS - GENERAL
SUGGESTED CMS G CODE MODIFIER DAILY ACTIVITY: CH
SUGGESTED CMS G CODE MODIFIER MOBILITY: CH
MOBILITY SCORE: 24
DAILY ACTIVITIY SCORE: 24

## 2024-12-27 ASSESSMENT — PAIN DESCRIPTION - PAIN TYPE
TYPE: SURGICAL PAIN
TYPE: SURGICAL PAIN
TYPE: ACUTE PAIN
TYPE: ACUTE PAIN;SURGICAL PAIN
TYPE: ACUTE PAIN;SURGICAL PAIN

## 2024-12-27 ASSESSMENT — PAIN SCALES - GENERAL: PAIN_LEVEL: 2

## 2024-12-27 NOTE — ANESTHESIA POSTPROCEDURE EVALUATION
Patient: Shelby Caldeorn    Procedure Summary       Date: 12/27/24 Room / Location: [facility] 09 / SURGERY [facility]    Anesthesia Start: 1135 Anesthesia Stop: 1305    Procedure: CHOLECYSTECTOMY, LAPAROSCOPIC (Abdomen) Diagnosis: (GALLSTONE PANCREATITIS)    Surgeons: Jose Deleon M.D. Responsible Provider: Leoncio Andujar MD, PhD    Anesthesia Type: general ASA Status: 3            Final Anesthesia Type: general  Last vitals  BP   Blood Pressure: 121/52    Temp   36.3 °C (97.3 °F)    Pulse   99   Resp   16    SpO2   93 %      Anesthesia Post Evaluation    Patient location during evaluation: PACU  Patient participation: complete - patient participated  Level of consciousness: awake  Pain score: 2    Airway patency: patent  Anesthetic complications: no  Cardiovascular status: hemodynamically stable  Respiratory status: acceptable  Hydration status: acceptable    PONV: none          No notable events documented.     Nurse Pain Score: 7 (NPRS)

## 2024-12-27 NOTE — CARE PLAN
The patient is Watcher - Medium risk of patient condition declining or worsening    Shift Goals  Clinical Goals: ambulate in hallways, pass flatus, increase urine output  Patient Goals: visit infant, rest  Family Goals: visit infant, rest    Progress made toward(s) clinical / shift goals:    Problem: Pain  Goal: Patient's pain will be alleviated or reduced to the patient’s comfort goal  Outcome: Progressing  Note: Pt medicated x 2 for incisional pain. Pt tolerated well.     Problem: Altered physiologic condition related to postoperative  delivery  Goal: Patient physiologically stable as evidenced by normal lochia, palpable uterine involution and vital signs within normal limits  Outcome: Progressing  Note: Fundus firm, lochia scant.       Patient is not progressing towards the following goals:

## 2024-12-27 NOTE — PROGRESS NOTES
POD #3 Repeat LTCS    S:  Pt had episode of coughing last night.  Was given Tessalon pearls with improvement.  Had 2 severe range BP treated with Nifedipine IR likely secondary to pain.  Was given Dilaudid with improvement. Minimal lochia. Now NPO for LSC [facility]. Voiding well. Passing flatus.  Not symptomatic from anemia. Is not attempting to pump for baby in NICU.     O: T 98  P 94   - 180  DBP 63 - 72  currently 147/67  ABD: Obese, ND, minimally tender, BS + hypoactive, Pervena dressing intact, FF below umb  EXT: +1 pedal edema, no cords or Homans  UO ave 50 mL /hour  Labs from 12/27/24  WBC 14  H/H 8/26  Plts 338K  Peripheral smear - no evidence of hemolysis  Bilirubin direct and indirect normal range  AST 44    Lipase 146  Amylase 81  BUN 15  Cr 1.12  Pro/Cr ratio from straight cath 202  Haptoglobin in process      Renal U/S normal    A/P:  POD #3 S/P repeat C/S at 36 2/7 weeks, pre-eclampsia with severe features ( BP, elevated LFTs), cholelithiasis, acute pancreatitis, JOSE CARLOS - improving   Pre-eclampsia:  LFTs normalizing.  Had 2 severe range BP but this improved with pain control and Nifedipine.  Currently not requiring antihypertensives  Cholelithiasis:  GS taking pt for LSC Sobia today.  Pt is NPO  Pancreatitis: Amylase and lipase normalizing  JOSE CARLOS:  Bun/Cr improving.  UO increased. As per request by Nephrology, Hematology has been consulted for possible hemolysis as etiology for JOSE CARLOS. Dr. Dawson  will review labs and see pt today.    Post op anemia:  Pt is asymptomatic and declines blood transfusion at this time.  UO has improved, so will hold off on transfusion.  Pervena dressing intact  F/U on Haptoglobin

## 2024-12-27 NOTE — PROGRESS NOTES
1900: Report from Jessy GARSIA. POC discussed, assumed care of pt.  2058: Dr. Linares notified of pt's severe range BP's and meds administered per order. Pt current's /63, no new orders at this time.  2247: Dr. Linares notified of PCR results and pt's current UO. Orders received to have LR infusion at 125 mL/hr once pt becomes NPO at midnight.  0345: Dr. Linares updated on pt's current status, see MAR for orders.  0529: Dr. Linares at bedside discussing POC with pt.  0700: Report to Dixie GARSIA, care relinquished.

## 2024-12-27 NOTE — OP REPORT
DATE OF OPERATION: 12/27/2024     PREOPERATIVE DIAGNOSIS: gallstone pancreatitis.    POSTOPERATIVE DIAGNOSIS: gallstone pancreatitis.    PROCEDURE PERFORMED: Laparoscopic cholecystectomy    SURGEON: Jose Deleon M.D.    ASSISTANT: Emily Mensah PA-C.     ANESTHESIOLOGIST: Leoncio Andujar M.D.    ANESTHESIA: General endotracheal anesthesia.    INDICATIONS: The patient is a 29 year-old woman with clinical and radiographic findings of gallstone pancreatitis. She is taken to the operating room for a laparoscopic cholecystectomy.     The nature of the surgical procedure warranted additional skilled operative assistance from a licensed Physician Assistant (SHAUNA). The assistant was present during the entire operation. The surgical assistant performed the following: provided assistance with optimal surgical exposure of the operative field and provided high complexity, subspecialty decision making input.     FINDINGS: Mild inflammation of the gallbladder. Multiple gallstones. Biliary/bloody ascites.    WOUND CLASSIFICATION: Class II, Clean Contaminated.    SPECIMEN: Gallbladder.    ESTIMATED BLOOD LOSS: 5 mL.    PROCEDURE: Following informed consent consent, the patient was properly identified, taken to the operating room and placed in supine position where general endotracheal anesthesia was administered. Intravenous antibiotics were administered by the anesthesiologist in correct time interval. The patient voided prior to surgery. A urinary catheter was not placed. Sequential compression devices were employed. The abdomen was prepped and draped into a sterile field. A timeout was conducted with the full attention and participation of all operating room personnel.    Marcaine 0.5% was used to infiltrate the port sites.  A small incision was made at Mak's point, and the Veress needle was introduced into the abdomen. Intra-abdominal position was confirmed with a saline drop test. Carbon dioxide  pneumoperitoneum was instilled. A 5 mm infraumbilical midline incision was made and a 5 mm optiview port was placed with a 5 mm 30 degree lens and camera. There was no observed injury from abdominal access. An 11 mm port was placed in the epigastric midline under direct vision. Two 5 mm right subcostal ports were placed under direct vision.     On inspection there was a small amount of bile tinged/bloody ascites likely secondary to her recent Csection and episode of pancreatitis. The gallbladder was identified and elevated. Dissection was carried out to completely expose and delineate the hepatocystic triangle. The critical view was achieved definitively identifying the single cystic duct and single cystic artery entering the gallbladder. These structures were multiply clipped proximally, once distally and divided. The gallbladder was dissected free from the undersurface of the liver using electrocautery and placed within a laparoscopic specimen retrieval bag. The gallbladder was delivered intact from the abdominal cavity and submitted for pathology.  The gallbladder fossa was inspected. Hemostasis was satisfactory.    The epigastric port site fascia was approximated using a trocar site closure device with a 0 VICRYL® Plus Antibacterial suture.    The patient tolerated the procedure well, and there were no apparent complications. All sponge, needle, and instrument counts were correct on 2 separate occasions. The patient was awakened, extubated, and transferred to  to the post anesthesia care unit (PACU) in satisfactory condition.     ____________________________________     Jose Deleon M.D.    DD: 12/27/2024  12:56 PM

## 2024-12-27 NOTE — PROGRESS NOTES
0700- Report received from ADY Griffin.     7866-  Dr. Palacios updated and order to transfer to a medical floor after surgery today received.     1000- Patient off the unit with transport to pre-op.

## 2024-12-27 NOTE — PROGRESS NOTES
"[facility] -  PROGRESS NOTE               Author: Javier Zamora M.D. Date & Time: 2024  9:16 AM     HPI:  Patient is a 29 year old female with a a PMHx of 2 days post op from , uncomplicated surgery, history of morbid obesity BMI 58 presented for delivery of her baby on 2024.  On presentation she report nausea/vomiting, persistent and abdominal pain.  He she was found to have elevated LFTs and concern for pre-eclampsia with HTN.  Also pancreatitis with amylase 717 and lipase 0.  Concern that her pancreatitis was caused by gallstone pancreatitis.  C section had 500cc of blood loss.  Planned for possibly cholecystectomy.  SBP did drop to the 90s on .  Creatinine was 0.3.  Creatinine today 1.3.  UPCR of 827 on .  UA prior to delivery with trace protein, negative blood, few bacteria.  MRI done  with mild right hydro, likely hydro from pregnancy.   Patient denies illicits.  Denies significant nsaid usage prior.  No known history of CKD prior.  Denies family history of ckd and denies history of renal stones or utis.        DAILY NEPHROLOGY SUMMARY:   - Cr 1.1 down from 1.3.  No new complaints today.  White count increased.  Hematology reviewed labs, doesn't think hemolysis. UPCR downtrending to 202    REVIEW OF SYSTEMS:    10 point ROS reviewed and is as per HPI/daily summary or otherwise negative    PMH/PSH/SH/FH:   Reviewed and unchanged since admission note    CURRENT MEDICATIONS:   Reviewed from admission to present day    VS:  BP (!) 141/69   Pulse 86   Temp 37.3 °C (99.1 °F) (Oral)   Resp 20   Ht 1.626 m (5' 4\")   Wt (!) 154 kg (340 lb)   SpO2 93%   Breastfeeding No   BMI 58.36 kg/m²     Physical Exam  Constitutional:       Appearance: She is obese.   HENT:      Head: Normocephalic.      Right Ear: External ear normal.      Left Ear: External ear normal.   Eyes:      General: No scleral icterus.  Cardiovascular:      Rate and Rhythm: Normal " rate.      Pulses: Normal pulses.   Abdominal:      General: There is distension.      Tenderness: There is abdominal tenderness.   Musculoskeletal:         General: Swelling present.   Skin:     General: Skin is warm.   Neurological:      Mental Status: She is alert and oriented to person, place, and time.   Psychiatric:         Mood and Affect: Mood normal.   Fluids:  In: 5027.6 [P.O.:1990; I.V.:3037.6]  Out: 995     LABS:  Recent Labs     12/25/24  0106 12/26/24  0501 12/27/24  0356   SODIUM 135 135 134*   POTASSIUM 3.5* 4.4 4.0   CHLORIDE 102 103 103   CO2 20 20 20   GLUCOSE 110* 109* 101*   BUN 8 13 15   CREATININE 0.31* 1.31 1.12   CALCIUM 7.1* 7.6* 8.0*       IMAGING:   All imaging reviewed from admission to present day    IMPRESSION:  # JOSE CARLOS    - Cr trended up from 0.3 to 1.3 now, slightly down trending    - Unclear etiology but risk factors include poor perfusion vs other etiologies continue work up    - hematology doesn't think hemolysis based on labs    - Renal u/s Slight enlargement of left kidney otherwise unremarkable bilateral renal ultrasound.     - UA with some trace blood but wasn't clean catch, some hyaline casts    - Agree with IVF for now, agree with stopping NSAIDs  # Pre-eclampsia    - UPCR downtrending  # Elevated LFTs  # Pancreatitis    - Being seen by surgery    - Management per primary team  # HTN    - If SBP remains elevated can consider nifedipine  # Anemia    - Management per primary team    - Smear no evidence of hemolysis     PLAN:  - Decrease IVF rate to 75cc/hr, stop if evidence of pulmonary edema  - Send for peripheral blood smear, LDH, haptoglobin, direct and indirect bilirubin  - Agree with holding NSAIDs  - Defer blood transfusion parameters/requirements to primary team  - If SBP remains elevated can consider nifedipine  - Will send for complements, ds dna, anca  - Haptoglobin pending  - Dose all meds per eGFR

## 2024-12-27 NOTE — ASSESSMENT & PLAN NOTE
RUQ/epigastric pain with lipase >1000  RUQ US and MRI with multiple gallstones  Plan for lap norma  NPO/mIVF  IV abx

## 2024-12-27 NOTE — PROGRESS NOTES
0700- Received report. Assumed care of pt.    0735- Dr. Swanson at BS, pt assessed. POC discussed- pt to ambulate in hallway, nephrology to consult, strict I&O's. Pt encouraged to increase oral fluid intake. Pt to visit infant in NICU if feeling well.    0745- Dr. Telles at BS- pt to possibly have surgery tomorrow- pt to be NPO at 2400. Pt verbalized an understanding.    1120- Dr. Zamora at BS, labs and U/S ordered. Pt updated on POC.    1130- Pt ambulating in hallway. Tolerated well.    1700- Dr. Linares at BS, labs nad U/S reviewed. Discussed with pt need for straight cath for UA and PCR. Pt verbalizes an understanding and agrees.    1900- Report to ADY Griffin

## 2024-12-27 NOTE — ANESTHESIA PREPROCEDURE EVALUATION
Case: [id number] Date/Time: 12/27/24 1125    Procedure: CHOLECYSTECTOMY, LAPAROSCOPIC    Location: [facility] OR 09 / SURGERY [facility]    Surgeons: Jose Deleon M.D.          30 yo F w/ BMI 58, influenza A, preeclampsia s/p planned CS (12/23/24), gallstone pancreatitis    Relevant Problems   No relevant active problems       Physical Exam    Airway   Mallampati: IV  TM distance: >3 FB  Neck ROM: full       Cardiovascular - normal exam  Rhythm: regular  Rate: normal  (-) murmur     Dental - normal exam           Pulmonary - normal exam  Breath sounds clear to auscultation     Abdominal    Neurological - normal exam                   Anesthesia Plan    ASA 3   ASA physical status 3 criteria: morbid obesity - BMI greater than or equal to 40    Plan - general       Airway plan will be ETT          Induction: intravenous    Postoperative Plan: Postoperative administration of opioids is intended.    Pertinent diagnostic labs and testing reviewed    Informed Consent:    Anesthetic plan and risks discussed with patient.    Use of blood products discussed with: patient whom consented to blood products.

## 2024-12-27 NOTE — OR NURSING
1303: Pt arrived from OR, handoff received from anesthesiologist and RN. Patient asleep, breathing even and unlabored.     1344: Patient awake, oriented x4, moving all extremities. Medicated for pain and nausea per MAR.      1428: Patient's  updated on status.     1404: Report give to T4 Zuri GASRIA.     1500: Patient transported to room on 2LO2.

## 2024-12-27 NOTE — PROGRESS NOTES
ADDENDUM:  The Hematologist, Dr Dawson, reviewed labs ordered by Nephrologist to evaluate for possible hemolysis as an etiology for pt's change in renal function.  His review of the labs did not indicate any hemolysis. He was not planning on seeing the patient unless needed.

## 2024-12-27 NOTE — ANESTHESIA PROCEDURE NOTES
Airway    Date/Time: 12/27/2024 11:43 AM    Performed by: Leoncio Andujar MD, PhD  Authorized by: Leoncio Andujar MD, PhD    Location:  OR  Urgency:  Elective  Indications for Airway Management:  Anesthesia      Spontaneous Ventilation: absent    Sedation Level:  Deep  Preoxygenated: Yes    Patient Position:  Sniffing  Mask Difficulty Assessment:  2 - vent by mask + OA or adjuvant +/- NMBA  Final Airway Type:  Endotracheal airway  Final Endotracheal Airway:  ETT  Cuffed: Yes    Technique Used for Successful ETT Placement:  Direct laryngoscopy    Insertion Site:  Oral  Blade Type:  Tiffanie  Laryngoscope Blade/Videolaryngoscope Blade Size:  3  ETT Size (mm):  7.0  Measured from:  Teeth  ETT to Teeth (cm):  22  Placement Verified by: auscultation and capnometry    Cormack-Lehane Classification:  Grade I - full view of glottis  Number of Attempts at Approach:  1   Bronchospasm immediately after intubation, treated w/ propofol, albuterol, and epinephrine

## 2024-12-27 NOTE — PROGRESS NOTES
Pt admitted to room T428 via transport in hospital bed from PACU at 1520. Report received from ADY Keller.      Patient reports pain at 6 on a scale of 0-10. Educated patient regarding pharmacologic and non pharmacologic modalities for pain management. Oriented to room call light and smoking policy.  Reviewed plan of care (equipment, incentive spirometer, sequential compression devices, medications, activity, diet, fall precautions, skin care, and pain) with patient and family. Welcome packet given and reviewed with patient, all questions answered. Education provided on oral hygiene program.     AA&Ox4. Denies CP/SOB.  See 2 RN skin note  Tolerating clear liquid diet. Denies N/V.  + void - BM. Last BM 12/24 PTA  Pt ambulates stand by    All needs met at this time. Call light within reach. Pt calls appropriately. Bed low and locked, non skid socks in place. Hourly rounding in place.

## 2024-12-27 NOTE — PROGRESS NOTES
I spoke with Nephrology who did not think a blood transfusion would improve her renal function and he stated that if the patient needed it from a clinical standpoint (orthostatic vitals, dizzy, etc), then it should be ordered. Pt currently denies any sx. Renal U/S was normal.  He also recommended a Hematology  consult as he was concerned that possible hemolysis was a potential  cause of her acute kidney issues. He ordered several tests,  including a straight cath Pro/Cr ratio, and wanted Hematology to review results.  Only LDH is back and was elevated to 448.  I spoke with Dr. Dawson from Hematology who will review the labs and consult in the morning.  I did tell Dr. Dawson that the patient was scheduled for [facility] tomorrow morning.  CBC, CMP, LDH, triglycerides , amylase and lipase ordered for tomorrow morning at 0300 hours.

## 2024-12-27 NOTE — CARE PLAN
The patient is Watcher - Medium risk of patient condition declining or worsening    Shift Goals  Clinical Goals: Pain control, control BP, increase urine output  Patient Goals: Feel better, pain control, rest  Family Goals: Support      Problem: Psychosocial - L&D  Goal: Spiritual and cultural needs incorporated into hospitalization  Outcome: Progressing  Flowsheets (Taken 12/27/2024 4034)  Incorporate Spiritual/Cultural Needs: Encouraged verbilization of feelings, concerns, expectations and needs     Problem: Risk for Fluid Imbalance  Goal: Patient's fluid volume balance will be maintained or improve  Outcome: Progressing  Note: Urine output improving this shift.     Problem: Pain  Goal: Patient's pain will be alleviated or reduced to the patient’s comfort goal  Outcome: Progressing  Note: Patient medicated for pain per orders. Pt educate to call RN whenever pain intervention is needed.

## 2024-12-27 NOTE — DISCHARGE PLANNING
Discharge Planning Assessment Post Partum     Reason for Referral: NICU Admission  Address: [address] [place] [address]   Type of Living Situation:Stable  Mom Diagnosis:  Delivery  Baby Diagnosis: Respiratory insufficiency syndrome of    Primary Language: English     Name of Baby: Federico   Father of the Baby: Zackary Calderon  Involved in baby’s care? yes  Contact Information: [phone]     Prenatal Care: yes  Mom's PCP: Pcp Pt States None  PCP for new baby:Dr. Hiren Jacobo     Support System: yes  Coping/Bonding between mother & baby: mother was in surgery. Father visiting in the NICU  Source of Feeding: breast milk  Supplies for Infant: yes     Mom's Insurance: [employer]  Baby Covered on Insurance:yes  Mother Employed/School: mother and father are employed at [employer]  Other children in the home/names & ages: yes, Daniel (11 months)      Financial Hardship/Income: denied   Mom's Mental status: unable to assess.   Services used prior to admit: none     CPS History: none  Psychiatric History: none  Domestic Violence History: none  Drug/ETOH History: none     Resources Provided: none needed at this time.   Referrals Made: none      Clearance for Discharge: baby to discharge to parents once medically appropriate.       Ongoing Plan:LMSW will continue to follow for resources and referrals as needed.

## 2024-12-27 NOTE — PROGRESS NOTES
"  DATE: 12/27/2024  Shelby Calderon is a 29 y.o. women s/p CS on 12/24, surgery is following for acute gallstone pancreatitis    INTERVAL EVENTS:  Cr stable, good UOP  Pain well controlled  Lipase normalized  Plan for OR today    REVIEW OF SYSTEMS:  Comprehensive review of systems is negative with the exception of the aforementioned HPI, PMH, and PSH bullets in accordance with CMS guidelines.    PHYSICAL EXAMINATION:  Vital Signs: BP (!) 141/69   Pulse 86   Temp 37.3 °C (99.1 °F) (Oral)   Resp 20   Ht 1.626 m (5' 4\")   Wt (!) 154 kg (340 lb)   SpO2 93%   Physical Exam  Vitals and nursing note reviewed.   HENT:      Head: Normocephalic.      Nose: Nose normal.      Mouth/Throat:      Mouth: Mucous membranes are moist.      Pharynx: Oropharynx is clear.   Eyes:      Pupils: Pupils are equal, round, and reactive to light.   Cardiovascular:      Rate and Rhythm: Normal rate and regular rhythm.   Pulmonary:      Effort: Pulmonary effort is normal. No respiratory distress.   Abdominal:      General: There is no distension.      Palpations: Abdomen is soft.      Comments: Obese abdomen  Mild TTP in RUQ/epigastric region   Skin:     General: Skin is warm and dry.   Neurological:      General: No focal deficit present.      Mental Status: She is alert and oriented to person, place, and time.   Psychiatric:         Mood and Affect: Mood normal.       LABORATORY VALUES:  Recent Labs     12/26/24  0501 12/26/24  1305 12/27/24  0356   WBC 12.6* 13.5* 14.6*   RBC 3.17* 3.31* 3.10*   HEMOGLOBIN 8.3* 8.3* 8.1*   HEMATOCRIT 26.3* 27.7* 26.3*   MCV 83.0 83.7 84.8   MCH 26.2* 25.1* 26.1*   MCHC 31.6* 30.0* 30.8*   RDW 51.9* 52.1* 53.2*   PLATELETCT 327 343 338   MPV 9.4 9.6 9.3     Recent Labs     12/25/24  0106 12/26/24  0501 12/27/24  0356   SODIUM 135 135 134*   POTASSIUM 3.5* 4.4 4.0   CHLORIDE 102 103 103   CO2 20 20 20   GLUCOSE 110* 109* 101*   BUN 8 13 15   CREATININE 0.31* 1.31 1.12   CALCIUM 7.1* 7.6* 8.0* "     Recent Labs     12/24/24  1055 12/25/24  0106 12/25/24  0106 12/26/24  0501 12/26/24  1305 12/27/24  0356   ASTSGOT  --  236*  --  103*  --  44   ALTSGPT  --  244*  --  183*  --  121*   TBILIRUBIN  --  0.8   < > 0.4 0.5 0.4   IBILIRUBIN  --   --   --   --  0.1  --    DBILIRUBIN  --   --   --   --  0.4  --    ALKPHOSPHAT  --  219*  --  164*  --  152*   GLOBULIN  --  3.1  --  3.1  --  3.6*   INR 1.02 1.11  --   --   --   --    AMMONIA 19 24  --   --   --   --     < > = values in this interval not displayed.     Recent Labs     12/24/24  1055 12/25/24  0106   APTT 26.7 28.4   INR 1.02 1.11       IMAGING:  US-RENAL   Final Result      1.  Slight enlargement of left kidney otherwise unremarkable bilateral renal ultrasound.      [id number]   Final Result      1.  Mildly distended gallbladder with multiple stones present.   2.  No biliary dilation or common bile duct stone.   3.  Mild RIGHT hydronephrosis, likely hydronephrosis of pregnancy.   4.  Small volume ascites, of uncertain etiology.      US-RUQ   Final Result         1.  Cholelithiasis without additional sonographic evidence of acute cholecystitis   2.  Hepatomegaly   3.  Echogenic liver, compatible with fatty change versus fibrosis      IR-US GUIDED PIV    (Results Pending)       ASSESSMENT AND PLAN:  Gallstone pancreatitis  Assessment & Plan  RUQ/epigastric pain with lipase >1000  RUQ US and MRI with multiple gallstones  Plan for lap norma  NPO/mIVF  IV abx      The risk, benefits, and alternatives to surgery were discussed in detail with the patient.  The risks include (but not limited to) bleeding, infection, injury to other structures, and prolonged hospitalization.  All questions were answered and the patient is amendable to surgical intervention.      ____________________________________     Jose Deleon M.D.    DD: 12/27/2024  9:08 AM

## 2024-12-27 NOTE — ANESTHESIA TIME REPORT
Anesthesia Start and Stop Event Times       Date Time Event    12/27/2024 1058 Ready for Procedure     1135 Anesthesia Start     1305 Anesthesia Stop          Responsible Staff  12/27/24      Name Role Begin End    Leoncio Andujar MD, PhD Anesth 1135 1305          Overtime Reason:  no overtime (within assigned shift)    Comments:

## 2024-12-28 ENCOUNTER — APPOINTMENT (OUTPATIENT)
Dept: RADIOLOGY | Facility: MEDICAL CENTER | Age: 29
End: 2024-12-28
Attending: OBSTETRICS & GYNECOLOGY
Payer: COMMERCIAL

## 2024-12-28 LAB
ALBUMIN SERPL BCP-MCNC: 2.5 G/DL (ref 3.2–4.9)
ALBUMIN/GLOB SERPL: 0.7 G/DL
ALP SERPL-CCNC: 663 U/L (ref 30–99)
ALT SERPL-CCNC: 83 U/L (ref 2–50)
AMYLASE SERPL-CCNC: 47 U/L (ref 20–103)
ANION GAP SERPL CALC-SCNC: 13 MMOL/L (ref 7–16)
AST SERPL-CCNC: 56 U/L (ref 12–45)
BASOPHILS # BLD AUTO: 0.1 % (ref 0–1.8)
BASOPHILS # BLD AUTO: 0.2 % (ref 0–1.8)
BASOPHILS # BLD: 0.02 K/UL (ref 0–0.12)
BASOPHILS # BLD: 0.02 K/UL (ref 0–0.12)
BILIRUB SERPL-MCNC: 0.3 MG/DL (ref 0.1–1.5)
BUN SERPL-MCNC: 14 MG/DL (ref 8–22)
CALCIUM ALBUM COR SERPL-MCNC: 9 MG/DL (ref 8.5–10.5)
CALCIUM SERPL-MCNC: 7.8 MG/DL (ref 8.5–10.5)
CHLORIDE SERPL-SCNC: 104 MMOL/L (ref 96–112)
CO2 SERPL-SCNC: 21 MMOL/L (ref 20–33)
CREAT SERPL-MCNC: 0.62 MG/DL (ref 0.5–1.4)
EOSINOPHIL # BLD AUTO: 0.01 K/UL (ref 0–0.51)
EOSINOPHIL # BLD AUTO: 0.01 K/UL (ref 0–0.51)
EOSINOPHIL NFR BLD: 0.1 % (ref 0–6.9)
EOSINOPHIL NFR BLD: 0.1 % (ref 0–6.9)
ERYTHROCYTE [DISTWIDTH] IN BLOOD BY AUTOMATED COUNT: 53 FL (ref 35.9–50)
ERYTHROCYTE [DISTWIDTH] IN BLOOD BY AUTOMATED COUNT: 53.1 FL (ref 35.9–50)
GFR SERPLBLD CREATININE-BSD FMLA CKD-EPI: 121 ML/MIN/1.73 M 2
GLOBULIN SER CALC-MCNC: 3.4 G/DL (ref 1.9–3.5)
GLUCOSE SERPL-MCNC: 119 MG/DL (ref 65–99)
HAPTOGLOB SERPL-MCNC: 261 MG/DL (ref 30–200)
HCT VFR BLD AUTO: 24.5 % (ref 37–47)
HCT VFR BLD AUTO: 25.7 % (ref 37–47)
HGB BLD-MCNC: 7.5 G/DL (ref 12–16)
HGB BLD-MCNC: 8.2 G/DL (ref 12–16)
IMM GRANULOCYTES # BLD AUTO: 0.13 K/UL (ref 0–0.11)
IMM GRANULOCYTES # BLD AUTO: 0.17 K/UL (ref 0–0.11)
IMM GRANULOCYTES NFR BLD AUTO: 1 % (ref 0–0.9)
IMM GRANULOCYTES NFR BLD AUTO: 1.1 % (ref 0–0.9)
LDH SERPL L TO P-CCNC: 223 U/L (ref 107–266)
LIPASE SERPL-CCNC: 70 U/L (ref 11–82)
LYMPHOCYTES # BLD AUTO: 1.25 K/UL (ref 1–4.8)
LYMPHOCYTES # BLD AUTO: 1.71 K/UL (ref 1–4.8)
LYMPHOCYTES NFR BLD: 11.2 % (ref 22–41)
LYMPHOCYTES NFR BLD: 9.4 % (ref 22–41)
MCH RBC QN AUTO: 25.6 PG (ref 27–33)
MCH RBC QN AUTO: 26.6 PG (ref 27–33)
MCHC RBC AUTO-ENTMCNC: 30.6 G/DL (ref 32.2–35.5)
MCHC RBC AUTO-ENTMCNC: 31.9 G/DL (ref 32.2–35.5)
MCV RBC AUTO: 83.4 FL (ref 81.4–97.8)
MCV RBC AUTO: 83.6 FL (ref 81.4–97.8)
MONOCYTES # BLD AUTO: 0.75 K/UL (ref 0–0.85)
MONOCYTES # BLD AUTO: 1.26 K/UL (ref 0–0.85)
MONOCYTES NFR BLD AUTO: 5.6 % (ref 0–13.4)
MONOCYTES NFR BLD AUTO: 8.2 % (ref 0–13.4)
NEUTROPHILS # BLD AUTO: 11.15 K/UL (ref 1.82–7.42)
NEUTROPHILS # BLD AUTO: 12.16 K/UL (ref 1.82–7.42)
NEUTROPHILS NFR BLD: 79.3 % (ref 44–72)
NEUTROPHILS NFR BLD: 83.7 % (ref 44–72)
NRBC # BLD AUTO: 0 K/UL
NRBC # BLD AUTO: 0 K/UL
NRBC BLD-RTO: 0 /100 WBC (ref 0–0.2)
NRBC BLD-RTO: 0 /100 WBC (ref 0–0.2)
PLATELET # BLD AUTO: 378 K/UL (ref 164–446)
PLATELET # BLD AUTO: 424 K/UL (ref 164–446)
PMV BLD AUTO: 9 FL (ref 9–12.9)
PMV BLD AUTO: 9.5 FL (ref 9–12.9)
POTASSIUM SERPL-SCNC: 4.4 MMOL/L (ref 3.6–5.5)
PROT SERPL-MCNC: 5.9 G/DL (ref 6–8.2)
RBC # BLD AUTO: 2.93 M/UL (ref 4.2–5.4)
RBC # BLD AUTO: 3.08 M/UL (ref 4.2–5.4)
SODIUM SERPL-SCNC: 138 MMOL/L (ref 135–145)
TRIGL SERPL-MCNC: 123 MG/DL (ref 0–149)
WBC # BLD AUTO: 13.3 K/UL (ref 4.8–10.8)
WBC # BLD AUTO: 15.3 K/UL (ref 4.8–10.8)

## 2024-12-28 PROCEDURE — 700102 HCHG RX REV CODE 250 W/ 637 OVERRIDE(OP): Performed by: PHYSICIAN ASSISTANT

## 2024-12-28 PROCEDURE — 700111 HCHG RX REV CODE 636 W/ 250 OVERRIDE (IP): Mod: JZ | Performed by: OBSTETRICS & GYNECOLOGY

## 2024-12-28 PROCEDURE — A9270 NON-COVERED ITEM OR SERVICE: HCPCS | Performed by: OBSTETRICS & GYNECOLOGY

## 2024-12-28 PROCEDURE — 302151 K-PAD 14X20: Performed by: OBSTETRICS & GYNECOLOGY

## 2024-12-28 PROCEDURE — 83615 LACTATE (LD) (LDH) ENZYME: CPT

## 2024-12-28 PROCEDURE — 85025 COMPLETE CBC W/AUTO DIFF WBC: CPT | Mod: 91

## 2024-12-28 PROCEDURE — 80053 COMPREHEN METABOLIC PANEL: CPT

## 2024-12-28 PROCEDURE — 83690 ASSAY OF LIPASE: CPT

## 2024-12-28 PROCEDURE — 71045 X-RAY EXAM CHEST 1 VIEW: CPT

## 2024-12-28 PROCEDURE — 700102 HCHG RX REV CODE 250 W/ 637 OVERRIDE(OP): Performed by: OBSTETRICS & GYNECOLOGY

## 2024-12-28 PROCEDURE — 82150 ASSAY OF AMYLASE: CPT

## 2024-12-28 PROCEDURE — 84478 ASSAY OF TRIGLYCERIDES: CPT

## 2024-12-28 PROCEDURE — 87040 BLOOD CULTURE FOR BACTERIA: CPT

## 2024-12-28 PROCEDURE — A9270 NON-COVERED ITEM OR SERVICE: HCPCS | Performed by: PHYSICIAN ASSISTANT

## 2024-12-28 PROCEDURE — 93005 ELECTROCARDIOGRAM TRACING: CPT | Mod: TC | Performed by: OBSTETRICS & GYNECOLOGY

## 2024-12-28 PROCEDURE — 770001 HCHG ROOM/CARE - MED/SURG/GYN PRIV*

## 2024-12-28 PROCEDURE — 36415 COLL VENOUS BLD VENIPUNCTURE: CPT

## 2024-12-28 PROCEDURE — 302131 K PAD MOTOR: Performed by: OBSTETRICS & GYNECOLOGY

## 2024-12-28 PROCEDURE — 80048 BASIC METABOLIC PNL TOTAL CA: CPT

## 2024-12-28 RX ORDER — BENZONATATE 100 MG/1
100 CAPSULE ORAL 3 TIMES DAILY PRN
Status: DISCONTINUED | OUTPATIENT
Start: 2024-12-28 | End: 2024-12-30 | Stop reason: HOSPADM

## 2024-12-28 RX ORDER — ACETAMINOPHEN 500 MG
1000 TABLET ORAL EVERY 6 HOURS PRN
Status: DISCONTINUED | OUTPATIENT
Start: 2024-12-28 | End: 2024-12-30 | Stop reason: HOSPADM

## 2024-12-28 RX ADMIN — OXYCODONE HYDROCHLORIDE 10 MG: 10 TABLET ORAL at 20:20

## 2024-12-28 RX ADMIN — BENZONATATE 100 MG: 100 CAPSULE ORAL at 17:54

## 2024-12-28 RX ADMIN — HYDROMORPHONE HYDROCHLORIDE 1 MG: 1 INJECTION, SOLUTION INTRAMUSCULAR; INTRAVENOUS; SUBCUTANEOUS at 11:32

## 2024-12-28 RX ADMIN — GUAIFENESIN 600 MG: 600 TABLET, EXTENDED RELEASE ORAL at 08:29

## 2024-12-28 RX ADMIN — OXYCODONE 5 MG: 5 TABLET ORAL at 04:35

## 2024-12-28 RX ADMIN — HYDROMORPHONE HYDROCHLORIDE 1 MG: 1 INJECTION, SOLUTION INTRAMUSCULAR; INTRAVENOUS; SUBCUTANEOUS at 05:35

## 2024-12-28 RX ADMIN — ACETAMINOPHEN 1000 MG: 500 TABLET ORAL at 23:53

## 2024-12-28 RX ADMIN — GUAIFENESIN 600 MG: 600 TABLET, EXTENDED RELEASE ORAL at 20:20

## 2024-12-28 RX ADMIN — OXYCODONE HYDROCHLORIDE 10 MG: 10 TABLET ORAL at 08:29

## 2024-12-28 ASSESSMENT — PAIN DESCRIPTION - PAIN TYPE
TYPE: ACUTE PAIN

## 2024-12-28 ASSESSMENT — COPD QUESTIONNAIRES
HAVE YOU SMOKED AT LEAST 100 CIGARETTES IN YOUR ENTIRE LIFE: NO/DON'T KNOW
DO YOU EVER COUGH UP ANY MUCUS OR PHLEGM?: NO/ONLY WITH OCCASIONAL COLDS OR INFECTIONS
COPD SCREENING SCORE: 0
DURING THE PAST 4 WEEKS HOW MUCH DID YOU FEEL SHORT OF BREATH: NONE/LITTLE OF THE TIME

## 2024-12-28 ASSESSMENT — EDINBURGH POSTNATAL DEPRESSION SCALE (EPDS)
I HAVE BEEN ANXIOUS OR WORRIED FOR NO GOOD REASON: NO, NOT AT ALL
I HAVE BEEN SO UNHAPPY THAT I HAVE HAD DIFFICULTY SLEEPING: NOT AT ALL
THE THOUGHT OF HARMING MYSELF HAS OCCURRED TO ME: NEVER
I HAVE BLAMED MYSELF UNNECESSARILY WHEN THINGS WENT WRONG: NO, NEVER
I HAVE FELT SCARED OR PANICKY FOR NO GOOD REASON: NO, NOT AT ALL
I HAVE BEEN SO UNHAPPY THAT I HAVE BEEN CRYING: NO, NEVER
THINGS HAVE BEEN GETTING ON TOP OF ME: NO, MOST OF THE TIME I HAVE COPED QUITE WELL
I HAVE LOOKED FORWARD WITH ENJOYMENT TO THINGS: AS MUCH AS I EVER DID
I HAVE BEEN ABLE TO LAUGH AND SEE THE FUNNY SIDE OF THINGS: AS MUCH AS I ALWAYS COULD
I HAVE FELT SAD OR MISERABLE: NOT VERY OFTEN

## 2024-12-28 NOTE — CARE PLAN
The patient is Stable - Low risk of patient condition declining or worsening    Shift Goals  Clinical Goals: pain management, prevena monitoring, fluid administration, site care  Patient Goals: rest, pain control  Family Goals: updates    Progress made toward(s) clinical / shift goals:      Fluids administered per MAR. Patient's pain managed with scheduled and PRN medications per MAR. Site care assessed. Prevena in place and monitored per policy. Patient tolerating diet and does not complain of any nausea.       Problem: Risk for Fluid Imbalance  Goal: Patient's fluid volume balance will be maintained or improve  Outcome: Progressing     Problem: Pain  Goal: Patient's pain will be alleviated or reduced to the patient’s comfort goal  Outcome: Progressing

## 2024-12-28 NOTE — PROGRESS NOTES
"[facility] -  PROGRESS NOTE               Author: Kirstie Dunn M.D. Date & Time: 2024  11:55 AM     HPI:  Patient is a 29 year old female with a a PMHx of 2 days post op from , uncomplicated surgery, history of morbid obesity BMI 58 presented for delivery of her baby on 2024.  On presentation she report nausea/vomiting, persistent and abdominal pain.  He she was found to have elevated LFTs and concern for pre-eclampsia with HTN.  Also pancreatitis with amylase 717 and lipase 0.  Concern that her pancreatitis was caused by gallstone pancreatitis.  C section had 500cc of blood loss.  Planned for possibly cholecystectomy.  SBP did drop to the 90s on .  Creatinine was 0.3.  Creatinine today 1.3.  UPCR of 827 on .  UA prior to delivery with trace protein, negative blood, few bacteria.  MRI done  with mild right hydro, likely hydro from pregnancy.   Patient denies illicits.  Denies significant nsaid usage prior.  No known history of CKD prior.  Denies family history of ckd and denies history of renal stones or utis.        DAILY NEPHROLOGY SUMMARY:   - Cr 1.1 down from 1.3.  No new complaints today.  White count increased.  Hematology reviewed labs, doesn't think hemolysis. UPCR downtrending to 202  : NAEON,Cr down 1.1->0.6.Hb low 7.5, plt stable, hapto 261    REVIEW OF SYSTEMS:    10 point ROS reviewed and is as per HPI/daily summary or otherwise negative    PMH/PSH/SH/FH:   Reviewed and unchanged since admission note    CURRENT MEDICATIONS:   Reviewed from admission to present day    VS:  /41   Pulse 79   Temp 36.6 °C (97.9 °F) (Temporal)   Resp 16   Ht 1.626 m (5' 4\")   Wt (!) 154 kg (340 lb)   SpO2 89%   Breastfeeding No   BMI 58.36 kg/m²     Physical Exam  Constitutional:       Appearance: She is obese.   HENT:      Head: Normocephalic.      Right Ear: External ear normal.      Left Ear: External ear normal.   Eyes:      " General: No scleral icterus.  Cardiovascular:      Rate and Rhythm: Normal rate.      Pulses: Normal pulses.   Abdominal:      General: There is distension.      Tenderness: There is abdominal tenderness.   Musculoskeletal:         General: Swelling present.   Skin:     General: Skin is warm.   Neurological:      Mental Status: She is alert and oriented to person, place, and time.   Psychiatric:         Mood and Affect: Mood normal.   Fluids:  In: 1030 [P.O.:30; I.V.:1000]  Out: -     LABS:  Recent Labs     12/26/24  0501 12/27/24  0356 12/28/24  0236   SODIUM 135 134* 138   POTASSIUM 4.4 4.0 4.4   CHLORIDE 103 103 104   CO2 20 20 21   GLUCOSE 109* 101* 119*   BUN 13 15 14   CREATININE 1.31 1.12 0.62   CALCIUM 7.6* 8.0* 7.8*       IMAGING:   All imaging reviewed from admission to present day    IMPRESSION:  # JOSE CARLOS    - Cr trended up from 0.3 to 1.3 now, slightly down trending    - Unclear etiology but risk factors include poor perfusion vs other etiologies continue work up    - hematology doesn't think hemolysis based on labs    - Renal u/s Slight enlargement of left kidney otherwise unremarkable bilateral renal ultrasound.     - UA with some trace blood but wasn't clean catch, some hyaline casts    - Agree with IVF for now, agree with stopping NSAIDs  # Pre-eclampsia    - UPCR downtrending  # Elevated LFTs  # Pancreatitis    - Being seen by surgery    - Management per primary team  # HTN    - If SBP remains elevated can consider nifedipine  # Anemia    - Management per primary team    - Smear no evidence of hemolysis, LDH,hapto, bili, C3/C4 WNL     PLAN:  - Hold off further IVF, encourage PO- JOSE CARLOS resolved  - Agree with holding NSAIDs  - Defer blood transfusion parameters/requirements to primary team  - If SBP remains elevated can consider nifedipine  - pending ds dna, anca  - Haptoglobin pending  - Dose all meds per eGFR

## 2024-12-28 NOTE — PROGRESS NOTES
Pt A&O x4. Plan of care discussed, all concerns addressed. No complaints of pain, chest pain, SOB, or N/V. Call light and belongings within reach. Pt educated on fall precautions with verbalized understanding. Bed locked and in lowest position.  VSS. All needs met at this time. Family at bedside.

## 2024-12-28 NOTE — PROGRESS NOTES
Report received from RN, assumed care at 0715  Pt is A0X4, and responds appropriately   Pt declines any SOB, chest pain, new onset of numbness/ tingling  Pt rates pain at 7/10, on a scale of 1-10, pt medicated per MAR  Pt is voiding adequately and without hesitancy  Pt has + flatus, last BM 12/24  X4 lap sites, transverse incision with prevena  Pt ambulates with a x 1 assist   Pt is tolerating a diet, pt denies any nausea/vomiting  Plan of care discussed, all questions answered. Explained importance of calling before getting OOB and pt verbalizes understanding. Explained importance of oral care. Call light is within reach, treaded slipper socks on, bed in lowest/ locked position, hourly rounding in place, all needs met at this time

## 2024-12-28 NOTE — PROGRESS NOTES
"  DATE: 12/28/2024    Post Operative Day  1 laparoscopic cholecystectomy.    INTERVAL EVENTS:  Post op pain controlled.  Complaining of cough.   Episode of bronchospasm yesterday.     PHYSICAL EXAMINATION:  Vital Signs: /41   Pulse 79   Temp 36.6 °C (97.9 °F) (Temporal)   Resp 16   Ht 1.626 m (5' 4\")   Wt (!) 154 kg (340 lb)   SpO2 89%     Abdomen: Appropriate post-op tenderness. Port sites well approximated, no overt signs of infection.     LABORATORY VALUES:  Recent Labs     12/26/24  1305 12/27/24  0356 12/28/24  0236   WBC 13.5* 14.6* 13.3*   RBC 3.31* 3.10* 2.93*   HEMOGLOBIN 8.3* 8.1* 7.5*   HEMATOCRIT 27.7* 26.3* 24.5*   MCV 83.7 84.8 83.6   MCH 25.1* 26.1* 25.6*   MCHC 30.0* 30.8* 30.6*   RDW 52.1* 53.2* 53.0*   PLATELETCT 343 338 378   MPV 9.6 9.3 9.5     Recent Labs     12/26/24  0501 12/27/24  0356 12/28/24  0236   SODIUM 135 134* 138   POTASSIUM 4.4 4.0 4.4   CHLORIDE 103 103 104   CO2 20 20 21   GLUCOSE 109* 101* 119*   BUN 13 15 14   CREATININE 1.31 1.12 0.62   CALCIUM 7.6* 8.0* 7.8*     Recent Labs     12/26/24  0501 12/26/24  1305 12/27/24  0356 12/28/24  0236   ASTSGOT 103*  --  44 56*   ALTSGPT 183*  --  121* 83*   TBILIRUBIN 0.4 0.5 0.4 0.3   IBILIRUBIN  --  0.1  --   --    DBILIRUBIN  --  0.4  --   --    ALKPHOSPHAT 164*  --  152* 663*   GLOBULIN 3.1  --  3.6* 3.4            IMAGING:  US-RENAL   Final Result      1.  Slight enlargement of left kidney otherwise unremarkable bilateral renal ultrasound.      [id number]   Final Result      1.  Mildly distended gallbladder with multiple stones present.   2.  No biliary dilation or common bile duct stone.   3.  Mild RIGHT hydronephrosis, likely hydronephrosis of pregnancy.   4.  Small volume ascites, of uncertain etiology.      US-RUQ   Final Result         1.  Cholelithiasis without additional sonographic evidence of acute cholecystitis   2.  Hepatomegaly   3.  Echogenic liver, compatible with fatty change versus fibrosis      IR-US " GUIDED PIV    (Results Pending)       ASSESSMENT AND PLAN:  Gallstone pancreatitis  Assessment & Plan  RUQ/epigastric pain with lipase >1000  RUQ US and MRI with multiple gallstones  Plan for lap norma  NPO/mIVF  IV abx      - Satisfactory progress POD # 1 laparoscopic cholecystectomy.   - Regular diet.   - Aggressive pulmonary toilet. Will benefit from breathing treatments. Respiratory therapy consult pending.   - Mobilize patient.   - Oral narcotics only.   - Follow up in outpatient ACS clinic in 2 weeks for post operative visit.   - ACS blue service will sign off.   - Please reach out with questions or concerns.        ____________________________________     Emily Mensah P.A.-C.    DD: 12/28/2024  8:19 AM

## 2024-12-28 NOTE — PROGRESS NOTES
Brought patient up to NICU in wheelchair to visit her baby around 10:30 AM. Brought patient back to [facility] at 11:15.

## 2024-12-28 NOTE — PROGRESS NOTES
"POD #1 laparoscopic cholecystectomy  POD # 4 s/p RLTCS at 36w2d for preeclampsia with severe features.    S:  Patient is doing ok.  She has not really been up and out of bed ambulating since the surgery.  She states that the oral pain medications do not work as well as the dilaudid. She states that she has had the return of her cough but is not using the incentive spirometer as ordered. Per the general surgery PA, there were two episodes of oxygen desaturation prior to starting the surgery and the patient required two doses of endotracheal tube administered albuterol which she responded to well. The patient has not really been to see the baby.  She reports moderate lochia.  She does not have a pump at the bedside and she has not really thought if she is going to breast feed.  Discussed need for her to stay another day to work on pain management, ambulation, and pulmonary cares.      O:  BP (!) 143/71   Pulse 72   Temp 36.5 °C (97.7 °F) (Temporal)   Resp 16   Ht 1.626 m (5' 4\")   Wt (!) 154 kg (340 lb)   SpO2 93%     A, A, and O x 3 NAD    FF u/1    No c/c/e    Incision: Clean/dry/intact.  Prevena wound VAC in place with scant output    Laparoscopic cholecystectomy incision sites: clean/dry/intact.  Dermabond noted at the sites.      Intake/Output Summary (Last 24 hours) at 12/28/2024 0913  Last data filed at 12/27/2024 1520  Gross per 24 hour   Intake 1030 ml   Output --   Net 1030 ml          Latest Reference Range & Units 12/28/24 02:36   WBC 4.8 - 10.8 K/uL 13.3 (H)   RBC 4.20 - 5.40 M/uL 2.93 (L)   Hemoglobin 12.0 - 16.0 g/dL 7.5 (L)   Hematocrit 37.0 - 47.0 % 24.5 (L)   MCV 81.4 - 97.8 fL 83.6   MCH 27.0 - 33.0 pg 25.6 (L)   MCHC 32.2 - 35.5 g/dL 30.6 (L)   RDW 35.9 - 50.0 fL 53.0 (H)   Platelet Count 164 - 446 K/uL 378   MPV 9.0 - 12.9 fL 9.5   Neutrophils-Polys 44.00 - 72.00 % 83.70 (H)   Neutrophils (Absolute) 1.82 - 7.42 K/uL 11.15 (H)   Lymphocytes 22.00 - 41.00 % 9.40 (L)   Lymphs (Absolute) 1.00 - " 4.80 K/uL 1.25   Monocytes 0.00 - 13.40 % 5.60   Monos (Absolute) 0.00 - 0.85 K/uL 0.75   Eosinophils 0.00 - 6.90 % 0.10   Eos (Absolute) 0.00 - 0.51 K/uL 0.01   Basophils 0.00 - 1.80 % 0.20   Baso (Absolute) 0.00 - 0.12 K/uL 0.02   Immature Granulocytes 0.00 - 0.90 % 1.00 (H)   Immature Granulocytes (abs) 0.00 - 0.11 K/uL 0.13 (H)   Nucleated RBC 0.00 - 0.20 /100 WBC 0.00   NRBC (Absolute) K/uL 0.00   Sodium 135 - 145 mmol/L 138   Potassium 3.6 - 5.5 mmol/L 4.4   Chloride 96 - 112 mmol/L 104   Co2 20 - 33 mmol/L 21   Anion Gap 7.0 - 16.0  13.0   Glucose 65 - 99 mg/dL 119 (H)   Bun 8 - 22 mg/dL 14   Creatinine 0.50 - 1.40 mg/dL 0.62   GFR (CKD-EPI) >60 mL/min/1.73 m 2 121   Calcium 8.5 - 10.5 mg/dL 7.8 (L)   Correct Calcium 8.5 - 10.5 mg/dL 9.0   AST(SGOT) 12 - 45 U/L 56 (H)   ALT(SGPT) 2 - 50 U/L 83 (H)   Alkaline Phosphatase 30 - 99 U/L 663 (H)   Total Bilirubin 0.1 - 1.5 mg/dL 0.3   Albumin 3.2 - 4.9 g/dL 2.5 (L)   Total Protein 6.0 - 8.2 g/dL 5.9 (L)   Globulin 1.9 - 3.5 g/dL 3.4   A-G Ratio g/dL 0.7   (H): Data is abnormally high  (L): Data is abnormally low    A/P: POD #1 s/p laparoscopic cholecystectomy for gallstone pancreatitis.  POD #4 s/p RLTCS at 36w2d for preeclampsia with severe features.    Hypertension - continue labetalol 100 mg bid.  Monitor for signs/symptoms of postpartum preeclampsia.  Elevated liver enzymes - imrpoving.  Pancreatitis - per general surgery - mild case and enzymes are traending down.  Ambulate TID.  ADAT.  Work on pain management with oral pain medications.  Patient to see baby multiple times today in the NICU to participate in care.  May need a wheelchair ride coordinated with NICU.  Patient seems at risk for postpartum depression.  Lactation consultant to see patient today - discussed with Jessy Kirkpatrick RN of L and D and she will place consultation.  Moderate acute blood loss anemia - continue Folitab.  Cough - laryngospasm in the OR upon intubation.  Surgery PA to place  consultation for respiratory therapy to see patient.  Patient to do IS 10x/hour.  Recent history of flu with cough.  Needs cough drops at bedside, tea with lemon/honey, and Tessalon Perles.  Anticipate D/C tomorrow and will need follow up with Dr. Collado in 1 week, 2 weeks, and 6 weeks for postpartum appointments.  Interdry to be provided for the patient.  Abdominal binder.  Heating pad.  Postoperative day #1 from laparoscopic cholecystectomy - will follow up with General surgery for postoperative appointment.  Needs a diet/nutrition consultation for morbid obesity and s/p lap norma.

## 2024-12-28 NOTE — CARE PLAN
The patient is Stable - Low risk of patient condition declining or worsening    Shift Goals  Clinical Goals: pain control, rest, monitor prevena  Patient Goals: comfort  Family Goals: Updates on POC    Progress made toward(s) clinical / shift goals:    Problem: Skin Integrity  Goal: Skin integrity is maintained or improved  Description: Target End Date:  Prior to discharge or change in level of care    Document interventions on Skin Risk/[place] flowsheet groups and corresponding LDA    1.  Assess and monitor skin integrity, appearance and/or temperature  2.  Assess risk factors for impaired skin integrity and/or pressures ulcers  3.  Implement precautions to protect skin integrity in collaboration with interdisciplinary team  4.  Implement pressure ulcer prevention protocol if at risk for skin breakdown  5.  Confirm wound care consult if at risk for skin breakdown  6.  Ensure patient use of pressure relieving devices  (Low air loss bed, waffle overlay, heel protectors, ROHO cushion, etc)  Outcome: Progressing     Problem: Pain  Goal: Patient's pain will be alleviated or reduced to the patient’s comfort goal  Description: Target End Date:  Prior to discharge or change in level of care    1.  Document pain using the appropriate pain scale per order or unit policy  2.  Administer pain medications per provider order and/or assist with epidural/spinal placement as needed  3.  Pain management medications as ordered  4.  Educate and implement non-pharmacologic comfort measures (i.e. relaxation, distraction, massage, cold/heat therapy, etc.)  5.  Assess for nonverbal signs of ineffective coping with pain and offer pain medication and/or epidural anesthesia  Outcome: Progressing

## 2024-12-28 NOTE — CARE PLAN
The patient is Stable - Low risk of patient condition declining or worsening    Shift Goals  Clinical Goals: Pain control, monitor Prevena drain  Patient Goals: Pain control, sleep and comfort  Family Goals: Updates on POC    Progress made toward(s) clinical / shift goals:  Patient offered pain modalities offered such as ambulation, medication. ice pack, heat pack, repositioning, and distraction for pain relief. TCDB teaching provided. Bed locked and in lowest position, with bed alarm on. Non skid socks provided, fall precautions in place per Fall screening flowsheet. Mobilization importance education provided, with fall education. Diet followed per order.  +Void, -BM. Teaching provided per POC, teachback to RN encouraged. Wound care provided per order, Prevena drain in place. Perineal pads monitored for blood output, small output throughout shift. Encouraged and reminded of turning q2 hours throughout shift.     Problem: Skin Integrity  Goal: Skin integrity is maintained or improved  Outcome: Progressing     Problem: Risk for Venous Thromboembolism (VTE)  Goal: VTE prevention measures will be implemented and patient will remain free from VTE  Outcome: Progressing     Problem: Risk for Infection and Impaired Wound Healing  Goal: Patient will remain free from infection  Outcome: Progressing     Problem: Pain  Goal: Patient's pain will be alleviated or reduced to the patient’s comfort goal  Outcome: Progressing     Problem: Knowledge Deficit - Standard  Goal: Patient and family/care givers will demonstrate understanding of plan of care, disease process/condition, diagnostic tests and medications  Outcome: Progressing       Patient is not progressing towards the following goals:

## 2024-12-28 NOTE — PROGRESS NOTES
4 Eyes Skin Assessment Completed by Zuri RN and Sol, RN.    Head WDL  Ears WDL  Nose WDL  Mouth WDL  Neck WDL  Breast/Chest WDL  Shoulder Blades WDL  Spine WDL  (R) Arm/Elbow/Hand WDL  (L) Arm/Elbow/Hand WDL  Abdomen x4 lap sites dermabond MEHDI, C section incision with prevena  Groin pad in place  Scrotum/Coccyx/Buttocks WDL  (R) Leg WDL  (L) Leg WDL  (R) Heel/Foot/Toe WDL  (L) Heel/Foot/Toe WDL          Devices In Places Blood Pressure Cuff, Pulse Ox, SCD's, and Nasal Cannula      Interventions In Place Gray Ear Foams, NC W/Ear Foams, Pillows, Dri-Troy Pads, and Heels Loaded W/Pillows    Possible Skin Injury No    Pictures Uploaded Into Epic N/A  Wound Consult Placed N/A  RN Wound Prevention Protocol Ordered No

## 2024-12-28 NOTE — LACTATION NOTE
Lactation Follow up:    Lactation consult reordered by OB.  Visited with Shelby in [facility] today to reassess motivation to continue breast pumping.  Shelby states she does not want to pump and appreciated the visit.      Lactation available as needed.

## 2024-12-29 LAB
ALBUMIN SERPL BCP-MCNC: 2.4 G/DL (ref 3.2–4.9)
ALBUMIN/GLOB SERPL: 0.7 G/DL
ALP SERPL-CCNC: 1068 U/L (ref 30–99)
ALT SERPL-CCNC: 75 U/L (ref 2–50)
AMYLASE SERPL-CCNC: 63 U/L (ref 20–103)
ANION GAP SERPL CALC-SCNC: 11 MMOL/L (ref 7–16)
ANION GAP SERPL CALC-SCNC: 12 MMOL/L (ref 7–16)
APPEARANCE UR: ABNORMAL
AST SERPL-CCNC: 70 U/L (ref 12–45)
BACTERIA #/AREA URNS HPF: ABNORMAL /HPF
BACTERIA BLD CULT: NORMAL
BACTERIA BLD CULT: NORMAL
BASOPHILS # BLD AUTO: 0.1 % (ref 0–1.8)
BASOPHILS # BLD: 0.02 K/UL (ref 0–0.12)
BILIRUB SERPL-MCNC: 0.4 MG/DL (ref 0.1–1.5)
BILIRUB UR QL STRIP.AUTO: NEGATIVE
BUN SERPL-MCNC: 11 MG/DL (ref 8–22)
BUN SERPL-MCNC: 12 MG/DL (ref 8–22)
CALCIUM ALBUM COR SERPL-MCNC: 9 MG/DL (ref 8.5–10.5)
CALCIUM SERPL-MCNC: 7.7 MG/DL (ref 8.5–10.5)
CALCIUM SERPL-MCNC: 8.2 MG/DL (ref 8.5–10.5)
CASTS URNS QL MICRO: ABNORMAL /LPF (ref 0–2)
CH50 SERPL-ACNC: 77 U/ML (ref 38.7–89.9)
CHLORIDE SERPL-SCNC: 106 MMOL/L (ref 96–112)
CHLORIDE SERPL-SCNC: 106 MMOL/L (ref 96–112)
CO2 SERPL-SCNC: 20 MMOL/L (ref 20–33)
CO2 SERPL-SCNC: 21 MMOL/L (ref 20–33)
COLOR UR: ABNORMAL
CREAT SERPL-MCNC: 0.74 MG/DL (ref 0.5–1.4)
CREAT SERPL-MCNC: 0.81 MG/DL (ref 0.5–1.4)
DSDNA AB TITR SER CLIF: NORMAL {TITER}
EKG IMPRESSION: NORMAL
EOSINOPHIL # BLD AUTO: 0.02 K/UL (ref 0–0.51)
EOSINOPHIL NFR BLD: 0.1 % (ref 0–6.9)
EPITHELIAL CELLS 1715: ABNORMAL /HPF (ref 0–5)
ERYTHROCYTE [DISTWIDTH] IN BLOOD BY AUTOMATED COUNT: 54.3 FL (ref 35.9–50)
FLUAV RNA SPEC QL NAA+PROBE: POSITIVE
FLUBV RNA SPEC QL NAA+PROBE: NEGATIVE
GFR SERPLBLD CREATININE-BSD FMLA CKD-EPI: 110 ML/MIN/1.73 M 2
GFR SERPLBLD CREATININE-BSD FMLA CKD-EPI: 99 ML/MIN/1.73 M 2
GLOBULIN SER CALC-MCNC: 3.4 G/DL (ref 1.9–3.5)
GLUCOSE SERPL-MCNC: 91 MG/DL (ref 65–99)
GLUCOSE SERPL-MCNC: 98 MG/DL (ref 65–99)
GLUCOSE UR STRIP.AUTO-MCNC: NEGATIVE MG/DL
HCT VFR BLD AUTO: 24.1 % (ref 37–47)
HGB BLD-MCNC: 7.5 G/DL (ref 12–16)
IMM GRANULOCYTES # BLD AUTO: 0.16 K/UL (ref 0–0.11)
IMM GRANULOCYTES NFR BLD AUTO: 1.1 % (ref 0–0.9)
KETONES UR STRIP.AUTO-MCNC: NEGATIVE MG/DL
LDH SERPL L TO P-CCNC: 227 U/L (ref 107–266)
LEUKOCYTE ESTERASE UR QL STRIP.AUTO: ABNORMAL
LIPASE SERPL-CCNC: 118 U/L (ref 11–82)
LYMPHOCYTES # BLD AUTO: 1.81 K/UL (ref 1–4.8)
LYMPHOCYTES NFR BLD: 11.9 % (ref 22–41)
MCH RBC QN AUTO: 26.1 PG (ref 27–33)
MCHC RBC AUTO-ENTMCNC: 31.1 G/DL (ref 32.2–35.5)
MCV RBC AUTO: 84 FL (ref 81.4–97.8)
MICRO URNS: ABNORMAL
MONOCYTES # BLD AUTO: 1.42 K/UL (ref 0–0.85)
MONOCYTES NFR BLD AUTO: 9.3 % (ref 0–13.4)
NEUTROPHILS # BLD AUTO: 11.78 K/UL (ref 1.82–7.42)
NEUTROPHILS NFR BLD: 77.5 % (ref 44–72)
NITRITE UR QL STRIP.AUTO: NEGATIVE
NRBC # BLD AUTO: 0 K/UL
NRBC BLD-RTO: 0 /100 WBC (ref 0–0.2)
PH UR STRIP.AUTO: 5.5 [PH] (ref 5–8)
PLATELET # BLD AUTO: 392 K/UL (ref 164–446)
PMV BLD AUTO: 9.2 FL (ref 9–12.9)
POTASSIUM SERPL-SCNC: 3.8 MMOL/L (ref 3.6–5.5)
POTASSIUM SERPL-SCNC: 3.9 MMOL/L (ref 3.6–5.5)
PROT SERPL-MCNC: 5.8 G/DL (ref 6–8.2)
PROT UR QL STRIP: 30 MG/DL
RBC # BLD AUTO: 2.87 M/UL (ref 4.2–5.4)
RBC # URNS HPF: >100 /HPF (ref 0–2)
RBC UR QL AUTO: ABNORMAL
RSV RNA SPEC QL NAA+PROBE: NEGATIVE
SARS-COV-2 RNA RESP QL NAA+PROBE: NOTDETECTED
SIGNIFICANT IND 70042: NORMAL
SIGNIFICANT IND 70042: NORMAL
SITE SITE: NORMAL
SITE SITE: NORMAL
SODIUM SERPL-SCNC: 138 MMOL/L (ref 135–145)
SODIUM SERPL-SCNC: 138 MMOL/L (ref 135–145)
SOURCE SOURCE: NORMAL
SOURCE SOURCE: NORMAL
SP GR UR STRIP.AUTO: 1.02
SPECIMEN SOURCE: ABNORMAL
TRIGL SERPL-MCNC: 81 MG/DL (ref 0–149)
UROBILINOGEN UR STRIP.AUTO-MCNC: 1 EU/DL
WBC # BLD AUTO: 15.2 K/UL (ref 4.8–10.8)
WBC #/AREA URNS HPF: ABNORMAL /HPF

## 2024-12-29 PROCEDURE — 0241U HCHG SARS-COV-2 COVID-19 NFCT DS RESP RNA 4 TRGT MIC: CPT

## 2024-12-29 PROCEDURE — 700102 HCHG RX REV CODE 250 W/ 637 OVERRIDE(OP): Performed by: OBSTETRICS & GYNECOLOGY

## 2024-12-29 PROCEDURE — 93010 ELECTROCARDIOGRAM REPORT: CPT | Performed by: INTERNAL MEDICINE

## 2024-12-29 PROCEDURE — 83690 ASSAY OF LIPASE: CPT

## 2024-12-29 PROCEDURE — 700102 HCHG RX REV CODE 250 W/ 637 OVERRIDE(OP): Performed by: PHYSICIAN ASSISTANT

## 2024-12-29 PROCEDURE — 85025 COMPLETE CBC W/AUTO DIFF WBC: CPT

## 2024-12-29 PROCEDURE — A9270 NON-COVERED ITEM OR SERVICE: HCPCS | Performed by: OBSTETRICS & GYNECOLOGY

## 2024-12-29 PROCEDURE — 700105 HCHG RX REV CODE 258: Performed by: STUDENT IN AN ORGANIZED HEALTH CARE EDUCATION/TRAINING PROGRAM

## 2024-12-29 PROCEDURE — 84478 ASSAY OF TRIGLYCERIDES: CPT

## 2024-12-29 PROCEDURE — 87086 URINE CULTURE/COLONY COUNT: CPT

## 2024-12-29 PROCEDURE — 770001 HCHG ROOM/CARE - MED/SURG/GYN PRIV*

## 2024-12-29 PROCEDURE — 81001 URINALYSIS AUTO W/SCOPE: CPT

## 2024-12-29 PROCEDURE — 82150 ASSAY OF AMYLASE: CPT

## 2024-12-29 PROCEDURE — 80053 COMPREHEN METABOLIC PANEL: CPT

## 2024-12-29 PROCEDURE — A9270 NON-COVERED ITEM OR SERVICE: HCPCS | Performed by: PHYSICIAN ASSISTANT

## 2024-12-29 PROCEDURE — 83615 LACTATE (LD) (LDH) ENZYME: CPT

## 2024-12-29 RX ORDER — OSELTAMIVIR PHOSPHATE 75 MG/1
75 CAPSULE ORAL 2 TIMES DAILY
Status: DISCONTINUED | OUTPATIENT
Start: 2024-12-29 | End: 2024-12-30 | Stop reason: HOSPADM

## 2024-12-29 RX ORDER — IBUPROFEN 600 MG/1
600 TABLET, FILM COATED ORAL EVERY 6 HOURS PRN
Status: DISCONTINUED | OUTPATIENT
Start: 2024-12-29 | End: 2024-12-30 | Stop reason: HOSPADM

## 2024-12-29 RX ORDER — NIFEDIPINE 30 MG/1
30 TABLET, EXTENDED RELEASE ORAL
Status: DISCONTINUED | OUTPATIENT
Start: 2024-12-29 | End: 2024-12-30 | Stop reason: HOSPADM

## 2024-12-29 RX ADMIN — OXYCODONE 5 MG: 5 TABLET ORAL at 11:25

## 2024-12-29 RX ADMIN — GUAIFENESIN 600 MG: 600 TABLET, EXTENDED RELEASE ORAL at 08:32

## 2024-12-29 RX ADMIN — OSELTAMIVIR PHOSPHATE 75 MG: 75 CAPSULE ORAL at 04:40

## 2024-12-29 RX ADMIN — OXYCODONE HYDROCHLORIDE 10 MG: 10 TABLET ORAL at 00:32

## 2024-12-29 RX ADMIN — OSELTAMIVIR PHOSPHATE 75 MG: 75 CAPSULE ORAL at 17:10

## 2024-12-29 RX ADMIN — NIFEDIPINE 30 MG: 30 TABLET, EXTENDED RELEASE ORAL at 11:26

## 2024-12-29 RX ADMIN — SODIUM CHLORIDE, POTASSIUM CHLORIDE, SODIUM LACTATE AND CALCIUM CHLORIDE: 600; 310; 30; 20 INJECTION, SOLUTION INTRAVENOUS at 23:13

## 2024-12-29 RX ADMIN — Medication 1 LOZENGE: at 08:32

## 2024-12-29 RX ADMIN — OXYCODONE HYDROCHLORIDE 10 MG: 10 TABLET ORAL at 18:11

## 2024-12-29 RX ADMIN — GUAIFENESIN 600 MG: 600 TABLET, EXTENDED RELEASE ORAL at 21:35

## 2024-12-29 ASSESSMENT — PAIN DESCRIPTION - PAIN TYPE
TYPE: ACUTE PAIN
TYPE: SURGICAL PAIN
TYPE: ACUTE PAIN

## 2024-12-29 NOTE — PROGRESS NOTES
"POD #2 laparoscopic cholecystectomy  POD # 5 s/p RLTCS at 36w2d for preeclampsia with severe features.    S:  Doing ok.  Overnight, she developed a fever and had a temperature up to 102.6 at 2100 on 12/28/2024.  She is reporting 4/10 pain and believes it is mostly gas pain.  Her cough has improved.  She was informed that she will be discharged to home likely tomorrow morning if she remains afebrile. She has moderate lochia. She is ambulating and voiding spontaneously.  She is passing gas.  She is planning to bottle feed the baby only.  She denies postpartum depression.      O:  BP (!) 155/70   Pulse 93   Temp 36.7 °C (98.1 °F) (Oral)   Resp 18   Ht 1.626 m (5' 4\")   Wt (!) 154 kg (340 lb)   SpO2 95%     A, A, and O x 3 NAD    FF u/1    Abdomen is obese and non-tender to palpation.    Incisions: clean/dry/intact and Prevena wound VAC is in place.  Scant output.    Extremities: no c/c/e     Latest Reference Range & Units 12/29/24 03:05   WBC 4.8 - 10.8 K/uL 15.2 (H)   RBC 4.20 - 5.40 M/uL 2.87 (L)   Hemoglobin 12.0 - 16.0 g/dL 7.5 (L)   Hematocrit 37.0 - 47.0 % 24.1 (L)   MCV 81.4 - 97.8 fL 84.0   MCH 27.0 - 33.0 pg 26.1 (L)   MCHC 32.2 - 35.5 g/dL 31.1 (L)   RDW 35.9 - 50.0 fL 54.3 (H)   Platelet Count 164 - 446 K/uL 392   MPV 9.0 - 12.9 fL 9.2   Neutrophils-Polys 44.00 - 72.00 % 77.50 (H)   Neutrophils (Absolute) 1.82 - 7.42 K/uL 11.78 (H)   Lymphocytes 22.00 - 41.00 % 11.90 (L)   Lymphs (Absolute) 1.00 - 4.80 K/uL 1.81   Monocytes 0.00 - 13.40 % 9.30   Monos (Absolute) 0.00 - 0.85 K/uL 1.42 (H)   Eosinophils 0.00 - 6.90 % 0.10   Eos (Absolute) 0.00 - 0.51 K/uL 0.02   Basophils 0.00 - 1.80 % 0.10   Baso (Absolute) 0.00 - 0.12 K/uL 0.02   Immature Granulocytes 0.00 - 0.90 % 1.10 (H)   Immature Granulocytes (abs) 0.00 - 0.11 K/uL 0.16 (H)   Nucleated RBC 0.00 - 0.20 /100 WBC 0.00   NRBC (Absolute) K/uL 0.00   Sodium 135 - 145 mmol/L 138   Potassium 3.6 - 5.5 mmol/L 3.8   Chloride 96 - 112 mmol/L 106   Co2 20 - " 33 mmol/L 20   Anion Gap 7.0 - 16.0  12.0   Glucose 65 - 99 mg/dL 91   Bun 8 - 22 mg/dL 11   Creatinine 0.50 - 1.40 mg/dL 0.74   GFR (CKD-EPI) >60 mL/min/1.73 m 2 110   Calcium 8.5 - 10.5 mg/dL 7.7 (L)   Correct Calcium 8.5 - 10.5 mg/dL 9.0   AST(SGOT) 12 - 45 U/L 70 (H)   ALT(SGPT) 2 - 50 U/L 75 (H)   Alkaline Phosphatase 30 - 99 U/L 1068 (H)   Total Bilirubin 0.1 - 1.5 mg/dL 0.4   Albumin 3.2 - 4.9 g/dL 2.4 (L)   Total Protein 6.0 - 8.2 g/dL 5.8 (L)   Globulin 1.9 - 3.5 g/dL 3.4   A-G Ratio g/dL 0.7   Lipase 11 - 82 U/L 118 (H)   Amylase 20 - 103 U/L 63   LDH Total 107 - 266 U/L 227   (H): Data is abnormally high  (L): Data is abnormally low     Latest Reference Range & Units 12/29/24 01:12   Influenza virus A RNA Negative  POSITIVE !   Influenza virus B, PCR Negative  Negative   RSV, PCR Negative  Negative   SARS-CoV-2 by PCR  NotDetected   SARS-CoV-2 Source  NP Swab   !: Data is abnormal    12/28/2024 10:59 PM     HISTORY/REASON FOR EXAM:  Fever.     TECHNIQUE/EXAM DESCRIPTION AND NUMBER OF VIEWS:  Single portable view of the chest.     COMPARISON: None     FINDINGS:  Cardiomediastinal contour is within normal limits.  Lungs show hypoinflation.  No focal pulmonary consolidation.  No pleural fluid collection or pneumothorax.  No major bony abnormality is seen.     IMPRESSION:     Hypoinflation without other evidence for acute cardiopulmonary disease.    A/P: 28 yo  who is     POD #2 laparoscopic cholecystectomy  POD # 5 s/p RLTCS at 36w2d for preeclampsia with severe features.    Hypertension - Begin Nifedipine XL 30 mg daily.  Patient has not been on BP medications but per nephrology if BP is elevated, ok to start nifedipine.  Monitor for signs/symptoms of postpartum preeclampsia.  Elevated liver enzymes - imrpoving.  Pancreatitis - per general surgery - mild case and enzymes are traending down.  Ambulate TID.  ADAT.  Work on pain management with oral pain medications.  Patient to see baby multiple times  today in the NICU to participate in care.  May need a wheelchair ride coordinated with NICU.  Patient seems at risk for postpartum depression.  Lactation consultant to see patient today - discussed with Jessy Kirkpatrick  RN of L and D and she will place consultation.  Moderate acute blood loss anemia - continue Folitab.  Cough - laryngospasm in the OR upon intubation.  Surgery PA to place consultation for respiratory therapy to see patient.  Patient to do IS 10x/hour.  Recent history of flu with cough.  Needs cough drops at bedside, tea with lemon/honey, and Tessalon Perles.  Anticipate D/C tomorrow and will need follow up with Dr. Collado in 1 week, 2 weeks, and 6 weeks for postpartum appointments.  Interdry to be provided for the patient.  Abdominal binder.  Heating pad.  Postoperative day #1 from laparoscopic cholecystectomy - will follow up with General surgery for postoperative appointment.  Needs a diet/nutrition consultation for morbid obesity and s/p lap norma.  Fever to 102.6 at 2100 on 12/28/2024 - Tylenol and Tamiflu.  Symptom cares.

## 2024-12-29 NOTE — PROGRESS NOTES
"Bedside report received.  Assessment complete.  A&O x 4. Patient calls appropriately.  Patient ambulates with a standby assist.   Patient has 8/10 pain. Patient medicated per MAR.  Denies N&V. Tolerating regular diet.  Patient has a transverse incision with prevena wound vac in place.  + void, + flatus, - BM (last bowel movement 12/24 prior to arrival).  Patient denies SOB. On room air. Patient does have a strong cough.   SCD's on.    BP (!) 141/85 Comment: Micah Saul notified   Pulse (!) 115 Comment: Micah Saul notified   Temp 37 °C (98.6 °F) (Temporal)   Resp 17   Ht 1.626 m (5' 4\")   Wt (!) 154 kg (340 lb)   SpO2 95%   Breastfeeding No   BMI 58.36 kg/m²     Review plan with of care with patient. Call light and personal belongings within reach. Hourly rounding in place. All needs met at this time.  "

## 2024-12-29 NOTE — PROGRESS NOTES
Bedside report received from NOC RN.  Assessment complete.  A&O x 4. Patient calls appropriately.  Patient ambulates with a standby assist.   Patient has 4/10 pain. Patient repositioned, Patient edge of bed to eat breakfast  Denies N&V. Tolerating regular diet.  Patient has a transverse incision with prevena wound vac in place.  + void, + flatus, - BM (last bowel movement 12/24 prior to arrival).  Patient denies SOB. On room air. Patient has cough noted. (+) for influenza A, mucinex given  SCD's on.    Review plan with of care with patient. Call light and personal belongings within reach. Hourly rounding in place. All needs met at this time.

## 2024-12-29 NOTE — CARE PLAN
The patient is Stable - Low risk of patient condition declining or worsening    Shift Goals  Clinical Goals: pain management, pulmonary hygiene  Patient Goals: pain management, rest  Family Goals: Updates on POC    Progress made toward(s) clinical / shift goals: Pain managed with pharmacological and non-pharmacological pain management methods throughout shift. Educated patient on importance of pulmonary hygiene. Patient verbalized understanding. Patient demonstrated deep breathing and coughing. Patient demonstrated correct use of the IS. Encouraged patient to use the IS ten times per hour. Patient rested comfortably intermittently throughout shift. Patient and family updated on plan of care.       Problem: Pain  Goal: Patient's pain will be alleviated or reduced to the patient’s comfort goal  Outcome: Progressing     Problem: Respiratory  Goal: Patient will achieve/maintain optimum respiratory ventilation and gas exchange  Outcome: Progressing       Patient is not progressing towards the following goals:

## 2024-12-29 NOTE — PROGRESS NOTES
Paged out to Dr. Suzanne Buckley at [phone] regarding change elevated heart rate 120's to 130's and elevated temperature of 102.6.     Return call received 2130. Order's received.

## 2024-12-29 NOTE — PROGRESS NOTES
"[facility] -  PROGRESS NOTE               Author: Kirstie Dunn M.D. Date & Time: 2024  11:05 AM     HPI:  Patient is a 29 year old female with a a PMHx of 2 days post op from , uncomplicated surgery, history of morbid obesity BMI 58 presented for delivery of her baby on 2024.  On presentation she report nausea/vomiting, persistent and abdominal pain.  He she was found to have elevated LFTs and concern for pre-eclampsia with HTN.  Also pancreatitis with amylase 717 and lipase 0.  Concern that her pancreatitis was caused by gallstone pancreatitis.  C section had 500cc of blood loss.  Planned for possibly cholecystectomy.  SBP did drop to the 90s on .  Creatinine was 0.3.  Creatinine today 1.3.  UPCR of 827 on .  UA prior to delivery with trace protein, negative blood, few bacteria.  MRI done  with mild right hydro, likely hydro from pregnancy.   Patient denies illicits.  Denies significant nsaid usage prior.  No known history of CKD prior.  Denies family history of ckd and denies history of renal stones or utis.        DAILY NEPHROLOGY SUMMARY:   - Cr 1.1 down from 1.3.  No new complaints today.  White count increased.  Hematology reviewed labs, doesn't think hemolysis. UPCR downtrending to 202  : NAEON,Cr down 1.1->0.6.Hb low 7.5, plt stable, hapto 261  : Patient found to have influenza infection.Feels well, no complaints, pain controlled,urinating well. BP intermittently elevated. Cr down to baseline.    REVIEW OF SYSTEMS:    10 point ROS reviewed and is as per HPI/daily summary or otherwise negative    PMH/PSH/SH/FH:   Reviewed and unchanged since admission note    CURRENT MEDICATIONS:   Reviewed from admission to present day    VS:  BP (!) 155/70 Comment: rn notified   Pulse 93   Temp 36.7 °C (98.1 °F) (Oral)   Resp 18   Ht 1.626 m (5' 4\")   Wt (!) 154 kg (340 lb)   SpO2 95%   Breastfeeding No   BMI 58.36 kg/m²     Physical " Exam  Constitutional:       Appearance: She is obese.   HENT:      Head: Normocephalic.      Right Ear: External ear normal.      Left Ear: External ear normal.   Eyes:      General: No scleral icterus.  Cardiovascular:      Rate and Rhythm: Normal rate.      Pulses: Normal pulses.   Abdominal:      General: There is distension.      Tenderness: There is abdominal tenderness.   Musculoskeletal:         General: Swelling present.   Skin:     General: Skin is warm.   Neurological:      Mental Status: She is alert and oriented to person, place, and time.   Psychiatric:         Mood and Affect: Mood normal.   Fluids:  In: 620 [P.O.:620]  Out: -     LABS:  Recent Labs     12/28/24  0236 12/28/24  2328 12/29/24  0305   SODIUM 138 138 138   POTASSIUM 4.4 3.9 3.8   CHLORIDE 104 106 106   CO2 21 21 20   GLUCOSE 119* 98 91   BUN 14 12 11   CREATININE 0.62 0.81 0.74   CALCIUM 7.8* 8.2* 7.7*       IMAGING:   All imaging reviewed from admission to present day    IMPRESSION:  # JOSE CARLOS    - Cr trended up from 0.3 to 1.3 now, slightly down trending    - Unclear etiology but risk factors include poor perfusion vs other etiologies continue work up    - hematology doesn't think hemolysis based on labs    - Renal u/s Slight enlargement of left kidney otherwise unremarkable bilateral renal ultrasound.     - UA with some trace blood but wasn't clean catch, some hyaline casts    - Agree with IVF for now, agree with stopping NSAIDs  # Pre-eclampsia    - UPCR downtrending  # Elevated LFTs  # Pancreatitis    - Being seen by surgery    - Management per primary team  # HTN    - If SBP remains elevated can consider nifedipine  # Anemia    - Management per primary team    - Smear no evidence of hemolysis, LDH,hapto, bili, C3/C4 WNL     PLAN:  - Hold off further IVF, encourage PO- JOSE CARLOS resolved  - Agree with holding NSAIDs  - Defer blood transfusion parameters/requirements to primary team  - Can uptitrate as needed on Nifedipine  - pending ds dna,  anca  - Haptoglobin pending  - Dose all meds per eGFR   - will arrange OP renal follow up    We will sign off. Please reach outt o us with question or if serological tests are abnormal.

## 2024-12-29 NOTE — CARE PLAN
The patient is Stable - Low risk of patient condition declining or worsening    Shift Goals  Clinical Goals: pain control, pulmonary hygiene, OOB activity  Patient Goals: pain control  Family Goals: Updates on POC    Progress made toward(s) clinical / shift goals:    Problem: Pain  Goal: Patient's pain will be alleviated or reduced to the patient’s comfort goal  Description: Target End Date:  Prior to discharge or change in level of care    1.  Document pain using the appropriate pain scale per order or unit policy  2.  Administer pain medications per provider order and/or assist with epidural/spinal placement as needed  3.  Pain management medications as ordered  4.  Educate and implement non-pharmacologic comfort measures (i.e. relaxation, distraction, massage, cold/heat therapy, etc.)  5.  Assess for nonverbal signs of ineffective coping with pain and offer pain medication and/or epidural anesthesia  Outcome: Progressing     Problem: Respiratory  Goal: Patient will achieve/maintain optimum respiratory ventilation and gas exchange  Description: Target End Date:  Prior to discharge or change in level of care    Document on Assessment flowsheet    1.  Assess and monitor rate, rhythm, depth and effort of respiration  2.  Breath sounds assessed qshift and/or as needed  3.  Assess O2 saturation, administer/titrate oxygen as ordered  4.  Position patient for maximum ventilatory efficiency  5.  Turn, cough, and deep breath with splinting to improve effectiveness  6.  Collaborate with RT to administer medication/treatments per order  7.  Encourage use of incentive spirometer and encourage patient to cough after use and utilize splinting techniques if applicable  8.  Airway suctioning  9.  Monitor sputum production for changes in color, consistency and frequency  10. Perform frequent oral hygiene  11. Alternate physical activity with rest periods  Outcome: Progressing

## 2024-12-30 ENCOUNTER — PHARMACY VISIT (OUTPATIENT)
Dept: PHARMACY | Facility: MEDICAL CENTER | Age: 29
End: 2024-12-30
Payer: COMMERCIAL

## 2024-12-30 VITALS
OXYGEN SATURATION: 96 % | RESPIRATION RATE: 20 BRPM | HEIGHT: 64 IN | DIASTOLIC BLOOD PRESSURE: 65 MMHG | BODY MASS INDEX: 50.02 KG/M2 | TEMPERATURE: 98 F | SYSTOLIC BLOOD PRESSURE: 129 MMHG | HEART RATE: 89 BPM | WEIGHT: 293 LBS

## 2024-12-30 LAB
ALBUMIN SERPL BCP-MCNC: 2.5 G/DL (ref 3.2–4.9)
ALBUMIN/GLOB SERPL: 0.8 G/DL
ALP SERPL-CCNC: 726 U/L (ref 30–99)
ALT SERPL-CCNC: 51 U/L (ref 2–50)
AMYLASE SERPL-CCNC: 56 U/L (ref 20–103)
ANION GAP SERPL CALC-SCNC: 11 MMOL/L (ref 7–16)
AST SERPL-CCNC: 28 U/L (ref 12–45)
BASOPHILS # BLD AUTO: 0.2 % (ref 0–1.8)
BASOPHILS # BLD: 0.03 K/UL (ref 0–0.12)
BILIRUB SERPL-MCNC: 0.4 MG/DL (ref 0.1–1.5)
BUN SERPL-MCNC: 8 MG/DL (ref 8–22)
CALCIUM ALBUM COR SERPL-MCNC: 9 MG/DL (ref 8.5–10.5)
CALCIUM SERPL-MCNC: 7.8 MG/DL (ref 8.5–10.5)
CHLORIDE SERPL-SCNC: 103 MMOL/L (ref 96–112)
CO2 SERPL-SCNC: 22 MMOL/L (ref 20–33)
CREAT SERPL-MCNC: 0.63 MG/DL (ref 0.5–1.4)
EOSINOPHIL # BLD AUTO: 0.03 K/UL (ref 0–0.51)
EOSINOPHIL NFR BLD: 0.2 % (ref 0–6.9)
ERYTHROCYTE [DISTWIDTH] IN BLOOD BY AUTOMATED COUNT: 53.5 FL (ref 35.9–50)
GFR SERPLBLD CREATININE-BSD FMLA CKD-EPI: 121 ML/MIN/1.73 M 2
GLOBULIN SER CALC-MCNC: 3.3 G/DL (ref 1.9–3.5)
GLUCOSE SERPL-MCNC: 94 MG/DL (ref 65–99)
HCT VFR BLD AUTO: 23.9 % (ref 37–47)
HGB BLD-MCNC: 7.4 G/DL (ref 12–16)
IMM GRANULOCYTES # BLD AUTO: 0.28 K/UL (ref 0–0.11)
IMM GRANULOCYTES NFR BLD AUTO: 1.8 % (ref 0–0.9)
LDH SERPL L TO P-CCNC: 241 U/L (ref 107–266)
LIPASE SERPL-CCNC: 118 U/L (ref 11–82)
LYMPHOCYTES # BLD AUTO: 2.46 K/UL (ref 1–4.8)
LYMPHOCYTES NFR BLD: 15.5 % (ref 22–41)
MCH RBC QN AUTO: 26.1 PG (ref 27–33)
MCHC RBC AUTO-ENTMCNC: 31 G/DL (ref 32.2–35.5)
MCV RBC AUTO: 84.5 FL (ref 81.4–97.8)
MONOCYTES # BLD AUTO: 1.3 K/UL (ref 0–0.85)
MONOCYTES NFR BLD AUTO: 8.2 % (ref 0–13.4)
NEUTROPHILS # BLD AUTO: 11.81 K/UL (ref 1.82–7.42)
NEUTROPHILS NFR BLD: 74.1 % (ref 44–72)
NRBC # BLD AUTO: 0.02 K/UL
NRBC BLD-RTO: 0.1 /100 WBC (ref 0–0.2)
PLATELET # BLD AUTO: 422 K/UL (ref 164–446)
PMV BLD AUTO: 9.6 FL (ref 9–12.9)
POTASSIUM SERPL-SCNC: 3.4 MMOL/L (ref 3.6–5.5)
PROT SERPL-MCNC: 5.8 G/DL (ref 6–8.2)
RBC # BLD AUTO: 2.83 M/UL (ref 4.2–5.4)
SODIUM SERPL-SCNC: 136 MMOL/L (ref 135–145)
TRIGL SERPL-MCNC: 99 MG/DL (ref 0–149)
WBC # BLD AUTO: 15.9 K/UL (ref 4.8–10.8)

## 2024-12-30 PROCEDURE — RXMED WILLOW AMBULATORY MEDICATION CHARGE: Performed by: OBSTETRICS & GYNECOLOGY

## 2024-12-30 PROCEDURE — 83615 LACTATE (LD) (LDH) ENZYME: CPT

## 2024-12-30 PROCEDURE — 85025 COMPLETE CBC W/AUTO DIFF WBC: CPT

## 2024-12-30 PROCEDURE — 83690 ASSAY OF LIPASE: CPT

## 2024-12-30 PROCEDURE — 700102 HCHG RX REV CODE 250 W/ 637 OVERRIDE(OP): Performed by: OBSTETRICS & GYNECOLOGY

## 2024-12-30 PROCEDURE — 80053 COMPREHEN METABOLIC PANEL: CPT

## 2024-12-30 PROCEDURE — A9270 NON-COVERED ITEM OR SERVICE: HCPCS | Performed by: OBSTETRICS & GYNECOLOGY

## 2024-12-30 PROCEDURE — 84478 ASSAY OF TRIGLYCERIDES: CPT

## 2024-12-30 PROCEDURE — 82150 ASSAY OF AMYLASE: CPT

## 2024-12-30 RX ORDER — SIMETHICONE 125 MG
125 TABLET,CHEWABLE ORAL 3 TIMES DAILY PRN
Qty: 120 TABLET | Refills: 1 | Status: SHIPPED | OUTPATIENT
Start: 2024-12-30

## 2024-12-30 RX ORDER — OXYCODONE HYDROCHLORIDE 10 MG/1
10 TABLET ORAL EVERY 4 HOURS PRN
Qty: 28 TABLET | Refills: 0 | Status: SHIPPED | OUTPATIENT
Start: 2024-12-30 | End: 2025-01-06

## 2024-12-30 RX ORDER — GUAIFENESIN 600 MG/1
600 TABLET, EXTENDED RELEASE ORAL EVERY 12 HOURS
Qty: 28 TABLET | Refills: 1 | Status: SHIPPED | OUTPATIENT
Start: 2024-12-30

## 2024-12-30 RX ORDER — SIMETHICONE 125 MG
125 TABLET,CHEWABLE ORAL 3 TIMES DAILY PRN
Status: DISCONTINUED | OUTPATIENT
Start: 2024-12-30 | End: 2024-12-30 | Stop reason: HOSPADM

## 2024-12-30 RX ORDER — NIFEDIPINE 30 MG/1
30 TABLET, EXTENDED RELEASE ORAL DAILY
Qty: 30 TABLET | Refills: 1 | Status: SHIPPED | OUTPATIENT
Start: 2024-12-31

## 2024-12-30 RX ORDER — ACETAMINOPHEN 500 MG
1000 TABLET ORAL EVERY 6 HOURS PRN
Qty: 30 TABLET | Refills: 0 | Status: SHIPPED | OUTPATIENT
Start: 2024-12-30

## 2024-12-30 RX ORDER — BENZONATATE 100 MG/1
100 CAPSULE ORAL 3 TIMES DAILY PRN
Qty: 60 CAPSULE | Refills: 0 | Status: SHIPPED | OUTPATIENT
Start: 2024-12-30

## 2024-12-30 RX ORDER — OSELTAMIVIR PHOSPHATE 75 MG/1
75 CAPSULE ORAL 2 TIMES DAILY
Qty: 7 CAPSULE | Refills: 0 | Status: ACTIVE | OUTPATIENT
Start: 2024-12-30

## 2024-12-30 RX ORDER — IBUPROFEN 600 MG/1
600 TABLET, FILM COATED ORAL EVERY 6 HOURS PRN
Qty: 30 TABLET | Refills: 1 | Status: SHIPPED | OUTPATIENT
Start: 2024-12-30

## 2024-12-30 RX ADMIN — GUAIFENESIN 600 MG: 600 TABLET, EXTENDED RELEASE ORAL at 08:39

## 2024-12-30 RX ADMIN — ACETAMINOPHEN 1000 MG: 500 TABLET ORAL at 03:55

## 2024-12-30 RX ADMIN — NIFEDIPINE 30 MG: 30 TABLET, EXTENDED RELEASE ORAL at 04:56

## 2024-12-30 RX ADMIN — OSELTAMIVIR PHOSPHATE 75 MG: 75 CAPSULE ORAL at 04:56

## 2024-12-30 RX ADMIN — Medication 1 TABLET: at 10:41

## 2024-12-30 ASSESSMENT — PAIN DESCRIPTION - PAIN TYPE
TYPE: ACUTE PAIN
TYPE: ACUTE PAIN

## 2024-12-30 NOTE — DISCHARGE SUMMARY
Discharge Summary:      Shelby Calderon    Admit Date:   2024  Discharge Date:  2024     Admitting diagnosis:  Indication for care in labor or delivery [O75.9]  Discharge Diagnosis: Status post RLTCS at 36w2d for preeclampsia with severe features on 2024 and S/P laparoscopic cholecystectomy on 2024 for gallstone pancreatitis without evidence of choledocholithiasis  Influenza A - on Tamiflu and symptom cares.  Moderate acute blood loss anemia - on Folitab.    MFM consultation - Dr. Jansen.  Nephrology consultation - Dr. Zamora  General surgery consultation - Dr. Telles  General surgeon performing the laparoscopic cholecystectomy - Dr. Jose Deleon    PREOPERATIVE DIAGNOSES:  1.  36 and 2/7th week gestation.  2.  Previous  section.  3.  Maternal obesity.  4.  Preeclampsia with severe features (severe hypertension, elevated liver   enzymes).  5.  Cholelithiasis.  6.  Acute pancreatitis.     POSTOPERATIVE DIAGNOSES:  1.  36 and 2/7th week gestation.  2.  Previous  section.  3.  Maternal obesity.  4.  Preeclampsia with severe features (severe hypertension, elevated liver   enzymes).  5.  Cholelithiasis.  6.  Acute pancreatitis.     History:  Past Medical History:   Diagnosis Date    BMI 50.0-59.9, adult (HCC)      OB History    Para Term  AB Living   2 2 1 1   2   SAB IAB Ectopic Molar Multiple Live Births           0 2      # Outcome Date GA Lbr Johan/2nd Weight Sex Type Anes PTL Lv   2  24 36w2d  2.75 kg (6 lb 1 oz) M CS-LTranv Spinal Y ALLYSON   1 Term 24 40w0d  3.6 kg (7 lb 15 oz) M CS-LTranv EPI N ALLYSON      Complications: Failure to Progress in Second Stage        Patient has no known allergies.  Patient Active Problem List    Diagnosis Date Noted    Gallstone pancreatitis 2024    Indication for care in labor or delivery 2024        Hospital Course:   29 y.o. , now para 2, was admitted with the above mentioned  diagnosis, underwent RLTCS for preeclampsia with severe features. Patient also underwent a laparoscopic cholecystectomy for gallstone pancreatitis and had consultations with general surgery, perinatology/MFM, and nephrology.   Patient postpartum course was complicated by a fever of 102.6 at 2100 on 12/28/2024 and she was diagnosed with influenza A.  Fever work up was otherwise within normal limits.  Normal ECG (sinus tachycardia), chest X-ray which demonstrated hypoinflation but no evidence of pneumonia, and negative urine and blood cultures.  She now has had progressive advancement in diet , ambulation and toleration of oral analgesia. Patient without complaints today and desires discharge.  She is planning to bottle feed only but was seen by lactation consultant.  She is unable to visit the baby in the NICU secondary to the Influenza A infection.    Vitals:    12/29/24 2311 12/30/24 0341 12/30/24 0500 12/30/24 0839   BP: 114/68 (!) 144/83  129/65   Pulse: (!) 118 (!) 111  89   Resp: 18 18  20   Temp: 37.9 °C (100.3 °F) (!) 38.1 °C (100.6 °F) 37.2 °C (98.9 °F) 36.7 °C (98 °F)   TempSrc: Oral Oral Oral Oral   SpO2: 93% 95%  96%   Weight:       Height:           Current Facility-Administered Medications   Medication Dose    simethicone (Mylicon) chewable tablet 125 mg  125 mg    oseltamivir (Tamiflu) capsule 75 mg  75 mg    ibuprofen (Motrin) tablet 600 mg  600 mg    NIFEdipine SR (Procadia-XL) tablet 30 mg  30 mg    benzonatate (Tessalon) capsule 100 mg  100 mg    menthol (Halls) lozenge 1 Lozenge  1 Lozenge    Respiratory Therapy Consult      acetaminophen (Tylenol) tablet 1,000 mg  1,000 mg    oxyCODONE immediate-release (Roxicodone) tablet 5 mg  5 mg    Or    oxyCODONE immediate release (Roxicodone) tablet 10 mg  10 mg    lactated ringers infusion      guaiFENesin ER (Mucinex) tablet 600 mg  600 mg    HYDROmorphone (Dilaudid) injection 1 mg  1 mg       Exam:    /65   Pulse 89   Temp 36.7 °C (98 °F) (Oral)  "  Resp 20   Ht 1.626 m (5' 4\")   Wt (!) 154 kg (340 lb)   SpO2 96%     A, A, and O x 3 NAD  Feeling better    Incision: Clean/dry/intact.  Prevena wound VAC in place.  Scant output.  Laparoscopic incision sites are clean/dry/intact and covered in dermabodn.    No c/c/1+ edema bilaterally.    Abdomen: obese and FF u/1     Labs:  Recent Labs     12/28/24  2328 12/29/24  0305 12/30/24  0058   WBC 15.3* 15.2* 15.9*   RBC 3.08* 2.87* 2.83*   HEMOGLOBIN 8.2* 7.5* 7.4*   HEMATOCRIT 25.7* 24.1* 23.9*   MCV 83.4 84.0 84.5   MCH 26.6* 26.1* 26.1*   MCHC 31.9* 31.1* 31.0*   RDW 53.1* 54.3* 53.5*   PLATELETCT 424 392 422   MPV 9.0 9.2 9.6        Activity:   Discharge to home  Pelvic Rest x 6 weeks  Ambulate TID.  Gallbladder diet.  Prevena wound VAC to be removed later this week.        Assessment:  Stable and ready for discharge.  Influenza A - contact precautions.  Unable to visit baby secondary to active influenza A infection.       Follow up: Dr Collado in 1 day to have her Prevena removed and again in 1 week for incision check, 2 weeks for BP check and 6 weeks for postpartum appointment.     Discharge Meds:   Current Outpatient Medications   Medication Sig Dispense Refill    ibuprofen (MOTRIN) 600 MG Tab Take 1 Tablet by mouth every 6 hours as needed for Moderate Pain. 30 Tablet 1    oseltamivir (TAMIFLU) 75 MG Cap Take 1 Capsule by mouth 2 times a day. 10 Capsule 0    acetaminophen (TYLENOL) 500 MG Tab Take 2 Tablets by mouth every 6 hours as needed for Fever. 30 Tablet 0    benzonatate (TESSALON) 100 MG Cap Take 1 Capsule by mouth 3 times a day as needed for Cough. 60 Capsule 0    guaiFENesin ER (MUCINEX) 600 MG TABLET SR 12 HR Take 1 Tablet by mouth every 12 hours. 28 Tablet 1    [START ON 12/31/2024] NIFEdipine SR (PROCADIA-XL) 30 MG tablet Take 1 Tablet by mouth every day. 30 Tablet 1    oxyCODONE immediate release (ROXICODONE) 10 MG immediate release tablet Take 1 Tablet by mouth every four hours as needed for " Severe Pain for up to 7 days. 28 Tablet 0    simethicone (MYLICON) 125 MG chewable tablet Chew 1 Tablet 3 times a day as needed for Flatulence. 120 Tablet 1     Folitab    Kristin Buckley M.D.

## 2024-12-30 NOTE — CARE PLAN
The patient is Stable - Low risk of patient condition declining or worsening    Shift Goals  Clinical Goals: pulmonary hygiene, comfort, discharge  Patient Goals: rest, discharge  Family Goals: patient comfort    Progress made toward(s) clinical / shift goals:    Problem: Pain  Goal: Patient's pain will be alleviated or reduced to the patient’s comfort goal  Description: Target End Date:  Prior to discharge or change in level of care    1.  Document pain using the appropriate pain scale per order or unit policy  2.  Administer pain medications per provider order and/or assist with epidural/spinal placement as needed  3.  Pain management medications as ordered  4.  Educate and implement non-pharmacologic comfort measures (i.e. relaxation, distraction, massage, cold/heat therapy, etc.)  5.  Assess for nonverbal signs of ineffective coping with pain and offer pain medication and/or epidural anesthesia  Outcome: Progressing     Problem: Discharge Barriers/Planning  Goal: Patient's continuum of care needs are met  Description: Target End Date:  Prior to discharge or change in level of care    1.  Identify potential discharge barriers on admission and throughout hospitalization  2.  Collaborate with Case Management, , Clinical Educators, Navigators and others on the transitional care team to meet discharge needs  3.  Involve patient/family/caregivers in setting and prioritizing goals for hospitalization and discharge  4.  Ensure Flu vaccinations are addressed  5.  Inquire if patient is interested in the Meds to Bed program  6.  Ensure patient and family/caregiver are able to demonstrate use of equipment as prescribed  7.  Ensure patient and family/caregiver can verbalize understanding of patient education  8.  Explain discharge instructions and medication reconciliation to patient and family/caregiver

## 2024-12-30 NOTE — CARE PLAN
The patient is Stable - Low risk of patient condition declining or worsening    Shift Goals  Clinical Goals: pulmonary hygiene, ambulation  Patient Goals: shower, rest  Family Goals: patient comfort    Progress made toward(s) clinical / shift goals: Educated patient on importance of pulmonary hygiene. Patient verbalized understanding. Patient demonstrated correct use of the IS. Encouraged patient to use the IS ten times per hour. Patient demonstrated deep breathing and coughing. Patient ambulated up to bathroom throughout shift. Patient showered. Patient rested comfortably intermittently throughout shift.      Problem: Respiratory  Goal: Patient will achieve/maintain optimum respiratory ventilation and gas exchange  Outcome: Progressing     Problem: Mobility  Goal: Patient's capacity to carry out activities will improve  Outcome: Progressing       Patient is not progressing towards the following goals:

## 2024-12-30 NOTE — PROGRESS NOTES
"Bedside report received.  Assessment complete.  A&O x 4. Patient calls appropriately.  Patient ambulates with standby assist.   Patient has 2/10 pain. Patient declines interventions at this time.   Denies N&V. Tolerating regular diet.  Patient has lap sites x4 with dermabond in place. Sites are clean, dry, intact, and open to air.   Patient has a transverse incision with prevena wound vac in place.   + void, + flatus, - BM (last bowel movement 12/24).  Patient denies SOB. On room air.   SCD's off. Patient up in chair.     /76   Pulse 99   Temp 37.4 °C (99.3 °F) (Temporal)   Resp 18   Ht 1.626 m (5' 4\")   Wt (!) 154 kg (340 lb)   SpO2 92%   Breastfeeding No   BMI 58.36 kg/m²     Review plan with of care with patient. Call light and personal belongings within reach. Hourly rounding in place. All needs met at this time.    "

## 2024-12-30 NOTE — PROGRESS NOTES
Pt discharged from unit. All belongings with pt. PIV not present. Discharge paperwork reviewed with patient, all questions answered, and paperwork signed. One copy with patient, and one copy kept to be scanned into patient's chart.   M2b delivered and reviewed.

## 2024-12-31 LAB
BACTERIA UR CULT: NORMAL
SIGNIFICANT IND 70042: NORMAL
SITE SITE: NORMAL
SOURCE SOURCE: NORMAL

## 2025-01-03 LAB
BACTERIA BLD CULT: NORMAL
BACTERIA BLD CULT: NORMAL
SIGNIFICANT IND 70042: NORMAL
SIGNIFICANT IND 70042: NORMAL
SITE SITE: NORMAL
SITE SITE: NORMAL
SOURCE SOURCE: NORMAL
SOURCE SOURCE: NORMAL

## 2025-01-14 ENCOUNTER — OFFICE VISIT (OUTPATIENT)
Dept: SURGERY | Facility: MEDICAL CENTER | Age: 30
End: 2025-01-14
Attending: STUDENT IN AN ORGANIZED HEALTH CARE EDUCATION/TRAINING PROGRAM
Payer: COMMERCIAL

## 2025-01-14 VITALS
BODY MASS INDEX: 50.02 KG/M2 | DIASTOLIC BLOOD PRESSURE: 74 MMHG | HEIGHT: 64 IN | WEIGHT: 293 LBS | TEMPERATURE: 98.7 F | OXYGEN SATURATION: 100 % | SYSTOLIC BLOOD PRESSURE: 126 MMHG | HEART RATE: 106 BPM

## 2025-01-14 DIAGNOSIS — Z90.49 STATUS POST LAPAROSCOPIC CHOLECYSTECTOMY: ICD-10-CM

## 2025-01-14 PROCEDURE — 99024 POSTOP FOLLOW-UP VISIT: CPT | Performed by: PHYSICIAN ASSISTANT

## 2025-01-14 ASSESSMENT — FIBROSIS 4 INDEX: FIB4 SCORE: 0.29

## 2025-01-14 NOTE — PROGRESS NOTES
"    HISTORY OF PRESENT ILLNESS: The patient is a 29 year-old woman who is 4 weeks post  who returns to the [facility] for routine follow-up after undergoing a laparoscopic cholecystectomy for gallstone pancreatitis by Jose Deleon MD on 2024. Since surgery, her pain has been slowly improving. She is tolerating a diet, however has had a low appetite. Normal bowel function. She has no specific concerns today.     CURRENT MEDICATIONS:  Current Outpatient Medications   Medication Sig    ibuprofen (MOTRIN) 600 MG Tab Take 1 Tablet by mouth every 6 hours as needed for Moderate Pain.    oseltamivir (TAMIFLU) 75 MG Cap Take 1 Capsule by mouth 2 times a day for 7 doses    acetaminophen (TYLENOL) 500 MG Tab Take 2 Tablets by mouth every 6 hours as needed for Fever.    benzonatate (TESSALON) 100 MG Cap Take 1 Capsule by mouth 3 times a day as needed for Cough.    guaiFENesin ER (MUCINEX) 600 MG TABLET SR 12 HR Take 1 Tablet by mouth every 12 hours.    NIFEdipine SR (PROCADIA-XL) 30 MG tablet Take 1 Tablet by mouth every day.    simethicone (MYLICON) 125 MG chewable tablet Chew 1 Tablet 3 times a day as needed for Flatulence.    ferrous sulfate-c-folic acid (FOLITAB 500) 105-500-0.8 MG Tab CR Take 1 Tablet by mouth every day.    docusate sodium 100 MG Cap Take 100 mg by mouth 2 times a day as needed for Constipation.       PHYSICAL EXAMINATION:  Vital Signs: /74 (BP Location: Right arm, Patient Position: Sitting, BP Cuff Size: Adult)   Pulse (!) 106   Temp 37.1 °C (98.7 °F) (Temporal)   Ht 1.626 m (5' 4\")   Wt (!) 141 kg (310 lb)   SpO2 100%   Physical Exam  Vitals and nursing note reviewed.   Constitutional:       General: She is not in acute distress.     Appearance: She is obese.   Abdominal:      General: There is no distension.      Palpations: Abdomen is soft.      Tenderness: There is no abdominal tenderness.      Comments: Port sites are well healing and " approximated with Dermabond. No overt signs of infection.    Neurological:      Mental Status: She is alert.         LABORATORY VALUES:  Lab Results   Component Value Date/Time    WBC 15.9 (H) 12/30/2024 12:58 AM    RBC 2.83 (L) 12/30/2024 12:58 AM    HEMOGLOBIN 7.4 (L) 12/30/2024 12:58 AM    HEMATOCRIT 23.9 (L) 12/30/2024 12:58 AM    MCV 84.5 12/30/2024 12:58 AM    MCH 26.1 (L) 12/30/2024 12:58 AM    MCHC 31.0 (L) 12/30/2024 12:58 AM    MPV 9.6 12/30/2024 12:58 AM    NEUTSPOLYS 74.10 (H) 12/30/2024 12:58 AM    LYMPHOCYTES 15.50 (L) 12/30/2024 12:58 AM    MONOCYTES 8.20 12/30/2024 12:58 AM    EOSINOPHILS 0.20 12/30/2024 12:58 AM    BASOPHILS 0.20 12/30/2024 12:58 AM    ANISOCYTOSIS 1+ 12/26/2024 01:05 PM     Lab Results   Component Value Date/Time    SODIUM 136 12/30/2024 12:58 AM    POTASSIUM 3.4 (L) 12/30/2024 12:58 AM    CHLORIDE 103 12/30/2024 12:58 AM    CO2 22 12/30/2024 12:58 AM    GLUCOSE 94 12/30/2024 12:58 AM    BUN 8 12/30/2024 12:58 AM    CREATININE 0.63 12/30/2024 12:58 AM     Lab Results   Component Value Date/Time    PROTHROMBTM 14.4 12/25/2024 01:06 AM    INR 1.11 12/25/2024 01:06 AM       IMAGING:  No orders to display       PATHOLOGY: Final pathology demonstrated gallbladder: Chronic cholecystitis with cholesterolosis and cholelithiasis.    ASSESSMENT AND PLAN:  Satisfactory progress following a laparoscopic cholecystectomy.  Final postoperative instructions provided. Lifting restrictions reviewed. Discharge from the [facility].       ____________________________________     Emily Mensah P.A.-C.    DD: 1/14/2025  2:17 PM

## 2025-02-01 ENCOUNTER — HOSPITAL ENCOUNTER (OUTPATIENT)
Dept: LAB | Facility: MEDICAL CENTER | Age: 30
End: 2025-02-01
Attending: STUDENT IN AN ORGANIZED HEALTH CARE EDUCATION/TRAINING PROGRAM
Payer: COMMERCIAL

## 2025-02-01 LAB
25(OH)D3 SERPL-MCNC: 31 NG/ML (ref 30–100)
ALBUMIN SERPL BCP-MCNC: 3.7 G/DL (ref 3.2–4.9)
AMORPHOUS CRYSTALS 1764: PRESENT /HPF
APPEARANCE UR: ABNORMAL
BACTERIA #/AREA URNS HPF: ABNORMAL /HPF
BASOPHILS # BLD AUTO: 0.5 % (ref 0–1.8)
BASOPHILS # BLD: 0.05 K/UL (ref 0–0.12)
BILIRUB UR QL STRIP.AUTO: NEGATIVE
BUN SERPL-MCNC: 12 MG/DL (ref 8–22)
CALCIUM ALBUM COR SERPL-MCNC: 9.6 MG/DL (ref 8.5–10.5)
CALCIUM SERPL-MCNC: 9.4 MG/DL (ref 8.5–10.5)
CASTS URNS QL MICRO: ABNORMAL /LPF (ref 0–2)
CHLORIDE SERPL-SCNC: 107 MMOL/L (ref 96–112)
CO2 SERPL-SCNC: 20 MMOL/L (ref 20–33)
COLOR UR: YELLOW
CREAT SERPL-MCNC: 0.68 MG/DL (ref 0.5–1.4)
CREAT UR-MCNC: 221.16 MG/DL
CREAT UR-MCNC: 226.45 MG/DL
EOSINOPHIL # BLD AUTO: 0.04 K/UL (ref 0–0.51)
EOSINOPHIL NFR BLD: 0.4 % (ref 0–6.9)
EPITHELIAL CELLS 1715: >20 /HPF (ref 0–5)
ERYTHROCYTE [DISTWIDTH] IN BLOOD BY AUTOMATED COUNT: 53.7 FL (ref 35.9–50)
FERRITIN SERPL-MCNC: 71.4 NG/ML (ref 10–291)
GFR SERPLBLD CREATININE-BSD FMLA CKD-EPI: 119 ML/MIN/1.73 M 2
GLUCOSE SERPL-MCNC: 87 MG/DL (ref 65–99)
GLUCOSE UR STRIP.AUTO-MCNC: NEGATIVE MG/DL
HCT VFR BLD AUTO: 36.3 % (ref 37–47)
HGB BLD-MCNC: 11 G/DL (ref 12–16)
IMM GRANULOCYTES # BLD AUTO: 0.07 K/UL (ref 0–0.11)
IMM GRANULOCYTES NFR BLD AUTO: 0.7 % (ref 0–0.9)
IRON SATN MFR SERPL: 16 % (ref 15–55)
IRON SERPL-MCNC: 32 UG/DL (ref 40–170)
KETONES UR STRIP.AUTO-MCNC: ABNORMAL MG/DL
LEUKOCYTE ESTERASE UR QL STRIP.AUTO: ABNORMAL
LYMPHOCYTES # BLD AUTO: 2.36 K/UL (ref 1–4.8)
LYMPHOCYTES NFR BLD: 24.2 % (ref 22–41)
MAGNESIUM SERPL-MCNC: 1.8 MG/DL (ref 1.5–2.5)
MCH RBC QN AUTO: 26.1 PG (ref 27–33)
MCHC RBC AUTO-ENTMCNC: 30.3 G/DL (ref 32.2–35.5)
MCV RBC AUTO: 86 FL (ref 81.4–97.8)
MICRO URNS: ABNORMAL
MICROALBUMIN UR-MCNC: 5.8 MG/DL
MICROALBUMIN/CREAT UR: 26 MG/G (ref 0–30)
MONOCYTES # BLD AUTO: 0.47 K/UL (ref 0–0.85)
MONOCYTES NFR BLD AUTO: 4.8 % (ref 0–13.4)
NEUTROPHILS # BLD AUTO: 6.76 K/UL (ref 1.82–7.42)
NEUTROPHILS NFR BLD: 69.4 % (ref 44–72)
NITRITE UR QL STRIP.AUTO: NEGATIVE
NRBC # BLD AUTO: 0 K/UL
NRBC BLD-RTO: 0 /100 WBC (ref 0–0.2)
PH UR STRIP.AUTO: 5.5 [PH] (ref 5–8)
PHOSPHATE SERPL-MCNC: 3.6 MG/DL (ref 2.5–4.5)
PLATELET # BLD AUTO: 409 K/UL (ref 164–446)
PMV BLD AUTO: 10.8 FL (ref 9–12.9)
POTASSIUM SERPL-SCNC: 3.5 MMOL/L (ref 3.6–5.5)
PROT UR QL STRIP: 30 MG/DL
PROT UR-MCNC: 28 MG/DL (ref 0–15)
PROT/CREAT UR: 124 MG/G (ref 10–107)
PTH-INTACT SERPL-MCNC: 19.4 PG/ML (ref 14–72)
RBC # BLD AUTO: 4.22 M/UL (ref 4.2–5.4)
RBC # URNS HPF: ABNORMAL /HPF (ref 0–2)
RBC UR QL AUTO: ABNORMAL
SODIUM SERPL-SCNC: 140 MMOL/L (ref 135–145)
SP GR UR STRIP.AUTO: 1.03
TIBC SERPL-MCNC: 200 UG/DL (ref 250–450)
UIBC SERPL-MCNC: 168 UG/DL (ref 110–370)
UROBILINOGEN UR STRIP.AUTO-MCNC: 1 EU/DL
WBC # BLD AUTO: 9.8 K/UL (ref 4.8–10.8)
WBC #/AREA URNS HPF: ABNORMAL /HPF

## 2025-02-01 PROCEDURE — 82570 ASSAY OF URINE CREATININE: CPT

## 2025-02-01 PROCEDURE — 81001 URINALYSIS AUTO W/SCOPE: CPT

## 2025-02-01 PROCEDURE — 36415 COLL VENOUS BLD VENIPUNCTURE: CPT

## 2025-02-01 PROCEDURE — 83550 IRON BINDING TEST: CPT

## 2025-02-01 PROCEDURE — 83970 ASSAY OF PARATHORMONE: CPT

## 2025-02-01 PROCEDURE — 83540 ASSAY OF IRON: CPT

## 2025-02-01 PROCEDURE — 85025 COMPLETE CBC W/AUTO DIFF WBC: CPT

## 2025-02-01 PROCEDURE — 82728 ASSAY OF FERRITIN: CPT

## 2025-02-01 PROCEDURE — 80069 RENAL FUNCTION PANEL: CPT

## 2025-02-01 PROCEDURE — 82306 VITAMIN D 25 HYDROXY: CPT

## 2025-02-01 PROCEDURE — 84156 ASSAY OF PROTEIN URINE: CPT

## 2025-02-01 PROCEDURE — 82043 UR ALBUMIN QUANTITATIVE: CPT

## 2025-02-01 PROCEDURE — 83735 ASSAY OF MAGNESIUM: CPT

## 2025-04-02 ENCOUNTER — HOSPITAL ENCOUNTER (OUTPATIENT)
Dept: LAB | Facility: MEDICAL CENTER | Age: 30
End: 2025-04-02
Attending: INTERNAL MEDICINE
Payer: COMMERCIAL

## 2025-04-02 LAB
ALBUMIN SERPL BCP-MCNC: 4.1 G/DL (ref 3.2–4.9)
BUN SERPL-MCNC: 11 MG/DL (ref 8–22)
CALCIUM ALBUM COR SERPL-MCNC: 9.1 MG/DL (ref 8.5–10.5)
CALCIUM SERPL-MCNC: 9.2 MG/DL (ref 8.5–10.5)
CHLORIDE SERPL-SCNC: 101 MMOL/L (ref 96–112)
CO2 SERPL-SCNC: 22 MMOL/L (ref 20–33)
CREAT SERPL-MCNC: 0.62 MG/DL (ref 0.5–1.4)
GFR SERPLBLD CREATININE-BSD FMLA CKD-EPI: 121 ML/MIN/1.73 M 2
GLUCOSE SERPL-MCNC: 87 MG/DL (ref 65–99)
PHOSPHATE SERPL-MCNC: 4.1 MG/DL (ref 2.5–4.5)
POTASSIUM SERPL-SCNC: 3.7 MMOL/L (ref 3.6–5.5)
SODIUM SERPL-SCNC: 135 MMOL/L (ref 135–145)

## 2025-04-02 PROCEDURE — 36415 COLL VENOUS BLD VENIPUNCTURE: CPT

## 2025-04-02 PROCEDURE — 80069 RENAL FUNCTION PANEL: CPT

## (undated) DEVICE — SUTURE 4-0 MONOCRYL PLUS PS-2 - 27 INCH (36/BX)

## (undated) DEVICE — CHLORAPREP 26 ML APPLICATOR - ORANGE TINT(25/CA)

## (undated) DEVICE — SLEEVE, SEQUENTIAL CALF REG

## (undated) DEVICE — TUBING CLEARLINK DUO-VENT - C-FLO (48EA/CA)

## (undated) DEVICE — SET SUCTION/IRRIGATION WITH DISPOSABLE TIP (6/CA )PART #0250-070-520 IS A SUB

## (undated) DEVICE — SET EXTENSION WITH 2 PORTS (48EA/CA) ***PART #2C8610 IS A SUBSTITUTE*****

## (undated) DEVICE — CLOSURE SKIN STRIP 1/2 X 4 IN - (STERI STRIP) (50/BX 4BX/CA)

## (undated) DEVICE — SUTURE 0 VICRYL PLUS UR-6 - 27 INCH (36/BX)

## (undated) DEVICE — TROCAR Z THREAD12MM OPTICAL - NON BLADED (6/BX)

## (undated) DEVICE — SODIUM CHL IRRIGATION 0.9% 1000ML (12EA/CA)

## (undated) DEVICE — GOWN WARMING STANDARD FLEX - (30/CA)

## (undated) DEVICE — ELECTRODE DUAL RETURN W/ CORD - (50/PK)

## (undated) DEVICE — KIT  I.V. START (100EA/CA)

## (undated) DEVICE — TUBE SUCTION YANKAUER  1/4 X 6FT (20EA/CA)"

## (undated) DEVICE — SUTURE 4-0 VICRYL PLUS FS-1 - 27 INCH (36/BX)

## (undated) DEVICE — COVER LIGHT HANDLE ALC PLUS DISP (18EA/BX)

## (undated) DEVICE — TROCAR 5X100 NON BLADED Z-TH - READ KII (6/BX)

## (undated) DEVICE — DERMABOND ADVANCED - (12EA/BX)

## (undated) DEVICE — SCISSORS 5MM CVD (6EA/BX)

## (undated) DEVICE — GLOVE BIOGEL SZ 8.5 SURGICAL PF LTX - (50PR/BX 4BX/CA)

## (undated) DEVICE — HEAD HOLDER JUNIOR/ADULT

## (undated) DEVICE — NEEDLE INSFL 120MM 14GA VRRS - (20/BX)

## (undated) DEVICE — CANNULA W/SEAL 5X100 Z-THRE - ADED KII (12/BX)

## (undated) DEVICE — GLOVE BIOGEL PI INDICATOR SZ 6.5 SURGICAL PF LF - (50/BX 4BX/CA)

## (undated) DEVICE — RETRACTOR O C SECTION LRY - (5/BX)

## (undated) DEVICE — CANISTER SUCTION 3000ML MECHANICAL FILTER AUTO SHUTOFF MEDI-VAC NONSTERILE LF DISP (40EA/CA)

## (undated) DEVICE — SPONGE XRAY 8X4 STERL. 12PL - (10EA/TY 80TY/CA)

## (undated) DEVICE — GLOVE SZ 6.5 BIOGEL PI MICRO - PF LF (50PR/BX)

## (undated) DEVICE — PACK C-SECTION (2EA/CA)

## (undated) DEVICE — CANISTER SUCTION 3000ML MECHANICAL FILTER AUTO SHUTOFF MEDI-VAC NONSTERILE LF DISP  (40EA/CA)

## (undated) DEVICE — SUTURE 0 36IN PDS + VIO CT-1 (36PK/BX)

## (undated) DEVICE — STAPLER SKIN DISP - (6/BX 10BX/CA) VISISTAT

## (undated) DEVICE — CLIP MED LG INTNL HRZN TI ESCP - (20/BX)

## (undated) DEVICE — SUTURE 1 CHROMIC CTX ETHICON - (36PK/BX)

## (undated) DEVICE — WATER IRRIGATION STERILE 1000ML (12EA/CA)

## (undated) DEVICE — TAPE CLOTH MEDIPORE 6 INCH - (12RL/CA)

## (undated) DEVICE — GLOVE SZ 7.5 BIOGEL PI MICRO - PF LF (50PR/BX)

## (undated) DEVICE — SUTURE 1 VICRYL PLUS CTX - 36 INCH (36/BX)

## (undated) DEVICE — GLOVE BIOGEL PI INDICATOR SZ 7.5 SURGICAL PF LF -(50/BX 4BX/CA)

## (undated) DEVICE — BAG RETRIEVAL 10ML (10EA/BX)

## (undated) DEVICE — GOWN SURGEONS X-LARGE - DISP. (30/CA)

## (undated) DEVICE — CATHETER IV NON-SAFETY 18 GA X 1 1/4 (50/BX 4BX/CA)

## (undated) DEVICE — SENSOR OXIMETER ADULT SPO2 RD SET (20EA/BX)

## (undated) DEVICE — CLIP HEMOLOCK PURPLE - (14/BX)

## (undated) DEVICE — SUTURE 4-0 MONOCRYL PLUS PS-1 - 27 INCH (36/BX)

## (undated) DEVICE — SET LEADWIRE 5 LEAD BEDSIDE DISPOSABLE ECG (1SET OF 5/EA)

## (undated) DEVICE — LACTATED RINGERS INJ 1000 ML - (14EA/CA 60CA/PF)

## (undated) DEVICE — HEMOSTAT ABSORBABLE POWDER SURGICEL 3G (5EA/BX)

## (undated) DEVICE — GLOVE BIOGEL SZ 6 PF LATEX - (50EA/BX 4BX/CA)

## (undated) DEVICE — SUTURE 3-0 VICRYL PLUS CT-1 - 36 INCH (36/BX)

## (undated) DEVICE — PACK LAP CHOLE OR - (2EA/CA)

## (undated) DEVICE — SUTURE 0 VICRYL PLUS CT-1 - 36 INCH (36/BX)

## (undated) DEVICE — GLOVE BIOGEL SZ 7.5 SURGICAL PF LTX - (50PR/BX 4BX/CA)

## (undated) DEVICE — TRAY SPINAL ANESTHESIA NON-SAFETY (10/CA)

## (undated) DEVICE — GLOVE BIOGEL PI ORTHO SZ 6 SURGICAL PF LF (40PR/BX)

## (undated) DEVICE — SUTURE 2-0 VICRYL PLUS CT-1 36 (36PK/BX)"

## (undated) DEVICE — SUTURE GENERAL